# Patient Record
Sex: FEMALE | Race: WHITE | Employment: OTHER | URBAN - METROPOLITAN AREA
[De-identification: names, ages, dates, MRNs, and addresses within clinical notes are randomized per-mention and may not be internally consistent; named-entity substitution may affect disease eponyms.]

---

## 2017-01-23 ENCOUNTER — OFFICE VISIT (OUTPATIENT)
Dept: FAMILY MEDICINE CLINIC | Age: 67
End: 2017-01-23

## 2017-01-23 VITALS
HEART RATE: 95 BPM | HEIGHT: 66 IN | DIASTOLIC BLOOD PRESSURE: 64 MMHG | TEMPERATURE: 98.2 F | WEIGHT: 169.8 LBS | SYSTOLIC BLOOD PRESSURE: 146 MMHG | OXYGEN SATURATION: 95 % | RESPIRATION RATE: 12 BRPM | BODY MASS INDEX: 27.29 KG/M2

## 2017-01-23 DIAGNOSIS — R73.02 IGT (IMPAIRED GLUCOSE TOLERANCE): ICD-10-CM

## 2017-01-23 DIAGNOSIS — E55.9 VITAMIN D DEFICIENCY: ICD-10-CM

## 2017-01-23 DIAGNOSIS — Z72.0 TOBACCO ABUSE: ICD-10-CM

## 2017-01-23 DIAGNOSIS — F41.9 ANXIETY: ICD-10-CM

## 2017-01-23 DIAGNOSIS — I73.9 PVD (PERIPHERAL VASCULAR DISEASE) (HCC): ICD-10-CM

## 2017-01-23 DIAGNOSIS — J44.9 CHRONIC OBSTRUCTIVE PULMONARY DISEASE, UNSPECIFIED COPD TYPE (HCC): Primary | ICD-10-CM

## 2017-01-23 DIAGNOSIS — E78.5 HYPERLIPIDEMIA, UNSPECIFIED HYPERLIPIDEMIA TYPE: ICD-10-CM

## 2017-01-23 RX ORDER — AZITHROMYCIN 250 MG/1
TABLET, FILM COATED ORAL
Qty: 6 TAB | Refills: 0 | Status: SHIPPED | OUTPATIENT
Start: 2017-01-23 | End: 2017-01-28

## 2017-01-23 RX ORDER — ALPRAZOLAM 0.5 MG/1
TABLET ORAL
Qty: 90 TAB | Refills: 5 | Status: SHIPPED | OUTPATIENT
Start: 2017-01-23 | End: 2017-07-04 | Stop reason: SDUPTHER

## 2017-01-23 NOTE — MR AVS SNAPSHOT
Visit Information Date & Time Provider Department Dept. Phone Encounter #  
 1/23/2017 10:30 AM Nena Meier  [facility] [phone] [id number] Follow-up Instructions Return in about 6 months (around 7/23/2017) for cholesterol. Your Appointments 1/23/2017 10:30 AM  
ROUTINE CARE with Nena Meier  [facility] ([address]) Appt Note: routine 6 months f/u; LM to r/s - jdp - 12/29/16; LM to r/s - jdp - 12/29/16  
 [address] [phone]  
[phone]  
  
   
 [address] [address] Upcoming Health Maintenance Date Due Pneumococcal 19-64 Medium Risk (1 of 1 - PPSV23) 2/3/1971 DTaP/Tdap/Td series (1 - Tdap) 2/3/1973 FOBT Q 1 YEAR AGE 50-75 6/30/2015 INFLUENZA AGE 9 TO ADULT 8/1/2016 PAP AKA CERVICAL CYTOLOGY 2/26/2017 BREAST CANCER SCRN MAMMOGRAM 3/31/2018 Allergies as of 1/23/2017  Review Complete On: 1/23/2017 By: Nena Meier NP Severity Noted Reaction Type Reactions Penicillins High 09/29/2010    Rash, Itching, Swelling Of throat Bactrim [Sulfamethoprim Ds]  09/29/2010    Rash Biaxin [Clarithromycin]  09/29/2010    Rash Ciprofloxacin  02/27/2014    Rash Ivp Dye [Fd And C Blue No.1]  09/29/2010    Rash Keflex [Cephalexin]  09/29/2010    Rash  
 Nsaids (Non-steroidal Anti-inflammatory Drug)  09/29/2010    Rash Simvastatin  10/30/2014    Myalgia Tetracycline  09/29/2010    Rash Current Immunizations  Reviewed on 9/29/2014 Name Date Influenza Vaccine 10/15/2015, 9/27/2014, 11/20/2013 Influenza Vaccine Split 11/9/2011, 10/26/2010 Not reviewed this visit You Were Diagnosed With   
  
 Codes Comments Chronic obstructive pulmonary disease, unspecified [facility] ([facility]    -  Primary ICD-10-CM: J44.9 ICD-9-CM: 224  IGT (impaired glucose tolerance)     ICD-10-CM: R73.02 
 ICD-9-CM: 790.22 PVD (peripheral vascular disease) ([facility]     ICD-10-CM: I73.9 ICD-9-CM: 443.9 Anxiety     ICD-10-CM: F41.9 ICD-9-CM: 300.00 Hyperlipidemia, unspecified hyperlipidemia type     ICD-10-CM: E78.5 ICD-9-CM: 272.4 Tobacco abuse     ICD-10-CM: Z72.0 ICD-9-CM: 305.1 Vitamin D deficiency     ICD-10-CM: E55.9 ICD-9-CM: 268.9 Vitals BP Pulse Temp Resp Height(growth percentile) Weight(growth percentile) 146/64 (BP 1 Location: Right arm, BP Patient Position: Sitting) 95 98.2 °F (36.8 °C) (Oral) 12 5' 6\" (1.676 m) 169 lb 12.8 oz (77 kg) SpO2 BMI OB Status Smoking Status 95% 27.41 kg/m2 Postmenopausal Current Every Day Smoker Vitals History BMI and BSA Data Body Mass Index Body Surface Area  
 27.41 kg/m 2 1.89 m 2 Preferred Pharmacy Pharmacy Name Phone [address] [facility] AT [facility] OF  [address] & [address] [facility] [phone] Your Updated Medication List  
  
   
This list is accurate as of: 1/23/17 10:27 AM.  Always use your most recent med list.  
  
  
  
  
 ACCU-CHEK COMPACT PLUS TEST Strp Generic drug:  Blood Sugar Diagnostic, Drum USE TO TEST TWICE DAILY ALPRAZolam 0.5 mg tablet Commonly known as:  XANAX  
TAKE 1 TABLET BY MOUTH THREE TIMES DAILY AS NEEDED FOR ANXIETY  
  
 azithromycin 250 mg tablet Commonly known as:  [place] Jennifer Take 2 tablets today, then take 1 tablet daily Blood-Glucose Meter monitoring kit For twice a day testing  
  
 cyclobenzaprine 10 mg tablet Commonly known as:  FLEXERIL  
  
 fentaNYL 25 mcg/hr PATCH Commonly known as:  DURAGESIC  
1 Patch by TransDERmal route every seventy-two (72) hours. gabapentin 600 mg tablet Commonly known as:  NEURONTIN Take  by mouth three (3) times daily. Lancets Misc Commonly known as:  ACCU-CHEK SOFTCLIX LANCETS Use as dir  
  
 levalbuterol tartrate 45 mcg/actuation inhaler Commonly known as:  Aure Ras Take 1-2 Puffs by inhalation every four (4) hours as needed for Wheezing. PERCOCET  mg per tablet Generic drug:  oxyCODONE-acetaminophen Take 1 Tab by mouth three (3) times daily. pravastatin 20 mg tablet Commonly known as:  PRAVACHOL  
  
 raNITIdine 150 mg tablet Commonly known as:  ZANTAC TAKE 1 TABLET BY MOUTH TWICE A DAY FOR ACID REFLUX  
  
 tiZANidine 2 mg tablet Commonly known as:  [place]ose[place] ZyrTEC 10 mg tablet Generic drug:  cetirizine Take  by mouth daily. Prescriptions Printed Refills ALPRAZolam (XANAX) 0.5 mg tablet 5 Sig: TAKE 1 TABLET BY MOUTH THREE TIMES DAILY AS NEEDED FOR ANXIETY Class: Print Prescriptions Sent to Pharmacy Refills  
 azithromycin (ZITHROMAX) 250 mg tablet 0 Sig: Take 2 tablets today, then take 1 tablet daily Class: Normal  
 Pharmacy: [facility] [address]  Ph #: [phone] We Performed the Following CBC W/O DIFF [29002 CPT(R)] HEMOGLOBIN A1C WITH EAG [52851 CPT(R)] LIPID PANEL [06749 CPT(R)] METABOLIC PANEL, COMPREHENSIVE [14748 CPT(R)] VITAMIN D, 25 HYDROXY [id number] CPT(R)] Follow-up Instructions Return in about 6 months (around 7/23/2017) for cholesterol. Patient Instructions Learning About COPD What is COPD? COPD is a lung disease that makes it hard to breathe. COPD stands for chronic obstructive pulmonary disease. It is caused by damage to the lungs over many years, usually from smoking. COPD is a mix of two diseases: chronic bronchitis and emphysema. Other things that may put you at risk for COPD include breathing chemical fumes, dust, or air pollution over a long period of time. Secondhand smoke is also bad.  
In chronic bronchitis, the airways that carry air to the lungs (bronchial tubes) get inflamed and make a lot of mucus. This can narrow or block the airways, making it hard for you to breathe. In emphysema, the air sacs in your lungs are damaged and lose their stretch. Less air gets in and out of your lungs, which makes you feel short of breath. What can you expect when you have COPD? COPD gets worse over time. You cannot undo the damage to your lungs. Over time, you may find that: 
· You get short of breath even when you do simple things like get dressed or fix a meal. 
· It is hard to eat or exercise. · You lose weight and feel weaker. But there are things you can do to prevent more damage and feel better. What are the symptoms? The main symptoms are: · A cough that will not go away. · Mucus that comes up when you cough. · Shortness of breath that gets worse with activity. At times, your symptoms may suddenly flare up and get much worse. This is a called a COPD exacerbation (say \"egg-[facility]\"). When this happens, your usual symptoms quickly get worse and stay bad. This can be dangerous. You may have to go to the hospital. 
How can you keep COPD from getting worse? Don't smoke. That is the best way to keep COPD from getting worse. If you already smoke, it is never too late to stop. If you need help quitting, talk to your doctor about stop-smoking programs and medicines. These can increase your chances of quitting for good. You can do other things to keep COPD from getting worse: · Avoid bad air. Air pollution, chemical fumes, and dust also can make COPD worse. · Get a flu shot every year. A shot may keep the flu from turning into something more serious, like pneumonia. A flu shot also may lower your chances of having a COPD flare-up. · Get a pneumococcal shot. Most people need only one shot to prevent pneumonia, but doctors sometimes recommend a second shot for some people who got their first shot before they turned 72.  Talk with your doctor about whether you need a second shot. How is COPD treated? COPD is treated with medicines and oxygen. You also can take steps at home to stay healthy and keep your COPD from getting worse. Medicines and oxygen therapy · You may be taking medicines such as: ¨ Bronchodilators. These help open your airways and make breathing easier. Bronchodilators are either short-acting (work for 6 to 9 hours) or long-acting (work for 24 hours). You inhale most bronchodilators, so they start to act quickly. Always carry your quick-relief inhaler with you in case you need it while you are away from home. ¨ Corticosteroids. These reduce airway inflammation. They come in pill or inhaled form. You must take these medicines every day for them to work well. · Take your medicines exactly as prescribed. Call your doctor if you think you are having a problem with your medicine. · Oxygen therapy boosts the amount of oxygen in your blood and helps you breathe easier. Use the flow rate your doctor has recommended, and do not change it without talking to your doctor first. 
Other care at home · If your doctor recommends it, get more exercise. Walking is a good choice. Bit by bit, increase the amount you walk every day. Try for at least 30 minutes on most days of the week. · Learn breathing methodssuch as breathing through pursed lipsto help you become less short of breath. · If your doctor has not set you up with a pulmonary rehabilitation program, talk to him or her about whether rehab is right for you. Rehab includes exercise programs, education about your disease and how to manage it, help with diet and other changes, and emotional support. · Eat regular, healthy meals. Use bronchodilators about 1 hour before you eat to make it easier to eat. Eat several small meals instead of three large ones. Drink beverages at the end of the meal. Avoid foods that are hard to chew. Follow-up care is a key part of your treatment and safety. Be sure to make and go to all appointments, and call your doctor if you are having problems. It's also a good idea to know your test results and keep a list of the medicines you take. Where can you learn more? Go to http://dean-berny.info/. Enter V314 in the search box to learn more about \"Learning About COPD. \" 
Current as of: May 23, 2016 Content Version: 11.1 © 3578-8331 Healthwise, Incorporated. Care instructions adapted under license by [employer] (which disclaims liability or warranty for this information). If you have questions about a medical condition or this instruction, always ask your healthcare professional. [address] any warranty or liability for your use of this information. Introducing [facility]! [employer] introduces THINK360 patient portal. Now you can access parts of your medical record, email your doctor's office, and request medication refills online. 1. In your internet browser, go to [email or web address] 2. Click on the First Time User? Click Here link in the Sign In box. You will see the New Member Sign Up page. 3. Enter your THINK360 Access Code exactly as it appears below. You will not need to use this code after youve completed the sign-up process. If you do not sign up before the expiration date, you must request a new code. · THINK360 Access Code: [email or web address] Expires: 4/23/2017 10:27 AM 
 
4. Enter the last four digits of your Social Security Number (xxxx) and Date of Birth (mm/dd/yyyy) as indicated and click Submit. You will be taken to the next sign-up page. 5. Create a THINK360 ID. This will be your THINK360 login ID and cannot be changed, so think of one that is secure and easy to remember. 6. Create a BigStringt password. You can change your password at any time. 7. Enter your Password Reset Question and Answer. This can be used at a later time if you forget your password. 8. Enter your e-mail address. You will receive e-mail notification when new information is available in [address] 9. Click Sign Up. You can now view and download portions of your medical record. 10. Click the Download Summary menu link to download a portable copy of your medical information. If you have questions, please visit the Frequently Asked Questions section of the Where website. Remember, Where is NOT to be used for urgent needs. For medical emergencies, dial 911. Now available from your iPhone and Android! Please provide this summary of care documentation to your next provider. Your primary care clinician is listed as Ruby Burgess. If you have any questions after today's visit, please call [phone].

## 2017-01-23 NOTE — PATIENT INSTRUCTIONS
Learning About COPD  What is COPD? COPD is a lung disease that makes it hard to breathe. COPD stands for chronic obstructive pulmonary disease. It is caused by damage to the lungs over many years, usually from smoking. COPD is a mix of two diseases: chronic bronchitis and emphysema. Other things that may put you at risk for COPD include breathing chemical fumes, dust, or air pollution over a long period of time. Secondhand smoke is also bad. In chronic bronchitis, the airways that carry air to the lungs (bronchial tubes) get inflamed and make a lot of mucus. This can narrow or block the airways, making it hard for you to breathe. In emphysema, the air sacs in your lungs are damaged and lose their stretch. Less air gets in and out of your lungs, which makes you feel short of breath. What can you expect when you have COPD? COPD gets worse over time. You cannot undo the damage to your lungs. Over time, you may find that:  · You get short of breath even when you do simple things like get dressed or fix a meal.  · It is hard to eat or exercise. · You lose weight and feel weaker. But there are things you can do to prevent more damage and feel better. What are the symptoms? The main symptoms are:  · A cough that will not go away. · Mucus that comes up when you cough. · Shortness of breath that gets worse with activity. At times, your symptoms may suddenly flare up and get much worse. This is a called a COPD exacerbation (say \"egg-TAIWO--BAY-[facility]\"). When this happens, your usual symptoms quickly get worse and stay bad. This can be dangerous. You may have to go to the hospital.  How can you keep COPD from getting worse? Don't smoke. That is the best way to keep COPD from getting worse. If you already smoke, it is never too late to stop. If you need help quitting, talk to your doctor about stop-smoking programs and medicines. These can increase your chances of quitting for good.   You can do other things to keep COPD from getting worse:  · Avoid bad air. Air pollution, chemical fumes, and dust also can make COPD worse. · Get a flu shot every year. A shot may keep the flu from turning into something more serious, like pneumonia. A flu shot also may lower your chances of having a COPD flare-up. · Get a pneumococcal shot. Most people need only one shot to prevent pneumonia, but doctors sometimes recommend a second shot for some people who got their first shot before they turned 72. Talk with your doctor about whether you need a second shot. How is COPD treated? COPD is treated with medicines and oxygen. You also can take steps at home to stay healthy and keep your COPD from getting worse. Medicines and oxygen therapy  · You may be taking medicines such as:  ¨ Bronchodilators. These help open your airways and make breathing easier. Bronchodilators are either short-acting (work for 6 to 9 hours) or long-acting (work for 24 hours). You inhale most bronchodilators, so they start to act quickly. Always carry your quick-relief inhaler with you in case you need it while you are away from home. ¨ Corticosteroids. These reduce airway inflammation. They come in pill or inhaled form. You must take these medicines every day for them to work well. · Take your medicines exactly as prescribed. Call your doctor if you think you are having a problem with your medicine. · Oxygen therapy boosts the amount of oxygen in your blood and helps you breathe easier. Use the flow rate your doctor has recommended, and do not change it without talking to your doctor first.  Other care at home  · If your doctor recommends it, get more exercise. Walking is a good choice. Bit by bit, increase the amount you walk every day. Try for at least 30 minutes on most days of the week. · Learn breathing methods--such as breathing through pursed lips--to help you become less short of breath.   · If your doctor has not set you up with a pulmonary rehabilitation program, talk to him or her about whether rehab is right for you. Rehab includes exercise programs, education about your disease and how to manage it, help with diet and other changes, and emotional support. · Eat regular, healthy meals. Use bronchodilators about 1 hour before you eat to make it easier to eat. Eat several small meals instead of three large ones. Drink beverages at the end of the meal. Avoid foods that are hard to chew. Follow-up care is a key part of your treatment and safety. Be sure to make and go to all appointments, and call your doctor if you are having problems. It's also a good idea to know your test results and keep a list of the medicines you take. Where can you learn more? Go to http://dean-berny.info/. Enter V314 in the search box to learn more about \"Learning About COPD. \"  Current as of: May 23, 2016  Content Version: 11.1  © 8972-1717 [employer]. Care instructions adapted under license by [employer] (which disclaims liability or warranty for this information). If you have questions about a medical condition or this instruction, always ask your healthcare professional. [place] [address] any warranty or liability for your use of this information.

## 2017-01-23 NOTE — PROGRESS NOTES
HISTORY OF PRESENT ILLNESS  Augusto Rdz is a 61 y.o. female. HPI  Cardiovascular Review:  The patient has hypertension, hyperlipidemia, CAD s/p stenting and peripheral vascular disease. Seen by cardiology, Dr Elinor Martin. Diet and Lifestyle: generally follows a low fat low cholesterol diet, generally follows a low sodium diet, smoker 1 ppd  Home BP Monitoring: is not measured at home. Pertinent ROS: taking medications as instructed, no medication side effects noted, no TIA's, no chest pain on exertion, no dyspnea on exertion, no swelling of ankles. Patient refuses to take cholesterol medication as it causes myalgias. COPD Review:  The patient is being seen for follow up of COPD and history of 45 pack years tobacco use. Oxygen: She currently is not on home oxygen therapy. Symptoms: chronic dyspnea: severity = not present: course of sx: stable. Reports productive cough x 3 weeks. Already treated with Z-pack. Patient uses 1 pillows at night. Patient does smoke cigarettes. Peripheral Vascular Disease  Patient in with known h/o PVD. Patient denies symptoms at present. 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 The patient's risks factors for atherosclerosis include smoking, hypertension, peripheral vascular disease, hypercholesterolemia. She is seen by Dr Shiva Esposito annually. Osteoarthritis and Chronic Pain:  Patient has osteoarthritis and spondylosis, primarily affecting the back. Symptoms onset: problem is longstanding. Rheumatological ROS: using narcotic analgesics regularly daily in stable pattern with good pain control and good quality of life. Pain managed by Dr. Jennifer Laguerre - pain management. Patient had lumbar laminectomy in 8/2015 with Dr. Pan Agrawal without improvement. Patient now being evaluated by Dr. Kate Stratton.         Current Outpatient Prescriptions   Medication Sig Dispense Refill    ALPRAZolam (XANAX) 0.5 mg tablet TAKE 1 TABLET BY MOUTH THREE TIMES DAILY AS NEEDED FOR ANXIETY 90 Tab 5    azithromycin (ZITHROMAX) 250 mg tablet Take 2 tablets today, then take 1 tablet daily 6 Tab 0    pravastatin (PRAVACHOL) 20 mg tablet   4    ACCU-CHEK COMPACT PLUS TEST strp USE TO TEST TWICE DAILY 100 Strip 3    tiZANidine (ZANAFLEX) 2 mg tablet   1    levalbuterol tartrate (XOPENEX) 45 mcg/actuation inhaler Take 1-2 Puffs by inhalation every four (4) hours as needed for Wheezing. 1 Inhaler 2    gabapentin (NEURONTIN) 600 mg tablet Take  by mouth three (3) times daily.  ranitidine (ZANTAC) 150 mg tablet TAKE 1 TABLET BY MOUTH TWICE A DAY FOR ACID REFLUX 180 tablet 3    Lancets (ACCU-CHEK SOFTCLIX LANCETS) misc Use as dir 1 Package 11    Blood-Glucose Meter monitoring kit For twice a day testing 1 Kit 0    oxycodone-acetaminophen (PERCOCET)  mg per tablet Take 1 Tab by mouth three (3) times daily.  cetirizine (ZYRTEC) 10 mg tablet Take  by mouth daily.  cyclobenzaprine (FLEXERIL) 10 mg tablet   0    fentaNYL (DURAGESIC) 25 mcg/hr PATCH 1 Patch by TransDERmal route every seventy-two (72) hours.        Past Medical History   Diagnosis Date    Allergic rhinitis     Anxiety     CAD (coronary artery disease) 2011     Dr Yemi Strong Chronic low back pain     COPD [facility] obstructive [facility] disease) [facility] [address]     Ganglion cyst     GERD (gastroesophageal reflux disease)     Goiter, nodular     Hyperlipidemia     Occlusion and stenosis of right vertebral artery     Pelvic fracture ([facility] [address] 2004    Squamous cell carcinoma of scalp     Subclavian artery stenosis, left (HCC)     Tobacco abuse       Lab Results   Component Value Date/Time    Hemoglobin A1c 5.7 01/17/2017 08:10 AM    Hemoglobin A1c 6.0 07/12/2016 07:43 AM    Hemoglobin A1c 5.7 07/14/2015 07:43 AM    Glucose 95 01/17/2017 08:10 AM    Glucose 84 07/14/2015 07:43 AM    Glucose POC 99 09/29/2010 08:55 AM    LDL, calculated 112 01/17/2017 08:10 AM    Creatinine 0.67 01/17/2017 08:10 AM      Lab Results   Component Value Date/Time    Cholesterol, total 200 01/17/2017 08:10 AM    Cholesterol, Total 218 07/17/2013 07:55 AM    HDL Cholesterol 75 01/17/2017 08:10 AM    LDL, calculated 112 01/17/2017 08:10 AM    Triglyceride 64 01/17/2017 08:10 AM     Lab Results   Component Value Date/Time    TSH 1.110 07/12/2016 07:43 AM    T4, Free 1.10 07/12/2016 07:43 AM       Review of Systems   Constitutional: Negative for malaise/fatigue and weight loss. HENT: Negative for congestion. Eyes: Negative for blurred vision. Cardiovascular: Negative for palpitations, claudication and leg swelling. Musculoskeletal: Positive for back pain and joint pain. Negative for myalgias. Neurological: Negative for dizziness and weakness. Endo/Heme/Allergies: Does not bruise/bleed easily. Psychiatric/Behavioral: Negative for depression. The patient is not nervous/anxious. Physical Exam   Constitutional: She is oriented to person, place, and time. She appears well-developed and well-nourished. Neck: Normal range of motion. Neck supple. No JVD present. Carotid bruit is present (bilaterally L>R). No thyromegaly present. Cardiovascular: Normal rate and regular rhythm. Exam reveals decreased pulses. Exam reveals no gallop and no friction rub. Murmur heard. Pulses:       Carotid pulses are on the right side with bruit, and on the left side with bruit. Pulmonary/Chest: Effort normal. No respiratory distress. She has no decreased breath sounds. She has no wheezes. She has no rhonchi. Musculoskeletal: She exhibits no edema. Lymphadenopathy:     She has no cervical adenopathy. Neurological: She is alert and oriented to person, place, and time. Psychiatric: She has a normal mood and affect. Her behavior is normal.   Nursing note and vitals reviewed. ASSESSMENT and Amena Whalen was seen today for cough.     Diagnoses and all orders for this visit:    Chronic obstructive pulmonary disease, unspecified COPD type ([facility]  Counseled patient on need to quit smoking.  -     azithromycin (ZITHROMAX) 250 mg tablet; Take 2 tablets today, then take 1 tablet daily  -     CBC W/O DIFF    IGT (impaired glucose tolerance)  Stable, no changes to current therapy  -     HEMOGLOBIN A1C WITH EAG    PVD (peripheral vascular disease) ([facility]  Managed by vascular surgery, no changes    Anxiety  Stable, no changes to current therapy  -     ALPRAZolam (XANAX) 0.5 mg tablet; TAKE 1 TABLET BY MOUTH THREE TIMES DAILY AS NEEDED FOR ANXIETY    Hyperlipidemia, unspecified hyperlipidemia type  -     METABOLIC PANEL, COMPREHENSIVE  -     LIPID PANEL    Vitamin D deficiency  -     VITAMIN D, 25 HYDROXY      I have discussed the diagnosis with the patient and the intended plan as seen in the above orders. The patient has received an after-visit summary along with patient information handout. I have discussed medication side effects and warnings with the patient as well. Follow-up Disposition:  Return in about 6 months (around 7/23/2017) for cholesterol.

## 2017-01-23 NOTE — PROGRESS NOTES
1. Have you been to the ER, urgent care clinic since your last visit? Hospitalized since your last visit? No    2. Have you seen or consulted any other health care providers outside of the [facility] since your last visit? Include any pap smears or colon screening.  No     Chief Complaint   Patient presents with    Cough     still producing a yellow mucus- has already completed one z-pack and is currently using mucinex

## 2017-07-14 ENCOUNTER — HOSPITAL ENCOUNTER (OUTPATIENT)
Dept: LAB | Age: 67
Discharge: HOME | End: 2017-07-14
Payer: MEDICARE

## 2017-07-14 PROCEDURE — 36415 COLL VENOUS BLD VENIPUNCTURE: CPT

## 2017-07-14 PROCEDURE — 80061 LIPID PANEL: CPT

## 2017-07-14 PROCEDURE — 83036 HEMOGLOBIN GLYCOSYLATED A1C: CPT

## 2017-07-14 PROCEDURE — 82306 VITAMIN D 25 HYDROXY: CPT

## 2017-07-14 PROCEDURE — 85027 COMPLETE CBC AUTOMATED: CPT

## 2017-07-14 PROCEDURE — 80053 COMPREHEN METABOLIC PANEL: CPT

## 2017-07-16 LAB
25(OH)D3+25(OH)D2 SERPL-MCNC: 11.6 NG/ML (ref 30–100)
ALBUMIN SERPL-MCNC: 4.1 G/DL (ref 3.6–4.8)
ALBUMIN/GLOB SERPL: 1.5 {RATIO} (ref 1.2–2.2)
ALP SERPL-CCNC: 79 IU/L (ref 39–117)
ALT SERPL-CCNC: 20 IU/L (ref 0–32)
AST SERPL-CCNC: 19 IU/L (ref 0–40)
BILIRUB SERPL-MCNC: 0.5 MG/DL (ref 0–1.2)
BUN SERPL-MCNC: 10 MG/DL (ref 8–27)
BUN/CREAT SERPL: 14 (ref 12–28)
CALCIUM SERPL-MCNC: 9.4 MG/DL (ref 8.7–10.3)
CHLORIDE SERPL-SCNC: 98 MMOL/L (ref 96–106)
CHOLEST SERPL-MCNC: 214 MG/DL (ref 100–199)
CO2 SERPL-SCNC: 25 MMOL/L (ref 18–29)
CREAT SERPL-MCNC: 0.69 MG/DL (ref 0.57–1)
ERYTHROCYTE [DISTWIDTH] IN BLOOD BY AUTOMATED COUNT: 13.8 % (ref 12.3–15.4)
EST. AVERAGE GLUCOSE BLD GHB EST-MCNC: 114 MG/DL
GLOBULIN SER CALC-MCNC: 2.8 G/DL (ref 1.5–4.5)
GLUCOSE SERPL-MCNC: 84 MG/DL (ref 65–99)
HBA1C MFR BLD: 5.6 % (ref 4.8–5.6)
HCT VFR BLD AUTO: 45.7 % (ref 34–46.6)
HDLC SERPL-MCNC: 72 MG/DL
HGB BLD-MCNC: 15.1 G/DL (ref 11.1–15.9)
INTERPRETATION, 910389: NORMAL
LDLC SERPL CALC-MCNC: 125 MG/DL (ref 0–99)
MCH RBC QN AUTO: 29.2 PG (ref 26.6–33)
MCHC RBC AUTO-ENTMCNC: 33 G/DL (ref 31.5–35.7)
MCV RBC AUTO: 88 FL (ref 79–97)
PLATELET # BLD AUTO: 188 X10E3/UL (ref 150–379)
POTASSIUM SERPL-SCNC: 4.6 MMOL/L (ref 3.5–5.2)
PROT SERPL-MCNC: 6.9 G/DL (ref 6–8.5)
RBC # BLD AUTO: 5.18 X10E6/UL (ref 3.77–5.28)
SODIUM SERPL-SCNC: 138 MMOL/L (ref 134–144)
TRIGL SERPL-MCNC: 85 MG/DL (ref 0–149)
VLDLC SERPL CALC-MCNC: 17 MG/DL (ref 5–40)
WBC # BLD AUTO: 5.2 X10E3/UL (ref 3.4–10.8)

## 2017-07-19 ENCOUNTER — OFFICE VISIT (OUTPATIENT)
Dept: FAMILY MEDICINE CLINIC | Age: 67
End: 2017-07-19

## 2017-07-19 VITALS
BODY MASS INDEX: 28.19 KG/M2 | DIASTOLIC BLOOD PRESSURE: 57 MMHG | HEART RATE: 76 BPM | RESPIRATION RATE: 16 BRPM | OXYGEN SATURATION: 96 % | HEIGHT: 66 IN | SYSTOLIC BLOOD PRESSURE: 125 MMHG | WEIGHT: 175.4 LBS | TEMPERATURE: 98.6 F

## 2017-07-19 DIAGNOSIS — Z11.59 NEED FOR HEPATITIS C SCREENING TEST: ICD-10-CM

## 2017-07-19 DIAGNOSIS — E78.5 HYPERLIPIDEMIA, UNSPECIFIED HYPERLIPIDEMIA TYPE: Primary | ICD-10-CM

## 2017-07-19 DIAGNOSIS — M51.36 DDD (DEGENERATIVE DISC DISEASE), LUMBAR: ICD-10-CM

## 2017-07-19 DIAGNOSIS — R73.02 IGT (IMPAIRED GLUCOSE TOLERANCE): ICD-10-CM

## 2017-07-19 DIAGNOSIS — Z12.11 SCREENING FOR COLON CANCER: ICD-10-CM

## 2017-07-19 DIAGNOSIS — I25.83 CORONARY ARTERY DISEASE DUE TO LIPID RICH PLAQUE: ICD-10-CM

## 2017-07-19 DIAGNOSIS — Z72.0 TOBACCO ABUSE: ICD-10-CM

## 2017-07-19 DIAGNOSIS — Z78.0 POSTMENOPAUSAL: ICD-10-CM

## 2017-07-19 DIAGNOSIS — I25.10 CORONARY ARTERY DISEASE DUE TO LIPID RICH PLAQUE: ICD-10-CM

## 2017-07-19 DIAGNOSIS — Z23 ENCOUNTER FOR IMMUNIZATION: ICD-10-CM

## 2017-07-19 DIAGNOSIS — E55.9 VITAMIN D DEFICIENCY: ICD-10-CM

## 2017-07-19 DIAGNOSIS — Z00.00 MEDICARE ANNUAL WELLNESS VISIT, SUBSEQUENT: ICD-10-CM

## 2017-07-19 DIAGNOSIS — J44.9 CHRONIC OBSTRUCTIVE PULMONARY DISEASE, UNSPECIFIED COPD TYPE (HCC): ICD-10-CM

## 2017-07-19 DIAGNOSIS — I73.9 PVD (PERIPHERAL VASCULAR DISEASE) (HCC): ICD-10-CM

## 2017-07-19 DIAGNOSIS — F41.9 ANXIETY: ICD-10-CM

## 2017-07-19 DIAGNOSIS — Z71.89 ACP (ADVANCE CARE PLANNING): ICD-10-CM

## 2017-07-19 RX ORDER — BACLOFEN 10 MG/1
TABLET ORAL
Refills: 2 | COMMUNITY
Start: 2017-07-12 | End: 2017-10-18

## 2017-07-19 RX ORDER — ERGOCALCIFEROL 1.25 MG/1
50000 CAPSULE ORAL
Qty: 12 CAP | Refills: 1 | Status: SHIPPED | OUTPATIENT
Start: 2017-07-19 | End: 2017-10-18

## 2017-07-19 NOTE — PROGRESS NOTES
Kristen Gabriel is a 74 y.o. female and presents for annual Medicare Wellness Visit. Problem List: Reviewed with patient and discussed risk factors. Patient Active Problem List   Diagnosis Code    Tobacco abuse Z72.0    COPD [facility]ch[facility] obstructive pulmonary disease) ([facility] J44.9    Chronic back pain M54.9, G89.29    Hyperlipidemia E78.5    PVD (peripheral vascular disease) ([facility] [address] I73.9    CAD (coronary artery disease) I25.10    Anxiety F41.9       Current medical providers:  Patient Care Team:  Sin Ayoub NP as PCP - General (Nurse Practitioner)  Zaira Mclean MD (General Surgery)  Gordon Tello MD (Orthopedic Surgery)  Alysha Lockhart MD (Endocrinology)  Ara Bernal MD (Cardiology)    PSH: Reviewed with patient  Past Surgical History:   Procedure Laterality Date    HX CARPAL TUNNEL RELEASE      HX CYST REMOVAL      HX HEART CATHETERIZATION  2011    SHOULDER SURG [facility]      left shoulder        SH: Reviewed with patient  Social History   Substance Use Topics    Smoking status: Current Every Day Smoker     Packs/day: 0.50     Years: 30.00     Types: Cigarettes    Smokeless tobacco: Never Used    Alcohol use No       FH: Reviewed with patient  Family History   Problem Relation Age of Onset    Heart Disease Mother     Heart Disease Father     Lung Disease Father        Medications/Allergies: Reviewed with patient  Current Outpatient Prescriptions on File Prior to Visit   Medication Sig Dispense Refill    ACCU-CHEK COMPACT PLUS TEST strp USE TO TEST FOR BLOOD SUGAR TWICE DAILY 102 Strip 5    ALPRAZolam (XANAX) 0.5 mg tablet TAKE 1 TABLET BY MOUTH THREE TIMES DAILY AS NEEDED FOR ANXIETY 90 Tab 5    pravastatin (PRAVACHOL) 20 mg tablet   4    tiZANidine (ZANAFLEX) 2 mg tablet   1    levalbuterol tartrate (XOPENEX) 45 mcg/actuation inhaler Take 1-2 Puffs by inhalation every four (4) hours as needed for Wheezing.  1 Inhaler 2    gabapentin (NEURONTIN) 600 mg tablet Take  by mouth three (3) times daily.  ranitidine (ZANTAC) 150 mg tablet TAKE 1 TABLET BY MOUTH TWICE A DAY FOR ACID REFLUX 180 tablet 3    Lancets (ACCU-CHEK SOFTCLIX LANCETS) misc Use as dir 1 Package 11    Blood-Glucose Meter monitoring kit For twice a day testing 1 Kit 0    oxycodone-acetaminophen (PERCOCET)  mg per tablet Take 1 Tab by mouth three (3) times daily.  cetirizine (ZYRTEC) 10 mg tablet Take  by mouth daily.  cyclobenzaprine (FLEXERIL) 10 mg tablet   0    fentaNYL (DURAGESIC) 25 mcg/hr PATCH 1 Patch by TransDERmal route every seventy-two (72) hours. No current facility-administered medications on file prior to visit. Allergies   Allergen Reactions    Penicillins Rash, Itching and Swelling     Of throat    Bactrim [Sulfamethoprim Ds] Rash    Biaxin [Clarithromycin] Rash    Ciprofloxacin Rash    Ivp Dye [Fd And C Blue No.1] Rash    Keflex [Cephalexin] Rash    Nsaids (Non-Steroidal Anti-Inflammatory Drug) Rash    Simvastatin Myalgia    Tetracycline Rash       Objective:  Visit Vitals    /57 (BP 1 Location: Right arm, BP Patient Position: Sitting)    Pulse 76    Temp 98.6 °F (37 °C) (Oral)    Resp 16    Ht 5' 6\" (1.676 m)    Wt 175 lb 6.4 oz (79.6 kg)    SpO2 96%    BMI 28.31 kg/m2    Body mass index is 28.31 kg/(m^2). Assessment of cognitive impairment: Alert and oriented x 3    Depression Screen:   PHQ over the last two weeks 1/23/2017   Little interest or pleasure in doing things Not at all   Feeling down, depressed or hopeless Not at all   Total Score PHQ 2 0       Fall Risk Assessment:  No flowsheet data found. Functional Ability:   Does the patient exhibit a steady gait? yes   How long did it take the patient to get up and walk from a sitting position? 1 sec   Is the patient self reliant?  (ie can do own laundry, meals, household chores)  yes     Does the patient handle his/her own medications?   yes     Does the patient handle his/her own money? yes     Is the patients home safe (ie good lighting, handrails on stairs and bath, etc.)? yes     Did you notice or did patient express any hearing difficulties? no     Did you notice or did patient express any vision difficulties?   no     Were distance and reading eye charts used? no       Advance Care Planning:   Patient was offered the opportunity to discuss advance care planning:  yes     Does patient have an Advance Directive:  no   If no, did you provide information on Caring Connections? yes       Plan:      Orders Placed This Encounter    DEXA BONE DENSITY STUDY AXIAL    OCCULT BLOOD, IMMUNOASSAY (FIT)    baclofen (LIORESAL) 10 mg tablet    ergocalciferol (ERGOCALCIFEROL) 50,000 unit capsule    pneumococcal 13 sugar conj dip (PREVNAR-13) 0.5 mL syrg injection       Health Maintenance   Topic Date Due    DTaP/Tdap/Td series (1 - Tdap) 02/03/1973    FOBT Q 1 YEAR AGE 50-75  06/30/2015    GLAUCOMA SCREENING Q2Y  02/03/2017    OSTEOPOROSIS SCREENING (DEXA)  02/03/2017    Pneumococcal 65+ Low/Medium Risk (1 of 2 - PCV13) 02/03/2017    MEDICARE YEARLY EXAM  02/03/2017    INFLUENZA AGE 9 TO ADULT  08/01/2017    BREAST CANCER SCRN MAMMOGRAM  03/31/2018    Hepatitis C Screening  Addressed    ZOSTER VACCINE AGE 60>  Addressed       *Patient verbalized understanding and agreement with the plan. A copy of the After Visit Summary with personalized health plan was given to the patient today.

## 2017-07-19 NOTE — PATIENT INSTRUCTIONS
Advance Care Planning: Care Instructions  Your Care Instructions  It can be hard to live with an illness that cannot be cured. But if your health is getting worse, you may want to make decisions about end-of-life care. Planning for the end of your life does not mean that you are giving up. It is a way to make sure that your wishes are met. Clearly stating your wishes can make it easier for your loved ones. Making plans while you are still able may also ease your mind and make your final days less stressful and more meaningful. Follow-up care is a key part of your treatment and safety. Be sure to make and go to all appointments, and call your doctor if you are having problems. It's also a good idea to know your test results and keep a list of the medicines you take. What can you do to plan for the end of life? · You can bring these issues up with your doctor. You do not need to wait until your doctor starts the conversation. You might start with \"I would not be willing to live with . Benna Him Benna Him Benna Him \" When you complete this sentence it helps your doctor understand your wishes. · Talk openly and honestly with your doctor. This is the best way to understand the decisions you will need to make as your health changes. Know that you can always change your mind. · Ask your doctor about commonly used life-support measures. These include tube feedings, breathing machines, and fluids given through a vein (IV). Understanding these treatments will help you decide whether you want them. · You may choose to have these life-supporting treatments for a limited time. This allows a trial period to see whether they will help you. You may also decide that you want your doctor to take only certain measures to keep you alive. It is important to spell out these conditions so that your doctor and family understand them. · Talk to your doctor about how long you are likely to live.  He or she may be able to give you an idea of what usually happens with your specific illness. · Think about preparing papers that state your wishes. This way there will not be any confusion about what you want. You can change your instructions at any time. Which papers should you prepare? Advance directives are legal papers that tell doctors how you want to be cared for at the end of your life. You do not need a  to write these papers. Ask your doctor or your state health department for information on how to write your advance directives. They may have the forms for each of these types of papers. Make sure your doctor has a copy of these on file, and give a copy to a family member or close friend. · Consider a do-not-resuscitate order (DNR). This order asks that no extra treatments be done if your heart stops or you stop breathing. Extra treatments may include cardiopulmonary resuscitation (CPR), electrical shock to restart your heart, or a machine to breathe for you. If you decide to have a DNR order, ask your doctor to explain and write it. Place the order in your home where everyone can easily see it. · Consider a living will. A living will explains your wishes about life support and other treatments at the end of your life if you become unable to speak for yourself. Living sorto tell doctors to use or not use treatments that would keep you alive. You must have one or two witnesses or a notary present when you sign this form. · Consider a durable power of  for health care. This allows you to name a person to make decisions about your care if you are not able to. Most people ask a close friend or family member. Talk to this person about the kinds of treatments you want and those that you do not want. Make sure this person understands your wishes. These legal papers are simple to change. Tell your doctor what you want to change, and ask him or her to make a note in your medical file. Give your family updated copies of the papers.   Where can you learn more?  Go to http://dean-berny.info/. Enter P184 in the search box to learn more about \"Advance Care Planning: Care Instructions. \"  Current as of: November 17, 2016  Content Version: 11.3  © 7730-3767 [employer]. Care instructions adapted under license by [employer] (which disclaims liability or warranty for this information). If you have questions about a medical condition or this instruction, always ask your healthcare professional. [address] any warranty or liability for your use of this information.

## 2017-07-19 NOTE — MR AVS SNAPSHOT
Visit Information Date & Time Provider Department Dept. Phone Encounter #  
 7/19/2017 10:15 AM Erna Leach NP [address] [place] [address] [phone] [id number] Follow-up Instructions Return in about 6 months (around 1/19/2018) for disease management. Upcoming Health Maintenance Date Due DTaP/Tdap/Td series (1 - Tdap) 2/3/1973 FOBT Q 1 YEAR AGE 50-75 6/30/2015 GLAUCOMA SCREENING Q2Y 2/3/2017 OSTEOPOROSIS SCREENING (DEXA) 2/3/2017 Pneumococcal 65+ Low/Medium Risk (1 of 2 - PCV13) 2/3/2017 MEDICARE YEARLY EXAM 2/3/2017 INFLUENZA AGE 9 TO ADULT 8/1/2017 BREAST CANCER SCRN MAMMOGRAM 3/31/2018 Allergies as of 7/19/2017  Review Complete On: 7/19/2017 By: Erna Leach NP Severity Noted Reaction Type Reactions Penicillins High 09/29/2010    Rash, Itching, Swelling Of throat Bactrim [Sulfamethoprim Ds]  09/29/2010    Rash Biaxin [Clarithromycin]  09/29/2010    Rash Ciprofloxacin  02/27/2014    Rash Ivp Dye [Fd And C Blue No.1]  09/29/2010    Rash Keflex [Cephalexin]  09/29/2010    Rash  
 Nsaids (Non-steroidal Anti-inflammatory Drug)  09/29/2010    Rash Simvastatin  10/30/2014    Myalgia Tetracycline  09/29/2010    Rash Current Immunizations  Reviewed on 9/29/2014 Name Date Influenza Vaccine 10/15/2015, 9/27/2014, 11/20/2013 Influenza Vaccine Split 11/9/2011, 10/26/2010 Not reviewed this visit You Were Diagnosed With   
  
 Codes Comments Hyperlipidemia, unspecified hyperlipidemia type    -  Primary ICD-10-CM: E78.5 ICD-9-CM: 272.4 DDD (degenerative disc disease), lumbar     ICD-10-CM: M51.36 
ICD-9-CM: 722.52   
 PVD (peripheral vascular disease) ([facility]     ICD-10-CM: I73.9 ICD-9-CM: 443. 9 Chronic obstructive pulmonary disease, unspecified COPD type ([facility]     ICD-10-CM: J44.9 ICD-9-CM: 761 Anxiety     ICD-10-CM: F41.9 ICD-9-CM: 300.00 Coronary artery disease due to lipid rich plaque     ICD-10-CM: I25.10, I25.83 ICD-9-CM: 414.00, 414.3 Tobacco abuse     ICD-10-CM: Z72.0 ICD-9-CM: 305.1 Vitamin D deficiency     ICD-10-CM: E55.9 ICD-9-CM: 268.9 Encounter for immunization     ICD-10-CM: M86 ICD-9-CM: V03.89 Screening for colon cancer     ICD-10-CM: Z12.11 ICD-9-CM: V76.51 Postmenopausal     ICD-10-CM: Z78.0 ICD-9-CM: V49.81 Vitals BP Pulse Temp Resp Height(growth percentile) Weight(growth percentile) 125/57 (BP 1 Location: Right arm, BP Patient Position: Sitting) 76 98.6 °F (37 °C) (Oral) 16 5' 6\" (1.676 m) 175 lb 6.4 oz (79.6 kg) SpO2 BMI OB Status Smoking Status 96% 28.31 kg/m2 Postmenopausal Current Every Day Smoker BMI and BSA Data Body Mass Index Body Surface Area  
 28.31 kg/m 2 1.93 m 2 Preferred Pharmacy Pharmacy Name Phone [address], [address] [facility] OF  [address] & [address] [facility].  Sensor [phone] Your Updated Medication List  
  
   
This list is accurate as of: 7/19/17 11:00 AM.  Always use your most recent med list.  
  
  
  
  
 ACCU-CHEK COMPACT PLUS TEST Strp Generic drug:  Blood Sugar Diagnostic, Drum USE TO TEST FOR BLOOD SUGAR TWICE DAILY ALPRAZolam 0.5 mg tablet Commonly known as:  XANAX  
TAKE 1 TABLET BY MOUTH THREE TIMES DAILY AS NEEDED FOR ANXIETY  
  
 baclofen 10 mg tablet Commonly known as:  LIORESAL TK 1 T PO TID PRF SPASMS Blood-Glucose Meter monitoring kit For twice a day testing  
  
 cyclobenzaprine 10 mg tablet Commonly known as:  FLEXERIL  
  
 ergocalciferol 50,000 unit capsule Commonly known as:  ERGOCALCIFEROL Take 1 Cap by mouth every seven (7) days. fentaNYL 25 mcg/hr PATCH Commonly known as:  DURAGESIC  
1 Patch by TransDERmal route every seventy-two (72) hours. gabapentin 600 mg tablet Commonly known as:  NEURONTIN  
 Take  by mouth three (3) times daily. Lancets Misc Commonly known as:  ACCU-CHEK SOFTCLIX LANCETS Use as dir  
  
 levalbuterol tartrate 45 mcg/actuation inhaler Commonly known as:  Bhavik Braxton Take 1-2 Puffs by inhalation every four (4) hours as needed for Wheezing. PERCOCET  mg per tablet Generic drug:  oxyCODONE-acetaminophen Take 1 Tab by mouth three (3) times daily. pneumococcal 13 sugar conj dip 0.5 mL Syrg injection Commonly known as:  PREVNAR-13  
0.5 mL by IntraMUSCular route once for 1 dose. pravastatin 20 mg tablet Commonly known as:  PRAVACHOL  
  
 raNITIdine 150 mg tablet Commonly known as:  ZANTAC TAKE 1 TABLET BY MOUTH TWICE A DAY FOR ACID REFLUX  
  
 tiZANidine 2 mg tablet Commonly known as:  Patsey Beets ZyrTEC 10 mg tablet Generic drug:  cetirizine Take  by mouth daily. Prescriptions Printed Refills  
 pneumococcal 13 sugar conj dip (PREVNAR-13) 0.5 mL syrg injection 0 Si.5 mL by IntraMUSCular route once for 1 dose. Class: Print Route: IntraMUSCular Prescriptions Sent to Pharmacy Refills  
 ergocalciferol (ERGOCALCIFEROL) 50,000 unit capsule 1 Sig: Take 1 Cap by mouth every seven (7) days. Class: Normal  
 Pharmacy: [facility] [place] [address], [address] [place] [address] [address] Ph #: [phone] Route: Oral  
  
We Performed the Following OCCULT BLOOD, IMMUNOASSAY (FIT) [id number] CPT(R)] Follow-up Instructions Return in about 6 months (around 2018) for disease management. To-Do List   
 2017 Imaging:  DEXA BONE DENSITY STUDY AXIAL Referral Information Referral ID Referred By Referred To  
  
 [id number] Naty Cruz Not Available Visits Status Start Date End Date 1 New Request 17  If your referral has a status of pending review or denied, additional information will be sent to support the outcome of this decision. Patient Instructions Advance Care Planning: Care Instructions Your Care Instructions It can be hard to live with an illness that cannot be cured. But if your health is getting worse, you may want to make decisions about end-of-life care. Planning for the end of your life does not mean that you are giving up. It is a way to make sure that your wishes are met. Clearly stating your wishes can make it easier for your loved ones. Making plans while you are still able may also ease your mind and make your final days less stressful and more meaningful. Follow-up care is a key part of your treatment and safety. Be sure to make and go to all appointments, and call your doctor if you are having problems. It's also a good idea to know your test results and keep a list of the medicines you take. What can you do to plan for the end of life? · You can bring these issues up with your doctor. You do not need to wait until your doctor starts the conversation. You might start with \"I would not be willing to live with . Malinda Loose Malinda Loose Malinda Loose \" When you complete this sentence it helps your doctor understand your wishes. · Talk openly and honestly with your doctor. This is the best way to understand the decisions you will need to make as your health changes. Know that you can always change your mind. · Ask your doctor about commonly used life-support measures. These include tube feedings, breathing machines, and fluids given through a vein (IV). Understanding these treatments will help you decide whether you want them. · You may choose to have these life-supporting treatments for a limited time. This allows a trial period to see whether they will help you. You may also decide that you want your doctor to take only certain measures to keep you alive. It is important to spell out these conditions so that your doctor and family understand them. · Talk to your doctor about how long you are likely to live. He or she may be able to give you an idea of what usually happens with your specific illness. · Think about preparing papers that state your wishes. This way there will not be any confusion about what you want. You can change your instructions at any time. Which papers should you prepare? Advance directives are legal papers that tell doctors how you want to be cared for at the end of your life. You do not need a  to write these papers. Ask your doctor or your [facility] department for information on how to write your advance directives. They may have the forms for each of these types of papers. Make sure your doctor has a copy of these on file, and give a copy to a family member or close friend. · Consider a do-not-resuscitate order (DNR). This order asks that no extra treatments be done if your heart stops or you stop breathing. Extra treatments may include cardiopulmonary resuscitation (CPR), electrical shock to restart your heart, or a machine to breathe for you. If you decide to have a DNR order, ask your doctor to explain and write it. Place the order in your home where everyone can easily see it. · Consider a living will. A living will explains your wishes about life support and other treatments at the end of your life if you become unable to speak for yourself. Living sorto tell doctors to use or not use treatments that would keep you alive. You must have one or two witnesses or a notary present when you sign this form. · Consider a durable power of  for health care. This allows you to name a person to make decisions about your care if you are not able to. Most people ask a close friend or family member. Talk to this person about the kinds of treatments you want and those that you do not want. Make sure this person understands your wishes. These legal papers are simple to change.  Tell your doctor what you want to change, and ask him or her to make a note in your medical file. Give your family updated copies of the papers. Where can you learn more? Go to http://dean-berny.info/. Enter P184 in the search box to learn more about \"Advance Care Planning: Care Instructions. \" Current as of: November 17, 2016 Content Version: 11.3 © 7862-2615 [employer]. Care instructions adapted under license by [employer] (which disclaims liability or warranty for this information). If you have questions about a medical condition or this instruction, always ask your healthcare professional. [address] any warranty or liability for your use of this information. Introducing [facility]! Sammi Miller introduces [employer] patient portal. Now you can access parts of your medical record, email your doctor's office, and request medication refills online. 1. In your internet browser, go to [email or web address] The Box/[employer] 2. Click on the First Time User? Click Here link in the Sign In box. You will see the New Member Sign Up page. 3. Enter your [employer] Access Code exactly as it appears below. You will not need to use this code after youve completed the sign-up process. If you do not sign up before the expiration date, you must request a new code. · [employer] Access Code: [email or web address] Expires: 10/17/2017 11:00 AM 
 
4. Enter the last four digits of your Social Security Number (xxxx) and Date of Birth (mm/dd/yyyy) as indicated and click Submit. You will be taken to the next sign-up page. 5. Create a [employer] ID. This will be your [employer] login ID and cannot be changed, so think of one that is secure and easy to remember. 6. Create a [employer] password. You can change your password at any time. 7. Enter your Password Reset Question and Answer. This can be used at a later time if you forget your password. 8. Enter your e-mail address. You will receive e-mail notification when new information is available in [address] 9. Click Sign Up. You can now view and download portions of your medical record. 10. Click the Download Summary menu link to download a portable copy of your medical information. If you have questions, please visit the Frequently Asked Questions section of the [employer] website. Remember, [employer] is NOT to be used for urgent needs. For medical emergencies, dial 911. Now available from your iPhone and Android! Please provide this summary of care documentation to your next provider. Your primary care clinician is listed as Norbert Reza. If you have any questions after today's visit, please call [phone].

## 2017-07-19 NOTE — PROGRESS NOTES
William Menjivar is a 74 y.o. female who was seen in clinic today (7/19/2017). Subjective:  HPI  Cardiovascular Review:  The patient has hypertension, hyperlipidemia, CAD s/p stenting and peripheral vascular disease. Seen by cardiology, Dr Alla Hoff. Patient underwent nuclear stress test with normal findings. Diet and Lifestyle: generally follows a low fat low cholesterol diet, generally follows a low sodium diet, smoker 1 ppd  Home BP Monitoring: is not measured at home. Pertinent ROS: taking medications as instructed, no medication side effects noted, no TIA's, no chest pain on exertion, no dyspnea on exertion, no swelling of ankles. Patient refuses to take cholesterol medication as it causes myalgias. COPD Review:  The patient is being seen for follow up of COPD and history of 45 pack years tobacco use. Oxygen: She currently is not on home oxygen therapy. Symptoms: chronic dyspnea: severity = not present: course of sx: stable. Patient uses 1 pillows at night. Patient does smoke cigarettes. Peripheral Vascular Disease  Patient in with known h/o PVD. Patient denies symptoms at present. 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 The patient's risks factors for atherosclerosis include smoking, hypertension, peripheral vascular disease, hypercholesterolemia. She is seen by Dr Adonis Monroy annually. Osteoarthritis and Chronic Pain:  Patient has osteoarthritis and spondylosis, primarily affecting the back. Symptoms onset: problem is longstanding. Rheumatological ROS: using narcotic analgesics regularly daily in stable pattern with good pain control and good quality of life. Pain managed by Dr. Tab Perez - pain management. Patient had lumbar laminectomy in 8/2015 with Dr. JEANNA FRANCIS without improvement. Patient now being evaluated by Dr. Jimbo Blanton. Anxiety Review:  Patient is seen for anxiety disorder, panic attacks and claustrophobia.  Current treatment includes Xanax TID. Patient on Xanax for 17 years. Patient previously treated Paxil, Zoloft, Prozac, Wellbutrin and had adverse reaction to medications. Ongoing symptoms include: racing thoughts, psychomotor agitation. Patient denies: sweating, chest pain, shortness of breath, difficulty concentrating, suicidal ideation. Reported side effects from the treatment: none. Prior to Admission medications    Medication Sig Start Date End Date Taking? Authorizing Provider   baclofen (LIORESAL) 10 mg tablet TK 1 T PO TID PRF SPASMS 7/12/17  Yes Historical Provider   ergocalciferol (ERGOCALCIFEROL) 50,000 unit capsule Take 1 Cap by mouth every seven (7) days. 7/19/17  Yes Irene Aparicio NP   pneumococcal 13 sugar conj dip (PREVNAR-13) 0.5 mL syrg injection 0.5 mL by IntraMUSCular route once for 1 dose. 7/19/17 7/19/17 Yes Irene Aparicio NP   ACCU-CHEK COMPACT PLUS TEST strp USE TO TEST FOR BLOOD SUGAR TWICE DAILY 7/10/17  Yes Irene Aparicio NP   ALPRAZolam Cezar Martinez) 0.5 mg tablet TAKE 1 TABLET BY MOUTH THREE TIMES DAILY AS NEEDED FOR ANXIETY 7/5/17  Yes Irene Aparicio NP   pravastatin (PRAVACHOL) 20 mg tablet  5/24/16  Yes Historical Provider   tiZANidine (ZANAFLEX) 2 mg tablet  2/17/16  Yes Historical Provider   levalbuterol tartrate (XOPENEX) 45 mcg/actuation inhaler Take 1-2 Puffs by inhalation every four (4) hours as needed for Wheezing. 10/21/15  Yes Irene Aparicio NP   gabapentin (NEURONTIN) 600 mg tablet Take  by mouth three (3) times daily. Yes Historical Provider   ranitidine (ZANTAC) 150 mg tablet TAKE 1 TABLET BY MOUTH TWICE A DAY FOR ACID REFLUX 10/13/14  Yes Vianey Murrell MD   Lancets (ACCU-CHEK SOFTCLIX LANCETS) misc Use as dir 4/18/14  Yes Vianey Murrell MD   Blood-Glucose Meter monitoring kit For twice a day testing 2/27/14  Yes Irene Aparicio NP   oxycodone-acetaminophen (PERCOCET)  mg per tablet Take 1 Tab by mouth three (3) times daily.  9/29/10  Yes Historical Provider cetirizine (ZYRTEC) 10 mg tablet Take  by mouth daily. Yes Historical Provider   cyclobenzaprine (FLEXERIL) 10 mg tablet  7/3/16   Historical Provider   fentaNYL (DURAGESIC) 25 mcg/hr PATCH 1 Patch by TransDERmal route every seventy-two (72) hours. Historical Provider          Allergies   Allergen Reactions    Penicillins Rash, Itching and Swelling     Of throat    Bactrim [Sulfamethoprim Ds] Rash    Biaxin [Clarithromycin] Rash    Ciprofloxacin Rash    Ivp Dye [Fd And C Blue No.1] Rash    Keflex [Cephalexin] Rash    Nsaids (Non-Steroidal Anti-Inflammatory Drug) Rash    Simvastatin Myalgia    Tetracycline Rash         ROS  See HPI    Objective:   Physical Exam   Constitutional: She is oriented to person, place, and time. She appears well-developed and well-nourished. Neck: Normal range of motion. Neck supple. No JVD present. Carotid bruit is present (bilaterally L>R). No thyromegaly present. Cardiovascular: Normal rate and regular rhythm. Exam reveals decreased pulses. Exam reveals no gallop and no friction rub. Murmur heard. Pulses:       Carotid pulses are on the right side with bruit, and on the left side with bruit. Pulmonary/Chest: Effort normal. No respiratory distress. She has no decreased breath sounds. She has no wheezes. She has no rhonchi. Musculoskeletal: She exhibits no edema. Lymphadenopathy:     She has no cervical adenopathy. Neurological: She is alert and oriented to person, place, and time. Diminished sensation with filament testing. Psychiatric: She has a normal mood and affect. Her behavior is normal.   Nursing note and vitals reviewed. Visit Vitals    /57 (BP 1 Location: Right arm, BP Patient Position: Sitting)    Pulse 76    Temp 98.6 °F (37 °C) (Oral)    Resp 16    Ht 5' 6\" (1.676 m)    Wt 175 lb 6.4 oz (79.6 kg)    SpO2 96%    BMI 28.31 kg/m2       Assessment & Plan: Andra Peterson was seen today for cholesterol problem.     Diagnoses and all orders for this visit:    Hyperlipidemia, unspecified hyperlipidemia type  Not to goal, but refuse statin therapy. -     METABOLIC PANEL, COMPREHENSIVE  -     LIPID PANEL    DDD (degenerative disc disease), lumbar  Under pain management contract and followed by surgeon. PVD (peripheral vascular disease) ([facility]  Monitored by vascular surgeon, no changes. Chronic obstructive pulmonary disease, unspecified COPD type ([facility]  Stable, no changes to current therapy    Anxiety  Failure with multiple SSRIs. Stable on Xanax TID. Coronary artery disease due to lipid rich plaque  Managed by cardiology, no changes. Tobacco abuse  Counseled patient on need to quit smoking. Vitamin D deficiency  -     VITAMIN D, 25 HYDROXY  -     Begin ergocalciferol (ERGOCALCIFEROL) 50,000 unit capsule; Take 1 Cap by mouth every seven (7) days. Encounter for immunization  -     pneumococcal 13 sugar conj dip (PREVNAR-13) 0.5 mL syrg injection; 0.5 mL by IntraMUSCular route once for 1 dose. Screening for colon cancer  -     OCCULT BLOOD, IMMUNOASSAY (FIT)    Postmenopausal  -     DEXA BONE DENSITY STUDY AXIAL; Future    IGT (impaired glucose tolerance)  -     HEMOGLOBIN A1C W/O EAG    Need for hepatitis C screening test  -     HEPATITIS C AB    Medicare annual wellness visit, subsequent    ACP (advance care planning)  Discussed importance of living will; paperwork provided on [employer]      I have discussed the diagnosis with the patient and the intended plan as seen in the above orders. The patient has received an after-visit summary along with patient information handout. I have discussed medication side effects and warnings with the patient as well. Follow-up Disposition:  Return in about 6 months (around 1/19/2018) for disease management.         Norbert Reza NP

## 2017-07-19 NOTE — PROGRESS NOTES
Chief Complaint   Patient presents with    Cholesterol Problem     6 months follow up     1. Have you been to the ER, urgent care clinic since your last visit? Hospitalized since your last visit[facility]    2. Have you seen or consulted any other health care providers outside of the [address] [place] [address] since your last visit? Include any pap smears or colon screening. Yes.

## 2017-07-25 ENCOUNTER — TELEPHONE (OUTPATIENT)
Dept: FAMILY MEDICINE CLINIC | Age: 67
End: 2017-07-25

## 2017-07-25 NOTE — TELEPHONE ENCOUNTER
Ivette Espitia from [place] [address] [facility] states that she received the orders for diabetic shoes for Robles Mendez but she states that the orders were filled out incorrectly and signed by a Nurse Practitioner. She states that \"it clearly states on the form that it has to filled out and signed by an MD\". She is re-faxing blank forms to be re-filled out.   She states that the patient's feet must be examined by an MD.

## 2017-07-25 NOTE — TELEPHONE ENCOUNTER
[phone] spoke to  Scott Hemphill per Scott Hemphill patient must be examined by MD Therese Tello note is not valid     [phone] (home)   Attempted to call patient line is busy will try again later

## 2017-07-25 NOTE — TELEPHONE ENCOUNTER
Faxed Certifying Physician for Therapeutic footwear to [phone] and scan    Faxed Office notes for Dr Yunior Hodge [facility] [phone] per patients request

## 2017-07-25 NOTE — TELEPHONE ENCOUNTER
Patient called back advised them that they need to be seen by MD per patient they will talk to you about it later.  Patient refused to make appointment with our MD

## 2017-07-25 NOTE — TELEPHONE ENCOUNTER
[phone] attempted to call patient no answer left message to call me back in regard to [facility] forms that Alex Dougherty filled out

## 2017-08-08 ENCOUNTER — HOSPITAL ENCOUNTER (OUTPATIENT)
Dept: LAB | Age: 67
Discharge: HOME | End: 2017-08-08
Payer: MEDICARE

## 2017-08-08 PROCEDURE — 82270 OCCULT BLOOD FECES: CPT

## 2017-08-13 LAB — HEMOCCULT STL QL IA: NEGATIVE

## 2017-09-21 NOTE — PROGRESS NOTES
Pt unable to see PCP for anesthesia clearance, labs, CXR, EKG for MRI appt 9/25/17. Pt will set up appt with PCP when he returns to office next week. Pt told she could get in earlier than Nov. 6th at [facility] for anesthesia. She only wants to come to [facility]. She will call MRI nurse when she has her PCP appt. scheduled.

## 2017-10-18 ENCOUNTER — HOSPITAL ENCOUNTER (OUTPATIENT)
Dept: LAB | Age: 67
Discharge: HOME | End: 2017-10-18
Payer: MEDICARE

## 2017-10-18 ENCOUNTER — OFFICE VISIT (OUTPATIENT)
Dept: FAMILY MEDICINE CLINIC | Age: 67
End: 2017-10-18

## 2017-10-18 VITALS
WEIGHT: 178.4 LBS | DIASTOLIC BLOOD PRESSURE: 61 MMHG | HEART RATE: 82 BPM | OXYGEN SATURATION: 95 % | TEMPERATURE: 98.1 F | HEIGHT: 66 IN | RESPIRATION RATE: 16 BRPM | SYSTOLIC BLOOD PRESSURE: 142 MMHG | BODY MASS INDEX: 28.67 KG/M2

## 2017-10-18 DIAGNOSIS — I73.9 PVD (PERIPHERAL VASCULAR DISEASE) (HCC): ICD-10-CM

## 2017-10-18 DIAGNOSIS — Z01.818 PREOP EXAMINATION: Primary | ICD-10-CM

## 2017-10-18 DIAGNOSIS — J44.9 CHRONIC OBSTRUCTIVE PULMONARY DISEASE, UNSPECIFIED COPD TYPE (HCC): ICD-10-CM

## 2017-10-18 PROCEDURE — 80053 COMPREHEN METABOLIC PANEL: CPT

## 2017-10-18 PROCEDURE — 36415 COLL VENOUS BLD VENIPUNCTURE: CPT

## 2017-10-18 PROCEDURE — 85025 COMPLETE CBC W/AUTO DIFF WBC: CPT

## 2017-10-18 RX ORDER — AZITHROMYCIN 250 MG/1
TABLET, FILM COATED ORAL
Qty: 6 TAB | Refills: 0 | Status: SHIPPED | OUTPATIENT
Start: 2017-10-18 | End: 2017-10-23

## 2017-10-18 NOTE — PROGRESS NOTES
1. Have you been to the ER, urgent care clinic since your last visit? Hospitalized since your last visit? No    2. Have you seen or consulted any other health care providers outside of the [address] [place] [address] since your last visit? Include any pap smears or colon screening. Yes. Pain management  on 10/11/17    Eye exam is done in Feb 2017 with Dr. Zulay Pham with [facility].     Not fasting    Chief Complaint   Patient presents with    Pre Procedure Assessment     States wants clearance for MRI for 11/06/2017 , needs EKG, Chest Xray, CBC, Complete metabolic panel, and A&P for general anesthesia

## 2017-10-18 NOTE — PROGRESS NOTES
Preoperative Evaluation    Date of Exam: 10/18/2017    Abbie Marquez is a 74 y.o. female (:1950) who presents for preoperative evaluation. Procedure/Surgery: lumbar MRI under general anesthesia  Date of Procedure/Surgery: 17  Surgeon: Dr. Miller Check: [facility]   Primary Physician: Soraya Cerda, NP  Latex Allergy: no    Medical History:     Past Medical History:   Diagnosis Date    Allergic rhinitis     Anxiety     CAD (coronary artery disease)     Dr Denise Pearson Chronic low back pain     COPD [facility] obstructive [facility] disease) ([facility] [address]     Ganglion cyst     GERD (gastroesophageal reflux disease)     Goiter, nodular     Hyperlipidemia     Occlusion and stenosis of right vertebral artery     Pelvic fracture ([facility] [address]     Squamous cell carcinoma of scalp     Subclavian artery stenosis, left (HCC)     Tobacco abuse      Allergies: Allergies   Allergen Reactions    Penicillins Rash, Itching and Swelling     Of throat    Bactrim [Sulfamethoprim Ds] Rash    Biaxin [Clarithromycin] Rash    Ciprofloxacin Rash    Ivp Dye [Fd And C Blue No.1] Rash    Keflex [Cephalexin] Rash    Nsaids (Non-Steroidal Anti-Inflammatory Drug) Rash    Simvastatin Myalgia    Tetracycline Rash      Medications:     Current Outpatient Prescriptions   Medication Sig    azithromycin (ZITHROMAX) 250 mg tablet Take 2 tablets today, then take 1 tablet daily    ACCU-CHEK COMPACT PLUS TEST strp USE TO TEST FOR BLOOD SUGAR TWICE DAILY    ALPRAZolam (XANAX) 0.5 mg tablet TAKE 1 TABLET BY MOUTH THREE TIMES DAILY AS NEEDED FOR ANXIETY    tiZANidine (ZANAFLEX) 2 mg tablet     levalbuterol tartrate (XOPENEX) 45 mcg/actuation inhaler Take 1-2 Puffs by inhalation every four (4) hours as needed for Wheezing.  gabapentin (NEURONTIN) 600 mg tablet Take  by mouth three (3) times daily.     ranitidine (ZANTAC) 150 mg tablet TAKE 1 TABLET BY MOUTH TWICE A DAY FOR ACID REFLUX  Lancets (ACCU-CHEK SOFTCLIX LANCETS) misc Use as dir    Blood-Glucose Meter monitoring kit For twice a day testing    oxycodone-acetaminophen (PERCOCET)  mg per tablet Take 1 Tab by mouth three (3) times daily.  cetirizine (ZYRTEC) 10 mg tablet Take  by mouth daily. No current facility-administered medications for this visit. Surgical History:     Past Surgical History:   Procedure Laterality Date    HX CARPAL TUNNEL RELEASE      HX CYST REMOVAL      HX HEART CATHETERIZATION  2011    SHOULDER SURG PROC UNLISTED      left shoulder     Social History:     Social History    Marital status:      Spouse name: N/A    Number of children: 1    Years of education: N/A     Social History Main Topics    Smoking status: Current Every Day Smoker     Packs/day: 0.50     Years: 30.00     Types: Cigarettes    Smokeless tobacco: Never Used    Alcohol use No    Drug use: No     Anesthesia Complications: None  History of abnormal bleeding : None  History of Blood Transfusions: no  Health Care Directive or Living Will: no    REVIEW OF SYSTEMS:  A comprehensive review of systems was negative. EXAM:   Visit Vitals    /61 (BP 1 Location: Right arm, BP Patient Position: Sitting)    Pulse 82    Temp 98.1 °F (36.7 °C) (Oral)    Resp 16    Ht 5' 6\" (1.676 m)    Wt 178 lb 6.4 oz (80.9 kg)    SpO2 95%    BMI 28.79 kg/m2     Const: AO x 3, well groomed and pleasant female  CV: RRR, no M/R/G, no edema, peripheral pulses palpable  Resp: diminished lung sounds throughout, no clubbing or cyanosis  Neck: left carotid bruit, no JVD, supple, no thyromegaly  Eyes: EOMs intact, PERRLA  HENT: TMs intact, O/P clear, nares patent  Lymph: no cervical, supraclavicular, axillary or inguinal nodes  Skin: uniform and clear  Abd: BS active x 4, no bruits, soft, non-tender, no guarding or rebound tenderness, no organomegaly or masses.   Neuro: Good gait and balance, CN II-XII intact, DTRs 2+ throughout  MS: ROM and strength appropriate for age. DIAGNOSTICS:   1. EKG: EKG FINDINGS - normal EKG, normal sinus rhythm, LBBB  2. CXR: was negative for acute cardiopulmonary process  3. Labs: pending    IMPRESSION:   Patient is high risk. Patient underwent cardiac clearance with nuclear stress test in April 2017 with Dr. April Valencia. There have been no significant changes since that time. Based on the above evaluation, the patient is stable and cleared for surgery.         Dylan Soto, SALVADOR   10/18/2017

## 2017-10-19 LAB
ALBUMIN SERPL-MCNC: 4.1 G/DL (ref 3.6–4.8)
ALBUMIN/GLOB SERPL: 1.2 {RATIO} (ref 1.2–2.2)
ALP SERPL-CCNC: 89 IU/L (ref 39–117)
ALT SERPL-CCNC: 23 IU/L (ref 0–32)
AST SERPL-CCNC: 20 IU/L (ref 0–40)
BASOPHILS # BLD AUTO: 0.1 X10E3/UL (ref 0–0.2)
BASOPHILS NFR BLD AUTO: 1 %
BILIRUB SERPL-MCNC: 0.3 MG/DL (ref 0–1.2)
BUN SERPL-MCNC: 10 MG/DL (ref 8–27)
BUN/CREAT SERPL: 13 (ref 12–28)
CALCIUM SERPL-MCNC: 9.4 MG/DL (ref 8.7–10.3)
CHLORIDE SERPL-SCNC: 97 MMOL/L (ref 96–106)
CO2 SERPL-SCNC: 28 MMOL/L (ref 18–29)
CREAT SERPL-MCNC: 0.79 MG/DL (ref 0.57–1)
EOSINOPHIL # BLD AUTO: 0.2 X10E3/UL (ref 0–0.4)
EOSINOPHIL NFR BLD AUTO: 3 %
ERYTHROCYTE [DISTWIDTH] IN BLOOD BY AUTOMATED COUNT: 14.5 % (ref 12.3–15.4)
GLOBULIN SER CALC-MCNC: 3.3 G/DL (ref 1.5–4.5)
GLUCOSE SERPL-MCNC: 81 MG/DL (ref 65–99)
HCT VFR BLD AUTO: 44.2 % (ref 34–46.6)
HGB BLD-MCNC: 15.5 G/DL (ref 11.1–15.9)
IMM GRANULOCYTES # BLD: 0 X10E3/UL (ref 0–0.1)
IMM GRANULOCYTES NFR BLD: 0 %
LYMPHOCYTES # BLD AUTO: 2.2 X10E3/UL (ref 0.7–3.1)
LYMPHOCYTES NFR BLD AUTO: 35 %
MCH RBC QN AUTO: 30.2 PG (ref 26.6–33)
MCHC RBC AUTO-ENTMCNC: 35.1 G/DL (ref 31.5–35.7)
MCV RBC AUTO: 86 FL (ref 79–97)
MONOCYTES # BLD AUTO: 0.7 X10E3/UL (ref 0.1–0.9)
MONOCYTES NFR BLD AUTO: 11 %
NEUTROPHILS # BLD AUTO: 3.1 X10E3/UL (ref 1.4–7)
NEUTROPHILS NFR BLD AUTO: 50 %
PLATELET # BLD AUTO: 212 X10E3/UL (ref 150–379)
POTASSIUM SERPL-SCNC: 4.4 MMOL/L (ref 3.5–5.2)
PROT SERPL-MCNC: 7.4 G/DL (ref 6–8.5)
RBC # BLD AUTO: 5.14 X10E6/UL (ref 3.77–5.28)
SODIUM SERPL-SCNC: 139 MMOL/L (ref 134–144)
WBC # BLD AUTO: 6.2 X10E3/UL (ref 3.4–10.8)

## 2017-11-06 ENCOUNTER — ANESTHESIA EVENT (OUTPATIENT)
Dept: MRI IMAGING | Age: 67
End: 2017-11-06
Payer: MEDICARE

## 2017-11-06 ENCOUNTER — ANESTHESIA (OUTPATIENT)
Dept: MRI IMAGING | Age: 67
End: 2017-11-06
Payer: MEDICARE

## 2017-11-06 ENCOUNTER — HOSPITAL ENCOUNTER (OUTPATIENT)
Dept: MRI IMAGING | Age: 67
Discharge: HOME | End: 2017-11-06
Attending: ORTHOPAEDIC SURGERY
Payer: MEDICARE

## 2017-11-06 VITALS
WEIGHT: 178.35 LBS | SYSTOLIC BLOOD PRESSURE: 134 MMHG | TEMPERATURE: 97 F | HEIGHT: 66 IN | OXYGEN SATURATION: 93 % | BODY MASS INDEX: 28.66 KG/M2 | HEART RATE: 102 BPM | DIASTOLIC BLOOD PRESSURE: 69 MMHG | RESPIRATION RATE: 16 BRPM

## 2017-11-06 DIAGNOSIS — M51.36 DISC DEGENERATION, LUMBAR: ICD-10-CM

## 2017-11-06 DIAGNOSIS — M54.16 LUMBAR RADICULITIS: ICD-10-CM

## 2017-11-06 DIAGNOSIS — G89.29 CHRONIC BILATERAL LOW BACK PAIN WITHOUT SCIATICA: ICD-10-CM

## 2017-11-06 DIAGNOSIS — M41.50 DEGENERATIVE SCOLIOSIS: ICD-10-CM

## 2017-11-06 DIAGNOSIS — M54.50 CHRONIC BILATERAL LOW BACK PAIN WITHOUT SCIATICA: ICD-10-CM

## 2017-11-06 LAB
GLUCOSE BLD STRIP.AUTO-MCNC: 81 MG/DL (ref 65–100)
GLUCOSE BLD STRIP.AUTO-MCNC: 97 MG/DL (ref 65–100)
SERVICE CMNT-IMP: NORMAL
SERVICE CMNT-IMP: NORMAL

## 2017-11-06 PROCEDURE — 76060000034 HC ANESTHESIA 1.5 TO 2 HR

## 2017-11-06 PROCEDURE — 82962 GLUCOSE BLOOD TEST: CPT

## 2017-11-06 PROCEDURE — 74011000250 HC RX REV CODE- 250

## 2017-11-06 PROCEDURE — 76060000080 MRI LUMB SPINE WO CONT

## 2017-11-06 PROCEDURE — 72146 MRI CHEST SPINE W/O DYE: CPT

## 2017-11-06 PROCEDURE — 74011250636 HC RX REV CODE- 250/636

## 2017-11-06 PROCEDURE — 76210000020 HC REC RM PH II FIRST 0.5 HR

## 2017-11-06 PROCEDURE — 76210000006 HC OR PH I REC 0.5 TO 1 HR

## 2017-11-06 RX ORDER — DEXMEDETOMIDINE HYDROCHLORIDE 4 UG/ML
INJECTION, SOLUTION INTRAVENOUS AS NEEDED
Status: DISCONTINUED | OUTPATIENT
Start: 2017-11-06 | End: 2017-11-06 | Stop reason: HOSPADM

## 2017-11-06 RX ORDER — SODIUM CHLORIDE, SODIUM LACTATE, POTASSIUM CHLORIDE, CALCIUM CHLORIDE 600; 310; 30; 20 MG/100ML; MG/100ML; MG/100ML; MG/100ML
INJECTION, SOLUTION INTRAVENOUS
Status: DISCONTINUED | OUTPATIENT
Start: 2017-11-06 | End: 2017-11-06 | Stop reason: HOSPADM

## 2017-11-06 RX ORDER — MIDAZOLAM HYDROCHLORIDE 1 MG/ML
INJECTION, SOLUTION INTRAMUSCULAR; INTRAVENOUS AS NEEDED
Status: DISCONTINUED | OUTPATIENT
Start: 2017-11-06 | End: 2017-11-06 | Stop reason: HOSPADM

## 2017-11-06 RX ORDER — PROPOFOL 10 MG/ML
INJECTION, EMULSION INTRAVENOUS AS NEEDED
Status: DISCONTINUED | OUTPATIENT
Start: 2017-11-06 | End: 2017-11-06 | Stop reason: HOSPADM

## 2017-11-06 RX ADMIN — MIDAZOLAM HYDROCHLORIDE 5 MG: 1 INJECTION, SOLUTION INTRAMUSCULAR; INTRAVENOUS at 11:54

## 2017-11-06 RX ADMIN — DEXMEDETOMIDINE HYDROCHLORIDE 4 MCG: 4 INJECTION, SOLUTION INTRAVENOUS at 11:53

## 2017-11-06 RX ADMIN — PROPOFOL 20 MG: 10 INJECTION, EMULSION INTRAVENOUS at 12:22

## 2017-11-06 RX ADMIN — PROPOFOL 20 MG: 10 INJECTION, EMULSION INTRAVENOUS at 12:17

## 2017-11-06 RX ADMIN — DEXMEDETOMIDINE HYDROCHLORIDE 4 MCG: 4 INJECTION, SOLUTION INTRAVENOUS at 11:58

## 2017-11-06 RX ADMIN — PROPOFOL 50 MG: 10 INJECTION, EMULSION INTRAVENOUS at 12:04

## 2017-11-06 RX ADMIN — PROPOFOL 20 MG: 10 INJECTION, EMULSION INTRAVENOUS at 12:10

## 2017-11-06 RX ADMIN — PROPOFOL 20 MG: 10 INJECTION, EMULSION INTRAVENOUS at 13:00

## 2017-11-06 RX ADMIN — PROPOFOL 20 MG: 10 INJECTION, EMULSION INTRAVENOUS at 12:27

## 2017-11-06 RX ADMIN — SODIUM CHLORIDE, SODIUM LACTATE, POTASSIUM CHLORIDE, CALCIUM CHLORIDE: 600; 310; 30; 20 INJECTION, SOLUTION INTRAVENOUS at 11:48

## 2017-11-06 RX ADMIN — PROPOFOL 20 MG: 10 INJECTION, EMULSION INTRAVENOUS at 12:32

## 2017-11-06 RX ADMIN — PROPOFOL 20 MG: 10 INJECTION, EMULSION INTRAVENOUS at 12:14

## 2017-11-06 RX ADMIN — DEXMEDETOMIDINE HYDROCHLORIDE 4 MCG: 4 INJECTION, SOLUTION INTRAVENOUS at 11:55

## 2017-11-06 RX ADMIN — PROPOFOL 20 MG: 10 INJECTION, EMULSION INTRAVENOUS at 12:48

## 2017-11-06 RX ADMIN — DEXMEDETOMIDINE HYDROCHLORIDE 4 MCG: 4 INJECTION, SOLUTION INTRAVENOUS at 11:56

## 2017-11-06 RX ADMIN — PROPOFOL 20 MG: 10 INJECTION, EMULSION INTRAVENOUS at 13:06

## 2017-11-06 RX ADMIN — PROPOFOL 100 MG: 10 INJECTION, EMULSION INTRAVENOUS at 12:00

## 2017-11-06 RX ADMIN — PROPOFOL 20 MG: 10 INJECTION, EMULSION INTRAVENOUS at 12:41

## 2017-11-06 RX ADMIN — PROPOFOL 20 MG: 10 INJECTION, EMULSION INTRAVENOUS at 12:34

## 2017-11-06 NOTE — IP AVS SNAPSHOT
[address] 
[phone] Patient: Jeffrey Dumont MRN: [id number] JAP:[birth year] About your hospitalization You were admitted on:  November 6, 2017 You last received care in the:  [facility] MRI Department You were discharged on:  November 6, 2017 Why you were hospitalized Your primary diagnosis was:  Not on File Things You Need To Do (next 8 weeks) Call Kathie Coppola MD today  
call for results of MRI Phone:  [phone] Where:  [facility], [address], [place] [id number] Discharge Orders None A check sadiq indicates which time of day the medication should be taken. My Medications ASK your physician about these medications Instructions Each Dose to Equal  
 Morning Noon Evening Bedtime ACCU-CHEK COMPACT PLUS TEST Strp Generic drug:  Blood Sugar Diagnostic, Drum Your last dose was: Your next dose is:    
   
   
 USE TO TEST FOR BLOOD SUGAR TWICE DAILY ALPRAZolam 0.5 mg tablet Commonly known as:  [place] Curio Your last dose was: Your next dose is: TAKE 1 TABLET BY MOUTH THREE TIMES DAILY AS NEEDED FOR ANXIETY Blood-Glucose Meter monitoring kit Your last dose was: Your next dose is: For twice a day testing  
     
   
   
   
  
 gabapentin 600 mg tablet Commonly known as:  NEURONTIN Your last dose was: Your next dose is: Take  by mouth three (3) times daily. Lancets Misc Commonly known as:  ACCU-CHEK SOFTCLIX LANCETS Your last dose was: Your next dose is:    
   
   
 Use as dir  
     
   
   
   
  
 levalbuterol tartrate 45 mcg/actuation inhaler Commonly known as:  [place] Rotunda Your last dose was: Your next dose is: Take 1-2 Puffs by inhalation every four (4) hours as needed for Wheezing. 1-2 Puff PERCOCET  mg per tablet Generic drug:  oxyCODONE-acetaminophen Your last dose was: Your next dose is: Take 1 Tab by mouth three (3) times daily. 1 Tab  
    
   
   
   
  
 raNITIdine 150 mg tablet Commonly known as:  ZANTAC Your last dose was: Your next dose is: TAKE 1 TABLET BY MOUTH TWICE A DAY FOR ACID REFLUX  
     
   
   
   
  
 tiZANidine 2 mg tablet Commonly known as:  Luisana Sleek Your last dose was: Your next dose is:    
   
   
      
   
   
   
  
 ZyrTEC 10 mg tablet Generic drug:  cetirizine Your last dose was: Your next dose is: Take  by mouth daily. Discharge Instructions   
  
______________________________________________________________________ Anesthesia Discharge Instructions After general anesthesia or intervenous sedation, for 24 hours or while taking prescription Narcotics: · Limit your activities · Do not drive or operate hazardous machinery · If you have not urinated within 8 hours after discharge, please contact your surgeon on call. · Do not make important personal or business decisions · Do not drink alcoholic beverages Report the following to your surgeon: 
· Excessive pain, swelling, redness or odor of or around the surgical area · Temperature over 100.5 degrees · Nausea and vomiting lasting longer than 4 hours or if unable to take medication · Any signs of decreased circulation or nerve impairment to extremity:  Change in color, persistent numbness, tingling, coldness or increased pain. · Any questions Introducing [facility]! [facility] introduces [employer] patient portal. Now you can access parts of your medical record, email your doctor's office, and request medication refills online. 1. In your internet browser, go to [email or web address] 2. Click on the First Time User? Click Here link in the Sign In box. You will see the New Member Sign Up page. 3. Enter your [facility] Access Code exactly as it appears below. You will not need to use this code after youve completed the sign-up process. If you do not sign up before the expiration date, you must request a new code. · [facility] Access Code: [email or web address] Expires: 1/16/2018  8:48 AM 
 
4. Enter the last four digits of your Social Security Number (xxxx) and Date of Birth (mm/dd/yyyy) as indicated and click Submit. You will be taken to the next sign-up page. 5. Create a [facility] ID. This will be your [facility] login ID and cannot be changed, so think of one that is secure and easy to remember. 6. Create a [facility] password. You can change your password at any time. 7. Enter your Password Reset Question and Answer. This can be used at a later time if you forget your password. 8. Enter your e-mail address. You will receive e-mail notification when new information is available in [address] 9. Click Sign Up. You can now view and download portions of your medical record. 10. Click the Download Summary menu link to download a portable copy of your medical information. If you have questions, please visit the Frequently Asked Questions section of the [facility] website. Remember, [facility] is NOT to be used for urgent needs. For medical emergencies, dial 911. Now available from your iPhone and Android! Unresulted Labs-Please follow up with your PCP about these lab tests Order Current Status MRI LUMB SPINE WO CONT In process MRI [facility] SPINE WO CONT In process Providers Seen During Your Hospitalization Provider Specialty Primary office phone Aster Sewell MD Orthopedic Surgery [phone] Your Primary Care Physician (PCP) Primary Care Physician Office Phone Office Fax Randi Radha [phone] You are allergic to the following Allergen Reactions Penicillins Rash Itching Swelling Of throat Prednisone Anaphylaxis Bactrim (Sulfamethoprim Ds) Rash Biaxin (Clarithromycin) Rash Ciprofloxacin Rash Ivp Dye (Fd And C Blue No.1) Rash Keflex (Cephalexin) Rash Nickel Contact Dermatitis Nsaids (Non-Steroidal Anti-Inflammatory Drug) Rash Simvastatin Myalgia Tetracycline Rash Recent Documentation Height Weight BMI OB Status Smoking Status 1.676 m 80.9 kg 28.79 kg/m2 Postmenopausal Current Every Day Smoker Emergency Contacts Name Discharge Info Relation Home Work Mobile Franklin Sloan [3] [phone] Merry Juana [3] [phone] Patient Belongings The following personal items are in your possession at time of discharge: 
     Visual Aid: None Please provide this summary of care documentation to your next provider. Signatures-by signing, you are acknowledging that this After Visit Summary has been reviewed with you and you have received a copy. Patient Signature:  ____________________________________________________________ Date:  ____________________________________________________________  
  
Tiana Cruz Provider Signature:  ____________________________________________________________ Date:  ____________________________________________________________

## 2017-11-06 NOTE — ANESTHESIA PREPROCEDURE EVALUATION
Anesthetic History   No history of anesthetic complications            Review of Systems / Medical History  Patient summary reviewed, nursing notes reviewed and pertinent labs reviewed    Pulmonary    COPD      Smoker         Neuro/Psych         Psychiatric history     Cardiovascular                  Exercise tolerance: <4 METS     GI/Hepatic/Renal     GERD           Endo/Other      Hypothyroidism       Other Findings              Physical Exam    Airway  Mallampati: II  TM Distance: > 6 cm  Neck ROM: normal range of motion        Cardiovascular  Regular rate and rhythm,  S1 and S2 normal,  no murmur, click, rub, or gallop             Dental    Dentition: Poor dentition and Upper partial plate     Pulmonary  Breath sounds clear to auscultation               Abdominal  GI exam deferred       Other Findings            Anesthetic Plan    ASA: 3  Anesthesia type: MAC            Anesthetic plan and risks discussed with: Patient

## 2017-11-08 NOTE — ANESTHESIA POSTPROCEDURE EVALUATION
Post-Anesthesia Evaluation and Assessment    Patient: Wai Arevalo MRN: [id number]  SSN: [id number]    YOB: 1950  Age: 74 y.o. Sex: female       Cardiovascular Function/Vital Signs  Visit Vitals    /69    Pulse (!) 102    Temp 36.1 °C (97 °F)    Resp 16    Ht 5' 6\" (1.676 m)    Wt 80.9 kg (178 lb 5.6 oz)    SpO2 93%    BMI 28.79 kg/m2       Patient is status post MAC anesthesia for * No procedures listed *. Nausea/Vomiting: None    Postoperative hydration reviewed and adequate. Pain:  Pain Scale 1: Numeric (0 - 10) (11/06/17 1432)  Pain Intensity 1: 7 (11/06/17 1432)   Managed    Neurological Status:   Neuro (WDL): Within Defined Limits (11/06/17 1406)   At baseline    Mental Status and Level of Consciousness: Arousable    Pulmonary Status:   O2 Device: Room air (11/06/17 1433)   Adequate oxygenation and airway patent    Complications related to anesthesia: None    Post-anesthesia assessment completed.  No concerns    Signed By: Charlie Kwan MD     November 7, 2017

## 2017-12-20 DIAGNOSIS — F41.9 ANXIETY: ICD-10-CM

## 2017-12-20 NOTE — TELEPHONE ENCOUNTER
Pharmacy on file verified.  Pharmacy sent a fax  Requested Prescriptions     Pending Prescriptions Disp Refills    ALPRAZolam (XANAX) 0.5 mg tablet 90 Tab 5

## 2017-12-20 NOTE — TELEPHONE ENCOUNTER
LOV 10/18/2017    The Prescription Monitoring Program has been reviewed for recent activity regarding controlled substances for this patient.      Per  last refill 11/26/2017 #90    MME/D :

## 2017-12-22 RX ORDER — ALPRAZOLAM 0.5 MG/1
0.5 TABLET ORAL
Qty: 90 TAB | Refills: 1 | Status: SHIPPED | OUTPATIENT
Start: 2017-12-22 | End: 2018-01-24 | Stop reason: SDUPTHER

## 2018-01-12 ENCOUNTER — HOSPITAL ENCOUNTER (OUTPATIENT)
Dept: LAB | Age: 68
Discharge: HOME | End: 2018-01-12
Payer: MEDICARE

## 2018-01-12 PROCEDURE — 80053 COMPREHEN METABOLIC PANEL: CPT

## 2018-01-12 PROCEDURE — 86803 HEPATITIS C AB TEST: CPT

## 2018-01-12 PROCEDURE — 82306 VITAMIN D 25 HYDROXY: CPT

## 2018-01-12 PROCEDURE — 36415 COLL VENOUS BLD VENIPUNCTURE: CPT

## 2018-01-12 PROCEDURE — 83036 HEMOGLOBIN GLYCOSYLATED A1C: CPT

## 2018-01-12 PROCEDURE — 80061 LIPID PANEL: CPT

## 2018-01-13 LAB
25(OH)D3+25(OH)D2 SERPL-MCNC: 18.1 NG/ML (ref 30–100)
ALBUMIN SERPL-MCNC: 4.4 G/DL (ref 3.6–4.8)
ALBUMIN/GLOB SERPL: 1.7 {RATIO} (ref 1.2–2.2)
ALP SERPL-CCNC: 80 IU/L (ref 39–117)
ALT SERPL-CCNC: 22 IU/L (ref 0–32)
AST SERPL-CCNC: 19 IU/L (ref 0–40)
BILIRUB SERPL-MCNC: 0.5 MG/DL (ref 0–1.2)
BUN SERPL-MCNC: 10 MG/DL (ref 8–27)
BUN/CREAT SERPL: 15 (ref 12–28)
CALCIUM SERPL-MCNC: 9.4 MG/DL (ref 8.7–10.3)
CHLORIDE SERPL-SCNC: 98 MMOL/L (ref 96–106)
CHOLEST SERPL-MCNC: 214 MG/DL (ref 100–199)
CO2 SERPL-SCNC: 25 MMOL/L (ref 18–29)
CREAT SERPL-MCNC: 0.68 MG/DL (ref 0.57–1)
GLOBULIN SER CALC-MCNC: 2.6 G/DL (ref 1.5–4.5)
GLUCOSE SERPL-MCNC: 90 MG/DL (ref 65–99)
HBA1C MFR BLD: 5.6 % (ref 4.8–5.6)
HCV AB S/CO SERPL IA: <0.1 S/CO RATIO (ref 0–0.9)
HDLC SERPL-MCNC: 68 MG/DL
INTERPRETATION, 910389: NORMAL
LDLC SERPL CALC-MCNC: 128 MG/DL (ref 0–99)
POTASSIUM SERPL-SCNC: 4.4 MMOL/L (ref 3.5–5.2)
PROT SERPL-MCNC: 7 G/DL (ref 6–8.5)
SODIUM SERPL-SCNC: 139 MMOL/L (ref 134–144)
TRIGL SERPL-MCNC: 89 MG/DL (ref 0–149)
VLDLC SERPL CALC-MCNC: 18 MG/DL (ref 5–40)

## 2018-01-24 ENCOUNTER — OFFICE VISIT (OUTPATIENT)
Dept: FAMILY MEDICINE CLINIC | Age: 68
End: 2018-01-24

## 2018-01-24 VITALS
BODY MASS INDEX: 28.45 KG/M2 | OXYGEN SATURATION: 94 % | HEART RATE: 87 BPM | HEIGHT: 66 IN | DIASTOLIC BLOOD PRESSURE: 68 MMHG | RESPIRATION RATE: 18 BRPM | WEIGHT: 177 LBS | SYSTOLIC BLOOD PRESSURE: 130 MMHG | TEMPERATURE: 98.2 F

## 2018-01-24 DIAGNOSIS — J44.9 CHRONIC OBSTRUCTIVE PULMONARY DISEASE, UNSPECIFIED COPD TYPE (HCC): ICD-10-CM

## 2018-01-24 DIAGNOSIS — F41.9 ANXIETY: ICD-10-CM

## 2018-01-24 DIAGNOSIS — I25.10 CORONARY ARTERY DISEASE INVOLVING NATIVE CORONARY ARTERY OF NATIVE HEART WITHOUT ANGINA PECTORIS: ICD-10-CM

## 2018-01-24 DIAGNOSIS — M51.36 DDD (DEGENERATIVE DISC DISEASE), LUMBAR: ICD-10-CM

## 2018-01-24 DIAGNOSIS — I73.9 PVD (PERIPHERAL VASCULAR DISEASE) (HCC): ICD-10-CM

## 2018-01-24 DIAGNOSIS — Z72.0 TOBACCO ABUSE: ICD-10-CM

## 2018-01-24 DIAGNOSIS — E16.2 HYPOGLYCEMIA: ICD-10-CM

## 2018-01-24 DIAGNOSIS — E78.2 MIXED HYPERLIPIDEMIA: Primary | ICD-10-CM

## 2018-01-24 RX ORDER — AZITHROMYCIN 250 MG/1
TABLET, FILM COATED ORAL
Qty: 6 TAB | Refills: 0 | Status: SHIPPED | OUTPATIENT
Start: 2018-01-24 | End: 2018-01-29

## 2018-01-24 RX ORDER — ALPRAZOLAM 0.5 MG/1
0.5 TABLET ORAL
Qty: 90 TAB | Refills: 5 | Status: SHIPPED | OUTPATIENT
Start: 2018-01-24 | End: 2018-08-02 | Stop reason: SDUPTHER

## 2018-01-24 NOTE — PROGRESS NOTES
Janessa Shields is a 74 y.o. female who was seen in clinic today (1/24/2018). Subjective:  Cardiovascular Review:  The patient has hypertension, hyperlipidemia, CAD s/p stenting and peripheral vascular disease. Seen by cardiology, Dr Chata Garcia. Patient underwent nuclear stress test with normal findings. Diet and Lifestyle: generally follows a low fat low cholesterol diet, generally follows a low sodium diet, smoker 1 ppd  Home BP Monitoring: is not measured at home. Pertinent ROS: taking medications as instructed, no medication side effects noted, no TIA's, no chest pain on exertion, no dyspnea on exertion, no swelling of ankles. Patient refuses to take cholesterol medication as it causes myalgias. COPD Review:  The patient is being seen for follow up of COPD and history of 45 pack years tobacco use. Oxygen: She currently is not on home oxygen therapy. Symptoms: chronic dyspnea: severity = not present: course of sx: stable. Patient uses 1 pillows at night. Patient does smoke cigarettes. Peripheral Vascular Disease  Patient in with known h/o PVD. Patient denies symptoms at present. 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 The patient's risks factors for atherosclerosis include smoking, hypertension, peripheral vascular disease, hypercholesterolemia. She is seen by Dr Jerome Glass annually. Osteoarthritis and Chronic Pain:  Patient has osteoarthritis and spondylosis, primarily affecting the back. Symptoms onset: problem is longstanding. Rheumatological ROS: using narcotic analgesics regularly daily in stable pattern with good pain control and good quality of life. Pain managed by Dr. Jack Ramsey - pain management. Patient had lumbar laminectomy in 8/2015 with Dr. Harmony Hansen without improvement. Patient previously  evaluated by Dr. Deanne Feldman. Now seeking another opinion at [facility] or [facility].     Anxiety Review:  Patient is seen for anxiety disorder, panic attacks and claustrophobia. Current treatment includes Xanax TID. Patient on Xanax for 17 years. Patient previously treated Paxil, Zoloft, Prozac, Wellbutrin and had adverse reaction to medications. Ongoing symptoms include: racing thoughts, psychomotor agitation. Patient denies: sweating, chest pain, shortness of breath, difficulty concentrating, suicidal ideation. Reported side effects from the treatment: none. Prior to Admission medications    Medication Sig Start Date End Date Taking? Authorizing Provider   ALPRAZolam Charedwina Wyman) 0.5 mg tablet Take 1 Tab by mouth three (3) times daily as needed for Anxiety. Max Daily Amount: 1.5 mg. 1/24/18  Yes Rinku Ernst NP   azithromycin (ZITHROMAX) 250 mg tablet Take 2 tablets today, then take 1 tablet daily 1/24/18 1/29/18 Yes Rinku Ernst NP   ACCU-CHEK COMPACT PLUS TEST strp USE TO TEST FOR BLOOD SUGAR TWICE DAILY 7/10/17  Yes Rinku Ernst NP   tiZANidine (ZANAFLEX) 2 mg tablet  2/17/16  Yes Historical Provider   levalbuterol tartrate (XOPENEX) 45 mcg/actuation inhaler Take 1-2 Puffs by inhalation every four (4) hours as needed for Wheezing. 10/21/15  Yes Rinku Ernst NP   gabapentin (NEURONTIN) 600 mg tablet Take  by mouth three (3) times daily. Yes Historical Provider   ranitidine (ZANTAC) 150 mg tablet TAKE 1 TABLET BY MOUTH TWICE A DAY FOR ACID REFLUX 10/13/14  Yes Dewayne Carpenter MD   Lancets (ACCU-CHEK SOFTCLIX LANCETS) misc Use as dir 4/18/14  Yes Dewayne Carpenter MD   Blood-Glucose Meter monitoring kit For twice a day testing 2/27/14  Yes Rinku Ernst NP   oxycodone-acetaminophen (PERCOCET)  mg per tablet Take 1 Tab by mouth three (3) times daily. 9/29/10  Yes Historical Provider   cetirizine (ZYRTEC) 10 mg tablet Take  by mouth daily.    Yes Historical Provider          Allergies   Allergen Reactions    Penicillins Rash, Itching and Swelling     Of throat    Prednisone Anaphylaxis    Bactrim [Sulfamethoprim Ds] Rash    Biaxin [Clarithromycin] Rash    Ciprofloxacin Rash    Ivp Dye [Fd And C Blue No.1] Rash    Keflex [Cephalexin] Rash    Nickel Contact Dermatitis    Nsaids (Non-Steroidal Anti-Inflammatory Drug) Rash    Simvastatin Myalgia    Tetracycline Rash           ROS  See HPI    Objective:   Physical Exam   Constitutional: She is oriented to person, place, and time. She appears well-developed and well-nourished. Neck: Normal range of motion. Neck supple. No JVD present. Carotid bruit is present (bilaterally L>R). No thyromegaly present. Cardiovascular: Normal rate and regular rhythm. Exam reveals decreased pulses. Exam reveals no gallop and no friction rub. Murmur heard. Pulses:       Carotid pulses are on the right side with bruit, and on the left side with bruit. Pulmonary/Chest: Effort normal. No respiratory distress. She has no decreased breath sounds. She has no wheezes. She has no rhonchi. Musculoskeletal: She exhibits no edema. Lymphadenopathy:     She has no cervical adenopathy. Neurological: She is alert and oriented to person, place, and time. Psychiatric: She has a normal mood and affect. Her behavior is normal.   Nursing note and vitals reviewed. Visit Vitals    /68 (BP 1 Location: Left arm, BP Patient Position: Sitting)    Pulse 87    Temp 98.2 °F (36.8 °C) (Oral)    Resp 18    Ht 5' 6\" (1.676 m)    Wt 177 lb (80.3 kg)    SpO2 94%    BMI 28.57 kg/m2       Assessment & Plan:  Diagnoses and all orders for this visit:    1. Mixed hyperlipidemia  Remains elevated but cholesterol medication has been refused by patient. Advised patient to begin ASA 81 mg daily. 2. Coronary artery disease involving native coronary artery of native heart without angina pectoris  Managed by cardiology, no changes. 3. Chronic obstructive pulmonary disease, unspecified COPD type ([facility] [address]  Counseled patient on need to quit smoking.     4. PVD (peripheral vascular disease) ([facility] [address]  Managed by vascular surgery, no changes. 5. Tobacco abuse  Counseled patient on need to quit smoking. 6. Anxiety  -     Refill ALPRAZolam (XANAX) 0.5 mg tablet; Take 1 Tab by mouth three (3) times daily as needed for Anxiety. Max Daily Amount: 1.5 mg.    7. DDD (degenerative disc disease), lumbar  Will provide referral to [facility] neurosurgery   -     REFERRAL TO NEUROSURGERY    8. Hypoglycemia  Chronic complaint of patient although not substantiated by labs. She requests a three hour GTT. Referral to endocrinology as needed. -     GLUCOSE TOLERANCE (3 SP BLOOD)      I have discussed the diagnosis with the patient and the intended plan as seen in the above orders. The patient has received an after-visit summary along with patient information handout. I have discussed medication side effects and warnings with the patient as well. Follow-up Disposition:  Return in about 6 months (around 7/24/2018) for disease management.         Kayleigh Alves, SALVADOR

## 2018-01-24 NOTE — MR AVS SNAPSHOT
[address] 
[phone] Patient: Lenny Wynne MRN: [id number] GPX:7/0/6549 Visit Information Date & Time Provider Department Dept. Phone Encounter #  
 1/24/2018 11:30 AM Dontae Muhammad  [facility] [phone] [id number] Upcoming Health Maintenance Date Due  
 GLAUCOMA SCREENING Q2Y 2/3/2017 BREAST CANCER SCRN MAMMOGRAM 3/31/2018 MEDICARE YEARLY EXAM 7/20/2018 Pneumococcal 65+ Low/Medium Risk (2 of 2 - PPSV23) 7/24/2018 FOBT Q 1 YEAR AGE 50-75 8/8/2018 DTaP/Tdap/Td series (2 - Td) 4/20/2021 Allergies as of 1/24/2018  Review Complete On: 1/24/2018 By: Sanjiv Pandey Severity Noted Reaction Type Reactions Penicillins High 09/29/2010    Rash, Itching, Swelling Of throat Prednisone High 11/06/2017    Anaphylaxis Bactrim [Sulfamethoprim Ds]  09/29/2010    Rash Biaxin [Clarithromycin]  09/29/2010    Rash Ciprofloxacin  02/27/2014    Rash Ivp Dye [Fd And C Blue No.1]  09/29/2010    Rash Keflex [Cephalexin]  09/29/2010    Rash Nickel  11/06/2017    Contact Dermatitis Nsaids (Non-steroidal Anti-inflammatory Drug)  09/29/2010    Rash Simvastatin  10/30/2014    Myalgia Tetracycline  09/29/2010    Rash Current Immunizations  Reviewed on 11/13/2017 Name Date Influenza High Dose Vaccine PF 11/9/2017 Influenza Vaccine 10/15/2015, 9/27/2014, 11/20/2013, 10/29/2009 12:00 AM, 10/24/2008 12:00 AM  
 Influenza Vaccine PF 10/14/2016 12:00 AM  
 Influenza Vaccine Split 11/9/2011, 10/26/2010 Pneumococcal Conjugate (PCV-13) 7/24/2017 12:00 AM  
 Tdap 4/20/2011 12:00 AM  
  
 Not reviewed this visit You Were Diagnosed With   
  
 Codes Comments Mixed hyperlipidemia    -  Primary ICD-10-CM: B41.5 ICD-9-CM: 272.2  Coronary artery disease involving native coronary artery of native heart without angina pectoris     ICD-10-CM: I25.10 ICD-9-CM: 414.01 Chronic obstructive pulmonary disease, unspecified COPD type ([facility]     ICD-10-CM: J44.9 ICD-9-CM: 387 PVD (peripheral vascular disease) ([facility].)     ICD-10-CM: I73.9 ICD-9-CM: 443.9 Tobacco abuse     ICD-10-CM: Z72.0 ICD-9-CM: 305.1 Anxiety     ICD-10-CM: F41.9 ICD-9-CM: 300.00   
 DDD (degenerative disc disease), lumbar     ICD-10-CM: M51.36 
ICD-9-CM: 722.52 Hypoglycemia     ICD-10-CM: E16.2 ICD-9-CM: 251.2 Vitals BP Pulse Temp Resp Height(growth percentile) Weight(growth percentile) 130/68 (BP 1 Location: Left arm, BP Patient Position: Sitting) 87 98.2 °F (36.8 °C) (Oral) 18 5' 6\" (1.676 m) 177 lb (80.3 kg) SpO2 BMI OB Status Smoking Status 94% 28.57 kg/m2 Postmenopausal Current Every Day Smoker Vitals History BMI and BSA Data Body Mass Index Body Surface Area 28.57 kg/m 2 1.93 m 2 Preferred Pharmacy Pharmacy Name Phone [facility], [address] AT [facility] OF  3 & [facility] Romulo March [phone] Your Updated Medication List  
  
   
This list is accurate as of: 1/24/18 12:06 PM.  Always use your most recent med list.  
  
  
  
  
 ACCU-CHEK COMPACT PLUS TEST Strp Generic drug:  Blood Sugar Diagnostic, Drum USE TO TEST FOR BLOOD SUGAR TWICE DAILY ALPRAZolam 0.5 mg tablet Commonly known as:  Keshia Brisk Take 1 Tab by mouth three (3) times daily as needed for Anxiety. Max Daily Amount: 1.5 mg.  
  
 Blood-Glucose Meter monitoring kit For twice a day testing  
  
 gabapentin 600 mg tablet Commonly known as:  NEURONTIN Take  by mouth three (3) times daily. Lancets Misc Commonly known as:  ACCU-CHEK SOFTCLIX LANCETS Use as dir  
  
 levalbuterol tartrate 45 mcg/actuation inhaler Commonly known as:  Cayla Balta Take 1-2 Puffs by inhalation every four (4) hours as needed for Wheezing. PERCOCET  mg per tablet Generic drug:  oxyCODONE-acetaminophen Take 1 Tab by mouth three (3) times daily. raNITIdine 150 mg tablet Commonly known as:  ZANTAC TAKE 1 TABLET BY MOUTH TWICE A DAY FOR ACID REFLUX  
  
 tiZANidine 2 mg tablet Commonly known as:  Hildy Chandana ZyrTEC 10 mg tablet Generic drug:  cetirizine Take  by mouth daily. Prescriptions Printed Refills ALPRAZolam (XANAX) 0.5 mg tablet 5 Sig: Take 1 Tab by mouth three (3) times daily as needed for Anxiety. Max Daily Amount: 1.5 mg.  
 Class: Print Route: Oral  
  
We Performed the Following GLUCOSE TOLERANCE (3 SP BLOOD) [id number] CPT(R)] REFERRAL TO NEUROSURGERY [JER73 Custom] Referral Information Referral ID Referred By Referred To  
  
 [id number] [place] [address] Bayron Cristachristina Phone: [phone] Fax: [phone] Visits Status Start Date End Date 1 New Request 1/24/18 1/24/19 If your referral has a status of pending review or denied, additional information will be sent to support the outcome of this decision. Patient Instructions Hypoglycemia: Care Instructions Your Care Instructions Hypoglycemia means that your blood sugar is low and your body is not getting enough fuel. Some people get low blood sugar from not eating often enough. Some medicines to treat diabetes can cause low blood sugar. People who have had surgery on their stomachs or intestines may get hypoglycemia. Problems with the pancreas, kidneys, or liver also can cause low blood sugar. A snack or drink with sugar in it will raise your blood sugar and should ease your symptoms right away. Your doctor may recommend that you change or stop your medicines until you can get your blood sugar levels under control.  In the long run, you may need to change your diet and eating habits so that you get enough fuel for your body throughout the day. Follow-up care is a key part of your treatment and safety. Be sure to make and go to all appointments, and call your doctor if you are having problems. It's also a good idea to know your test results and keep a list of the medicines you take. How can you care for yourself at home? · Learn to recognize the early signs of low blood sugar. Signs include: 
¨ Nausea. ¨ Hunger. ¨ Feeling nervous, irritable, or shaky. ¨ Cold, clammy, wet skin. ¨ Sweating (when you are not exercising). ¨ A fast heartbeat. ¨ Numbness or tingling of the fingertips or lips. · If you feel an episode of low blood sugar coming on, drink fruit juice or sugared (not diet) soda, or eat sugar in the form of candy, cubes, or tablets. [employer] are another American [employer]. · Eat small, frequent meals so that you do not get too hungry between meals. · Balance extra exercise with eating more. · Keep a written record of your low blood sugar episodes, including when you last ate and what you ate, so that you can learn what causes your blood sugar to drop. · Make sure your family, friends, and coworkers know the symptoms of low blood sugar and know what to do to get your sugar level up. · Wear medical alert jewelry that lists your condition. You can buy this at most drugstores. When should you call for help? Call 911 anytime you think you may need emergency care. For example, call if: 
? · You passed out (lost consciousness). ? · You are confused or cannot think clearly. ? · Your blood sugar is very high or very low. ? Watch closely for changes in your health, and be sure to contact your doctor if: 
? · Your blood sugar stays outside the level your doctor set for you. ? · You have any problems. Where can you learn more? Go to http://dean-berny.info/. Enter J695 in the search box to learn more about \"Hypoglycemia: Care Instructions. \" Current as of: March 13, 2017 Content Version: 11.4 © 4133-0205 Healthwise, Netcontinuum. Care instructions adapted under license by Mantex (which disclaims liability or warranty for this information). If you have questions about a medical condition or this instruction, always ask your healthcare professional. [address] any warranty or liability for your use of this information. Introducing [facility] & HEALTH SERVICES! [address] introduces Edvert patient portal. Now you can access parts of your medical record, email your doctor's office, and request medication refills online. 1. In your internet browser, go to [email or web address] 2. Click on the First Time User? Click Here link in the Sign In box. You will see the New Member Sign Up page. 3. Enter your Edvert Access Code exactly as it appears below. You will not need to use this code after youve completed the sign-up process. If you do not sign up before the expiration date, you must request a new code. · Edvert Access Code: [email or web address] Expires: 4/24/2018 12:06 PM 
 
4. Enter the last four digits of your Social Security Number (xxxx) and Date of Birth (mm/dd/yyyy) as indicated and click Submit. You will be taken to the next sign-up page. 5. Create a Edvert ID. This will be your Edvert login ID and cannot be changed, so think of one that is secure and easy to remember. 6. Create a Edvert password. You can change your password at any time. 7. Enter your Password Reset Question and Answer. This can be used at a later time if you forget your password. 8. Enter your e-mail address. You will receive e-mail notification when new information is available in [address] 9. Click Sign Up. You can now view and download portions of your medical record. 10. Click the Download Summary menu link to download a portable copy of your medical information. If you have questions, please visit the Frequently Asked Questions section of the R[employer]te[employer] website. Remember, [employer] is NOT to be used for urgent needs. For medical emergencies, dial 911. Now available from your iPhone and Android! Please provide this summary of care documentation to your next provider. Your primary care clinician is listed as Ko Many. If you have any questions after today's visit, please call [phone].

## 2018-01-24 NOTE — PATIENT INSTRUCTIONS
Hypoglycemia: Care Instructions  Your Care Instructions    Hypoglycemia means that your blood sugar is low and your body is not getting enough fuel. Some people get low blood sugar from not eating often enough. Some medicines to treat diabetes can cause low blood sugar. People who have had surgery on their stomachs or intestines may get hypoglycemia. Problems with the pancreas, kidneys, or liver also can cause low blood sugar. A snack or drink with sugar in it will raise your blood sugar and should ease your symptoms right away. Your doctor may recommend that you change or stop your medicines until you can get your blood sugar levels under control. In the long run, you may need to change your diet and eating habits so that you get enough fuel for your body throughout the day. Follow-up care is a key part of your treatment and safety. Be sure to make and go to all appointments, and call your doctor if you are having problems. It's also a good idea to know your test results and keep a list of the medicines you take. How can you care for yourself at home? · Learn to recognize the early signs of low blood sugar. Signs include:  ¨ Nausea. ¨ Hunger. ¨ Feeling nervous, irritable, or shaky. ¨ Cold, clammy, wet skin. ¨ Sweating (when you are not exercising). ¨ A fast heartbeat. ¨ Numbness or tingling of the fingertips or lips. · If you feel an episode of low blood sugar coming on, drink fruit juice or sugared (not diet) soda, or eat sugar in the form of candy, cubes, or tablets. [employer] are another American [employer]. · Eat small, frequent meals so that you do not get too hungry between meals. · Balance extra exercise with eating more. · Keep a written record of your low blood sugar episodes, including when you last ate and what you ate, so that you can learn what causes your blood sugar to drop.   · Make sure your family, friends, and coworkers know the symptoms of low blood sugar and know what to do to get your sugar level up. · Wear medical alert jewelry that lists your condition. You can buy this at most drugstores. When should you call for help? Call 911 anytime you think you may need emergency care. For example, call if:  ? · You passed out (lost consciousness). ? · You are confused or cannot think clearly. ? · Your blood sugar is very high or very low. ? Watch closely for changes in your health, and be sure to contact your doctor if:  ? · Your blood sugar stays outside the level your doctor set for you. ? · You have any problems. Where can you learn more? Go to http://dean-berny.info/. Enter D853 in the search box to learn more about \"Hypoglycemia: Care Instructions. \"  Current as of: March 13, 2017  Content Version: 11.4  © 4486-6370 Healthwise, Incorporated. Care instructions adapted under license by AdStage (which disclaims liability or warranty for this information). If you have questions about a medical condition or this instruction, always ask your healthcare professional. [place] [address] any warranty or liability for your use of this information.

## 2018-01-24 NOTE — PROGRESS NOTES
Chief Complaint   Patient presents with    Follow-up     6 month f/u     1. Have you been to the ER, urgent care clinic since your last visit? Hospitalized since your last visit? No    2. Have you seen or consulted any other health care providers outside of the [address] [place] [address] since your last visit? Include any pap smears or colon screening.  No

## 2018-01-30 ENCOUNTER — TELEPHONE (OUTPATIENT)
Dept: FAMILY MEDICINE CLINIC | Age: 68
End: 2018-01-30

## 2018-01-30 DIAGNOSIS — I25.10 CORONARY ARTERY DISEASE INVOLVING NATIVE CORONARY ARTERY OF NATIVE HEART WITHOUT ANGINA PECTORIS: ICD-10-CM

## 2018-01-30 DIAGNOSIS — R73.02 IGT (IMPAIRED GLUCOSE TOLERANCE): ICD-10-CM

## 2018-01-30 DIAGNOSIS — E78.2 MIXED HYPERLIPIDEMIA: ICD-10-CM

## 2018-01-30 DIAGNOSIS — M25.50 POLYARTHRALGIA: Primary | ICD-10-CM

## 2018-01-30 NOTE — TELEPHONE ENCOUNTER
[phone] (home)  spoke to Mr Anup Davalos has appointment 7/24/2018 for 6 months follow up and wants to do labs prior to appointment needs lab order mail to patient's home address per Mr Anup Davalos wants to add TSH and Rheumatoid Arthritis for her labs

## 2018-03-29 ENCOUNTER — OFFICE VISIT (OUTPATIENT)
Dept: FAMILY MEDICINE CLINIC | Age: 68
End: 2018-03-29

## 2018-03-29 VITALS
BODY MASS INDEX: 28.77 KG/M2 | SYSTOLIC BLOOD PRESSURE: 126 MMHG | HEART RATE: 73 BPM | TEMPERATURE: 98.1 F | HEIGHT: 66 IN | DIASTOLIC BLOOD PRESSURE: 66 MMHG | RESPIRATION RATE: 17 BRPM | WEIGHT: 179 LBS | OXYGEN SATURATION: 95 %

## 2018-03-29 DIAGNOSIS — J20.9 ACUTE BRONCHITIS, UNSPECIFIED ORGANISM: Primary | ICD-10-CM

## 2018-03-29 DIAGNOSIS — R73.02 IGT (IMPAIRED GLUCOSE TOLERANCE): ICD-10-CM

## 2018-03-29 RX ORDER — AZITHROMYCIN 250 MG/1
TABLET, FILM COATED ORAL
Qty: 6 TAB | Refills: 0 | Status: SHIPPED | OUTPATIENT
Start: 2018-03-29 | End: 2018-04-03

## 2018-03-29 RX ORDER — OXYCODONE HYDROCHLORIDE 10 MG/1
10 TABLET ORAL 3 TIMES DAILY
Refills: 0 | COMMUNITY
Start: 2018-03-04 | End: 2020-11-11 | Stop reason: SDUPTHER

## 2018-03-29 RX ORDER — LANCETS
EACH MISCELLANEOUS
Qty: 100 EACH | Refills: 3 | Status: SHIPPED | OUTPATIENT
Start: 2018-03-29 | End: 2020-11-03

## 2018-03-29 RX ORDER — GABAPENTIN 300 MG/1
300 CAPSULE ORAL 3 TIMES DAILY
COMMUNITY
Start: 2018-03-28 | End: 2020-11-03

## 2018-03-29 NOTE — MR AVS SNAPSHOT
[address] 
 
 
 [address] [place] [address] 
[phone] Patient: Ursula Donaldson MRN: [id number] Visit Information Date & Time Provider Department Dept. Phone Encounter #  
 3/29/2018  3:45 PM Lacho Villanueva  [facility] [phone] [id number] Follow-up Instructions Return if symptoms worsen or fail to improve. Your Appointments 7/24/2018 10:45 AM  
ROUTINE CARE with Lacho Villanueva  [facility] ([address]) Appt Note: 6 month follow up  
 [address] [address]  
[phone]  
  
   
 [address] [address] Upcoming Health Maintenance Date Due  
 GLAUCOMA SCREENING Q2Y 2/3/2017 BREAST CANCER SCRN MAMMOGRAM 3/31/2018 MEDICARE YEARLY EXAM 7/20/2018 Pneumococcal 65+ Low/Medium Risk (2 of 2 - PPSV23) 7/24/2018 FOBT Q 1 YEAR AGE 50-75 8/8/2018 DTaP/Tdap/Td series (2 - Td) 4/20/2021 Allergies as of 3/29/2018  Review Complete On: 3/29/2018 By: Katharine Grace LPN Severity Noted Reaction Type Reactions Penicillins High 09/29/2010    Rash, Itching, Swelling Of throat Prednisone High 11/06/2017    Anaphylaxis Bactrim [Sulfamethoprim Ds]  09/29/2010    Rash Biaxin [Clarithromycin]  09/29/2010    Rash Ciprofloxacin  02/27/2014    Rash Ivp Dye [Fd And C Blue No.1]  09/29/2010    Rash Keflex [Cephalexin]  09/29/2010    Rash Nickel  11/06/2017    Contact Dermatitis Nsaids (Non-steroidal Anti-inflammatory Drug)  09/29/2010    Rash Simvastatin  10/30/2014    Myalgia Tetracycline  09/29/2010    Rash Current Immunizations  Reviewed on 11/13/2017 Name Date Influenza High Dose Vaccine PF 11/9/2017  Influenza Vaccine 10/15/2015, 9/27/2014, 11/20/2013, 10/29/2009 12:00 AM, 10/24/2008 12:00 AM  
 Influenza Vaccine PF 10/14/2016 12:00 AM  
 Influenza Vaccine Split 11/9/2011, 10/26/2010 Pneumococcal Conjugate (PCV-13) 7/24/2017 12:00 AM  
 Tdap 4/20/2011 12:00 AM  
  
 Not reviewed this visit You Were Diagnosed With   
  
 Codes Comments Acute bronchitis, unspecified organism    -  Primary ICD-10-CM: J20.9 ICD-9-CM: 466.0 Vitals BP Pulse Temp Resp Height(growth percentile) Weight(growth percentile) 126/66 (BP 1 Location: Right arm, BP Patient Position: Sitting) 73 98.1 °F (36.7 °C) (Oral) 17 5' 6\" (1.676 m) 179 lb (81.2 kg) SpO2 BMI OB Status Smoking Status 95% 28.89 kg/m2 Postmenopausal Current Every Day Smoker Vitals History BMI and BSA Data Body Mass Index Body Surface Area  
 28.89 kg/m 2 1.94 m 2 Preferred Pharmacy Pharmacy Name Phone [address], [address] AT [facility] OF  [facility] & [facility]. Tashi Singh [phone] Your Updated Medication List  
  
   
This list is accurate as of 3/29/18  4:19 PM.  Always use your most recent med list.  
  
  
  
  
 ACCU-CHEK COMPACT PLUS TEST Strp Generic drug:  Blood Sugar Diagnostic, Drum USE TO TEST FOR BLOOD SUGAR TWICE DAILY ALPRAZolam 0.5 mg tablet Commonly known as:  Jessica Hams Take 1 Tab by mouth three (3) times daily as needed for Anxiety. Max Daily Amount: 1.5 mg.  
  
 azithromycin 250 mg tablet Commonly known as:  Jasbir Bryson Take 2 tablets today, then take 1 tablet daily Blood-Glucose Meter monitoring kit For twice a day testing * gabapentin 600 mg tablet Commonly known as:  NEURONTIN Take  by mouth three (3) times daily. * gabapentin 300 mg capsule Commonly known as:  NEURONTIN Lancets Misc Commonly known as:  ACCU-CHEK SOFTCLIX LANCETS Use as dir  
  
 levalbuterol tartrate 45 mcg/actuation inhaler Commonly known as:  Maile Nicole Take 1-2 Puffs by inhalation every four (4) hours as needed for Wheezing. oxyCODONE IR 10 mg Tab immediate release tablet Commonly known as:  ROXICODONE  
TK 1 TO 2 TS PO TID PRN P  
  
 PERCOCET  mg per tablet Generic drug:  oxyCODONE-acetaminophen Take 1 Tab by mouth three (3) times daily. raNITIdine 150 mg tablet Commonly known as:  ZANTAC TAKE 1 TABLET BY MOUTH TWICE A DAY FOR ACID REFLUX  
  
 tiZANidine 2 mg tablet Commonly known as:  Evins Bensilvia ZyrTEC 10 mg tablet Generic drug:  cetirizine Take  by mouth daily. * Notice: This list has 2 medication(s) that are the same as other medications prescribed for you. Read the directions carefully, and ask your doctor or other care provider to review them with you. Prescriptions Sent to Pharmacy Refills  
 azithromycin (ZITHROMAX) 250 mg tablet 0 Sig: Take 2 tablets today, then take 1 tablet daily Class: Normal  
 Pharmacy: [facility] [address] [facility] #: [phone] Follow-up Instructions Return if symptoms worsen or fail to improve. Patient Instructions Bronchitis: Care Instructions Your Care Instructions Bronchitis is inflammation of the bronchial tubes, which carry air to the lungs. The tubes swell and produce mucus, or phlegm. The mucus and inflamed bronchial tubes make you cough. You may have trouble breathing. Most cases of bronchitis are caused by viruses like those that cause colds. Antibiotics usually do not help and they may be harmful. Bronchitis usually develops rapidly and lasts about 2 to 3 weeks in otherwise healthy people. Follow-up care is a key part of your treatment and safety. Be sure to make and go to all appointments, and call your doctor if you are having problems. It's also a good idea to know your test results and keep a list of the medicines you take. How can you care for yourself at home? · Take all medicines exactly as prescribed. Call your doctor if you think you are having a problem with your medicine. · Get some extra rest. 
· Take an over-the-counter pain medicine, such as acetaminophen (Tylenol), ibuprofen (Advil, Motrin), or naproxen (Aleve) to reduce fever and relieve body aches. Read and follow all instructions on the label. · Do not take two or more pain medicines at the same time unless the doctor told you to. Many pain medicines have acetaminophen, which is Tylenol. Too much acetaminophen (Tylenol) can be harmful. · Take an over-the-counter cough medicine that contains dextromethorphan to help quiet a dry, hacking cough so that you can sleep. Avoid cough medicines that have more than one active ingredient. Read and follow all instructions on the label. · Breathe moist air from a humidifier, hot shower, or sink filled with hot water. The heat and moisture will thin mucus so you can cough it out. · Do not smoke. Smoking can make bronchitis worse. If you need help quitting, talk to your doctor about stop-smoking programs and medicines. These can increase your chances of quitting for good. When should you call for help? Call 911 anytime you think you may need emergency care. For example, call if: 
? · You have severe trouble breathing. ?Call your doctor now or seek immediate medical care if: 
? · You have new or worse trouble breathing. ? · You cough up dark brown or bloody mucus (sputum). ? · You have a new or higher fever. ? · You have a new rash. ? Watch closely for changes in your health, and be sure to contact your doctor if: 
? · You cough more deeply or more often, especially if you notice more mucus or a change in the color of your mucus. ? · You are not getting better as expected. Where can you learn more? Go to http://dean-berny.info/. Enter H333 in the search box to learn more about \"Bronchitis: Care Instructions. \" 
 Current as of: May 12, 2017 Content Version: 11.4 © 8842-2783 Healthwise, SplashMaps. Care instructions adapted under license by Image Insight (which disclaims liability or warranty for this information). If you have questions about a medical condition or this instruction, always ask your healthcare professional. [address] any warranty or liability for your use of this information. Introducing [facility] & HEALTH SERVICES! [facility] introduces Right Hemisphere patient portal. Now you can access parts of your medical record, email your doctor's office, and request medication refills online. 1. In your internet browser, go to [email or web address] 2. Click on the First Time User? Click Here link in the Sign In box. You will see the New Member Sign Up page. 3. Enter your Right Hemisphere Access Code exactly as it appears below. You will not need to use this code after youve completed the sign-up process. If you do not sign up before the expiration date, you must request a new code. · Right Hemisphere Access Code: [email or web address] Expires: 4/24/2018  1:06 PM 
 
4. Enter the last four digits of your Social Security Number (xxxx) and Date of Birth (mm/dd/yyyy) as indicated and click Submit. You will be taken to the next sign-up page. 5. Create a Right Hemisphere ID. This will be your Right Hemisphere login ID and cannot be changed, so think of one that is secure and easy to remember. 6. Create a Right Hemisphere password. You can change your password at any time. 7. Enter your Password Reset Question and Answer. This can be used at a later time if you forget your password. 8. Enter your e-mail address. You will receive e-mail notification when new information is available in [address] 9. Click Sign Up. You can now view and download portions of your medical record. 10. Click the Download Summary menu link to download a portable copy of your medical information. If you have questions, please visit the Frequently Asked Questions section of the [employer] website. Remember, [employer] is NOT to be used for urgent needs. For medical emergencies, dial 911. Now available from your iPhone and Android! Please provide this summary of care documentation to your next provider. Your primary care clinician is listed as Demetrio Eden. If you have any questions after today's visit, please call [phone].

## 2018-03-29 NOTE — PROGRESS NOTES
Chief Complaint   Patient presents with    Nasal Congestion     nasal and head congestion- started 5 days ago    Cough     coughing up up yellow phlegm- started 5 days ago     1. Have you been to the ER, urgent care clinic since your last visit? Hospitalized since your last visit? No    2. Have you seen or consulted any other health care providers outside of the [address] [place] [address] since your last visit? Include any pap smears or colon screening.  No

## 2018-03-29 NOTE — PROGRESS NOTES
[address] [facility] Note    Bhavya Abbott is a 79 y.o. female who was seen in clinic today (3/29/2018). Subjective:  Upper Respiratory Infection  Patient complains of symptoms of a URI. Symptoms include congestion and cough. Onset of symptoms was 5 days ago, gradually worsening since that time. She also c/o congestion, cough described as productive of green/yellow sputum, post nasal drip and sinus pressure for the past 5 days . She is drinking plenty of fluids. . Evaluation to date: none. Treatment to date: none. Prior to Admission medications    Medication Sig Start Date End Date Taking? Authorizing Provider   gabapentin (NEURONTIN) 300 mg capsule  3/28/18  Yes Historical Provider   oxyCODONE IR (ROXICODONE) 10 mg tab immediate release tablet TK 1 TO 2 TS PO TID PRN P 3/4/18  Yes Historical Provider   azithromycin (ZITHROMAX) 250 mg tablet Take 2 tablets today, then take 1 tablet daily 3/29/18 4/3/18 Yes Orion Moody NP   Blood Sugar Diagnostic, Drum (ACCU-CHEK COMPACT PLUS TEST) strp For glucose testing once daily as needed. R73.02 3/29/18  Yes Orion Moody NP   Blood-Glucose Meter, Drum-type (ACCU-CHEK COMPACT PLUS CARE) kit For glucose testing once daily as needed. R73.02 3/29/18  Yes Orion Moody NP   Lancets (ACCU-CHEK MULTICLIX LANCET) misc For glucose testing once daily as needed. R73.02 3/29/18  Yes Orion Moody NP   ALPRAZolam Azeemlorne Malone) 0.5 mg tablet Take 1 Tab by mouth three (3) times daily as needed for Anxiety. Max Daily Amount: 1.5 mg. 1/24/18  Yes Orion Moody NP   ACCU-CHEK COMPACT PLUS TEST strp USE TO TEST FOR BLOOD SUGAR TWICE DAILY 7/10/17  Yes Orion Moody NP   tiZANidine (ZANAFLEX) 2 mg tablet  2/17/16  Yes Historical Provider   levalbuterol tartrate (XOPENEX) 45 mcg/actuation inhaler Take 1-2 Puffs by inhalation every four (4) hours as needed for Wheezing.  10/21/15  Yes Orion Moody NP   gabapentin (NEURONTIN) 600 mg tablet Take  by mouth three (3) times daily. Yes Historical Provider   ranitidine (ZANTAC) 150 mg tablet TAKE 1 TABLET BY MOUTH TWICE A DAY FOR ACID REFLUX 10/13/14  Yes Adam Martino MD   Blood-Glucose Meter monitoring kit For twice a day testing 2/27/14  Yes Iwona Tavares NP   cetirizine (ZYRTEC) 10 mg tablet Take  by mouth daily. Yes Historical Provider   oxycodone-acetaminophen (PERCOCET)  mg per tablet Take 1 Tab by mouth three (3) times daily. 9/29/10   Historical Provider          Allergies   Allergen Reactions    Penicillins Rash, Itching and Swelling     Of throat    Prednisone Anaphylaxis    Bactrim [Sulfamethoprim Ds] Rash    Biaxin [Clarithromycin] Rash    Ciprofloxacin Rash    Ivp Dye [Fd And C Blue No.1] Rash    Keflex [Cephalexin] Rash    Nickel Contact Dermatitis    Nsaids (Non-Steroidal Anti-Inflammatory Drug) Rash    Simvastatin Myalgia    Tetracycline Rash         ROS  See HPI    Objective:   Physical Exam   Constitutional: She is oriented to person, place, and time. She appears well-developed and well-nourished. No distress. HENT:   Right Ear: Tympanic membrane and ear canal normal.   Left Ear: Tympanic membrane and ear canal normal.   Nose: Mucosal edema present. Right sinus exhibits no maxillary sinus tenderness and no frontal sinus tenderness. Left sinus exhibits no maxillary sinus tenderness and no frontal sinus tenderness. Mouth/Throat: Oropharynx is clear and moist.   Cardiovascular: Normal rate, regular rhythm and normal heart sounds. No murmur heard. Pulmonary/Chest: Effort normal. She has no decreased breath sounds. She has no wheezes. She has rhonchi in the right middle field. Lymphadenopathy:     She has no cervical adenopathy. Neurological: She is alert and oriented to person, place, and time. Psychiatric: She has a normal mood and affect. Her behavior is normal.   Nursing note and vitals reviewed.         Visit Vitals    /66 (BP 1 Location: Right arm, BP Patient Position: Sitting)    Pulse 73    Temp 98.1 °F (36.7 °C) (Oral)    Resp 17    Ht 5' 6\" (1.676 m)    Wt 179 lb (81.2 kg)    SpO2 95%    BMI 28.89 kg/m2       Assessment & Plan:  Diagnoses and all orders for this visit:    1. Acute bronchitis, unspecified organism  Mucinex PRN. -     Cover with azithromycin (ZITHROMAX) 250 mg tablet; Take 2 tablets today, then take 1 tablet daily    2. IGT (impaired glucose tolerance)  -     Blood Sugar Diagnostic, Drum (ACCU-CHEK COMPACT PLUS TEST) strp; For glucose testing once daily as needed. R73.02  -     Blood-Glucose Meter, Drum-type (ACCU-CHEK COMPACT PLUS CARE) kit; For glucose testing once daily as needed. R73.02  -     Lancets (ACCU-CHEK MULTICLIX LANCET) misc; For glucose testing once daily as needed. R73.02      I have discussed the diagnosis with the patient and the intended plan as seen in the above orders. The patient has received an after-visit summary along with patient information handout. I have discussed medication side effects and warnings with the patient as well. Follow-up Disposition:  Return if symptoms worsen or fail to improve.         Mian Burgess NP

## 2018-03-29 NOTE — PATIENT INSTRUCTIONS
Bronchitis: Care Instructions  Your Care Instructions    Bronchitis is inflammation of the bronchial tubes, which carry air to the lungs. The tubes swell and produce mucus, or phlegm. The mucus and inflamed bronchial tubes make you cough. You may have trouble breathing. Most cases of bronchitis are caused by viruses like those that cause colds. Antibiotics usually do not help and they may be harmful. Bronchitis usually develops rapidly and lasts about 2 to 3 weeks in otherwise healthy people. Follow-up care is a key part of your treatment and safety. Be sure to make and go to all appointments, and call your doctor if you are having problems. It's also a good idea to know your test results and keep a list of the medicines you take. How can you care for yourself at home? · Take all medicines exactly as prescribed. Call your doctor if you think you are having a problem with your medicine. · Get some extra rest.  · Take an over-the-counter pain medicine, such as acetaminophen (Tylenol), ibuprofen (Advil, Motrin), or naproxen (Aleve) to reduce fever and relieve body aches. Read and follow all instructions on the label. · Do not take two or more pain medicines at the same time unless the doctor told you to. Many pain medicines have acetaminophen, which is Tylenol. Too much acetaminophen (Tylenol) can be harmful. · Take an over-the-counter cough medicine that contains dextromethorphan to help quiet a dry, hacking cough so that you can sleep. Avoid cough medicines that have more than one active ingredient. Read and follow all instructions on the label. · Breathe moist air from a humidifier, hot shower, or sink filled with hot water. The heat and moisture will thin mucus so you can cough it out. · Do not smoke. Smoking can make bronchitis worse. If you need help quitting, talk to your doctor about stop-smoking programs and medicines. These can increase your chances of quitting for good.   When should you call for help? Call 911 anytime you think you may need emergency care. For example, call if:  ? · You have severe trouble breathing. ?Call your doctor now or seek immediate medical care if:  ? · You have new or worse trouble breathing. ? · You cough up dark brown or bloody mucus (sputum). ? · You have a new or higher fever. ? · You have a new rash. ? Watch closely for changes in your health, and be sure to contact your doctor if:  ? · You cough more deeply or more often, especially if you notice more mucus or a change in the color of your mucus. ? · You are not getting better as expected. Where can you learn more? Go to http://dean-berny.info/. Enter H333 in the search box to learn more about \"Bronchitis: Care Instructions. \"  Current as of: May 12, 2017  Content Version: 11.4  © 0407-4228 [employer]. Care instructions adapted under license by [employer] (which disclaims liability or warranty for this information). If you have questions about a medical condition or this instruction, always ask your healthcare professional. [address] any warranty or liability for your use of this information.

## 2018-07-20 ENCOUNTER — HOSPITAL ENCOUNTER (OUTPATIENT)
Dept: LAB | Age: 68
Discharge: HOME | End: 2018-07-20
Payer: MEDICARE

## 2018-07-20 PROCEDURE — 80053 COMPREHEN METABOLIC PANEL: CPT

## 2018-07-20 PROCEDURE — 86140 C-REACTIVE PROTEIN: CPT

## 2018-07-20 PROCEDURE — 85651 RBC SED RATE NONAUTOMATED: CPT

## 2018-07-20 PROCEDURE — 36415 COLL VENOUS BLD VENIPUNCTURE: CPT

## 2018-07-20 PROCEDURE — 86200 CCP ANTIBODY: CPT

## 2018-07-20 PROCEDURE — 86431 RHEUMATOID FACTOR QUANT: CPT

## 2018-07-20 PROCEDURE — 80061 LIPID PANEL: CPT

## 2018-07-20 PROCEDURE — 83036 HEMOGLOBIN GLYCOSYLATED A1C: CPT

## 2018-07-20 PROCEDURE — 85025 COMPLETE CBC W/AUTO DIFF WBC: CPT

## 2018-07-20 PROCEDURE — 84443 ASSAY THYROID STIM HORMONE: CPT

## 2018-07-22 LAB
ALBUMIN SERPL-MCNC: 4.1 G/DL (ref 3.6–4.8)
ALBUMIN/GLOB SERPL: 1.3 {RATIO} (ref 1.2–2.2)
ALP SERPL-CCNC: 88 IU/L (ref 39–117)
ALT SERPL-CCNC: 20 IU/L (ref 0–32)
AST SERPL-CCNC: 21 IU/L (ref 0–40)
BASOPHILS # BLD AUTO: 0 X10E3/UL (ref 0–0.2)
BASOPHILS NFR BLD AUTO: 1 %
BILIRUB SERPL-MCNC: 0.4 MG/DL (ref 0–1.2)
BUN SERPL-MCNC: 10 MG/DL (ref 8–27)
BUN/CREAT SERPL: 15 (ref 12–28)
CALCIUM SERPL-MCNC: 9.6 MG/DL (ref 8.7–10.3)
CCP IGA+IGG SERPL IA-ACNC: 7 UNITS (ref 0–19)
CHLORIDE SERPL-SCNC: 104 MMOL/L (ref 96–106)
CHOLEST SERPL-MCNC: 224 MG/DL (ref 100–199)
CO2 SERPL-SCNC: 24 MMOL/L (ref 20–29)
CREAT SERPL-MCNC: 0.65 MG/DL (ref 0.57–1)
CRP SERPL-MCNC: 4 MG/L (ref 0–4.9)
EOSINOPHIL # BLD AUTO: 0.2 X10E3/UL (ref 0–0.4)
EOSINOPHIL NFR BLD AUTO: 5 %
ERYTHROCYTE [DISTWIDTH] IN BLOOD BY AUTOMATED COUNT: 14.1 % (ref 12.3–15.4)
ERYTHROCYTE [SEDIMENTATION RATE] IN BLOOD BY WESTERGREN METHOD: 3 MM/HR (ref 0–40)
GLOBULIN SER CALC-MCNC: 3.1 G/DL (ref 1.5–4.5)
GLUCOSE SERPL-MCNC: 101 MG/DL (ref 65–99)
HBA1C MFR BLD: 5.7 % (ref 4.8–5.6)
HCT VFR BLD AUTO: 44.9 % (ref 34–46.6)
HDLC SERPL-MCNC: 71 MG/DL
HGB BLD-MCNC: 15.1 G/DL (ref 11.1–15.9)
IMM GRANULOCYTES # BLD: 0 X10E3/UL (ref 0–0.1)
IMM GRANULOCYTES NFR BLD: 0 %
INTERPRETATION, 910389: NORMAL
LDLC SERPL CALC-MCNC: 136 MG/DL (ref 0–99)
LYMPHOCYTES # BLD AUTO: 1.4 X10E3/UL (ref 0.7–3.1)
LYMPHOCYTES NFR BLD AUTO: 26 %
MCH RBC QN AUTO: 29.2 PG (ref 26.6–33)
MCHC RBC AUTO-ENTMCNC: 33.6 G/DL (ref 31.5–35.7)
MCV RBC AUTO: 87 FL (ref 79–97)
MONOCYTES # BLD AUTO: 0.4 X10E3/UL (ref 0.1–0.9)
MONOCYTES NFR BLD AUTO: 8 %
NEUTROPHILS # BLD AUTO: 3.2 X10E3/UL (ref 1.4–7)
NEUTROPHILS NFR BLD AUTO: 60 %
PLATELET # BLD AUTO: 211 X10E3/UL (ref 150–379)
POTASSIUM SERPL-SCNC: 4.6 MMOL/L (ref 3.5–5.2)
PROT SERPL-MCNC: 7.2 G/DL (ref 6–8.5)
RBC # BLD AUTO: 5.18 X10E6/UL (ref 3.77–5.28)
RHEUMATOID FACT SERPL-ACNC: 10 IU/ML (ref 0–13.9)
SODIUM SERPL-SCNC: 143 MMOL/L (ref 134–144)
T4 FREE SERPL-MCNC: 1.27 NG/DL (ref 0.82–1.77)
TRIGL SERPL-MCNC: 86 MG/DL (ref 0–149)
TSH SERPL DL<=0.005 MIU/L-ACNC: 0.98 UIU/ML (ref 0.45–4.5)
VLDLC SERPL CALC-MCNC: 17 MG/DL (ref 5–40)
WBC # BLD AUTO: 5.3 X10E3/UL (ref 3.4–10.8)

## 2018-08-02 ENCOUNTER — OFFICE VISIT (OUTPATIENT)
Dept: FAMILY MEDICINE CLINIC | Age: 68
End: 2018-08-02

## 2018-08-02 VITALS
OXYGEN SATURATION: 94 % | RESPIRATION RATE: 17 BRPM | WEIGHT: 176 LBS | HEART RATE: 78 BPM | TEMPERATURE: 97.9 F | HEIGHT: 66 IN | BODY MASS INDEX: 28.28 KG/M2 | DIASTOLIC BLOOD PRESSURE: 68 MMHG | SYSTOLIC BLOOD PRESSURE: 126 MMHG

## 2018-08-02 DIAGNOSIS — E78.2 MIXED HYPERLIPIDEMIA: ICD-10-CM

## 2018-08-02 DIAGNOSIS — Z12.31 SCREENING MAMMOGRAM, ENCOUNTER FOR: ICD-10-CM

## 2018-08-02 DIAGNOSIS — M51.36 DDD (DEGENERATIVE DISC DISEASE), LUMBAR: ICD-10-CM

## 2018-08-02 DIAGNOSIS — I25.10 CORONARY ARTERY DISEASE INVOLVING NATIVE CORONARY ARTERY OF NATIVE HEART WITHOUT ANGINA PECTORIS: ICD-10-CM

## 2018-08-02 DIAGNOSIS — Z72.0 TOBACCO ABUSE: ICD-10-CM

## 2018-08-02 DIAGNOSIS — I73.9 PVD (PERIPHERAL VASCULAR DISEASE) (HCC): Primary | ICD-10-CM

## 2018-08-02 DIAGNOSIS — Z12.11 SCREEN FOR COLON CANCER: ICD-10-CM

## 2018-08-02 DIAGNOSIS — J44.9 CHRONIC OBSTRUCTIVE PULMONARY DISEASE, UNSPECIFIED COPD TYPE (HCC): ICD-10-CM

## 2018-08-02 DIAGNOSIS — R73.02 IGT (IMPAIRED GLUCOSE TOLERANCE): ICD-10-CM

## 2018-08-02 DIAGNOSIS — Z71.89 ACP (ADVANCE CARE PLANNING): ICD-10-CM

## 2018-08-02 DIAGNOSIS — Z00.00 ENCOUNTER FOR MEDICARE ANNUAL WELLNESS EXAM: ICD-10-CM

## 2018-08-02 DIAGNOSIS — F41.9 ANXIETY: ICD-10-CM

## 2018-08-02 RX ORDER — ALPRAZOLAM 0.5 MG/1
0.5 TABLET ORAL
Qty: 90 TAB | Refills: 5 | Status: SHIPPED | OUTPATIENT
Start: 2018-08-02 | End: 2019-01-25 | Stop reason: SDUPTHER

## 2018-08-02 NOTE — PROGRESS NOTES
Jeffrey Dumont is a 79 y.o. female and presents for annual Medicare Wellness Visit. Problem List: Reviewed with patient and discussed risk factors. Patient Active Problem List   Diagnosis Code    Tobacco abuse Z72.0    COPD (chronic obstructive pulmonary disease) ([facility] [address] J44.9    Hyperlipidemia E78.5    PVD (peripheral vascular disease) ([facility] [address] I73.9    CAD (coronary artery disease) I25.10    Anxiety F41.9    DDD (degenerative disc disease), lumbar M51.36       Current medical providers:  Patient Care Team:  Julio Cesar Mclaughlin NP as PCP - General (Nurse Practitioner)  Danny Feldman MD (General Surgery)  Kendra Haas MD (Orthopedic Surgery)  Liz Saxena MD (Endocrinology)  Manford Buerger, MD (Cardiology)    PSH: Reviewed with patient  Past Surgical History:   Procedure Laterality Date    HX BACK SURGERY  2015    lower    HX CARPAL TUNNEL RELEASE      HX CATARACT REMOVAL      both eyes    HX CYST REMOVAL      HX HEART CATHETERIZATION  2011    SHOULDER SURG [facility]      left shoulder        SH: Reviewed with patient  Social History   Substance Use Topics    Smoking status: Current Every Day Smoker     Packs/day: 1.00     Years: 30.00     Types: Cigarettes    Smokeless tobacco: Never Used    Alcohol use No       FH: Reviewed with patient  Family History   Problem Relation Age of Onset    Heart Disease Mother     Heart Disease Father     Lung Disease Father        Medications/Allergies: Reviewed with patient  Current Outpatient Prescriptions on File Prior to Visit   Medication Sig Dispense Refill    gabapentin (NEURONTIN) 300 mg capsule       oxyCODONE IR (ROXICODONE) 10 mg tab immediate release tablet TK 1 TO 2 TS PO TID PRN P  0    Blood Sugar Diagnostic, Drum (ACCU-CHEK COMPACT PLUS TEST) strp For glucose testing once daily as needed. R73.02 100 Strip 3    Blood-Glucose Meter, Drum-type (ACCU-CHEK COMPACT PLUS CARE) kit For glucose testing once daily as needed. R73.02 1 Kit 0    Lancets (ACCU-CHEK MULTICLIX LANCET) misc For glucose testing once daily as needed. R73.02 100 Each 3    ACCU-CHEK COMPACT PLUS TEST strp USE TO TEST FOR BLOOD SUGAR TWICE DAILY 102 Strip 5    tiZANidine (ZANAFLEX) 2 mg tablet   1    levalbuterol tartrate (XOPENEX) 45 mcg/actuation inhaler Take 1-2 Puffs by inhalation every four (4) hours as needed for Wheezing. 1 Inhaler 2    ranitidine (ZANTAC) 150 mg tablet TAKE 1 TABLET BY MOUTH TWICE A DAY FOR ACID REFLUX 180 tablet 3    Blood-Glucose Meter monitoring kit For twice a day testing 1 Kit 0    cetirizine (ZYRTEC) 10 mg tablet Take  by mouth daily.  gabapentin (NEURONTIN) 600 mg tablet Take  by mouth three (3) times daily.  oxycodone-acetaminophen (PERCOCET)  mg per tablet Take 1 Tab by mouth three (3) times daily. No current facility-administered medications on file prior to visit. Allergies   Allergen Reactions    Penicillins Rash, Itching and Swelling     Of throat    Prednisone Anaphylaxis    Bactrim [Sulfamethoprim Ds] Rash    Biaxin [Clarithromycin] Rash    Ciprofloxacin Rash    Ivp Dye [Fd And C Blue No.1] Rash    Keflex [Cephalexin] Rash    Nickel Contact Dermatitis    Nsaids (Non-Steroidal Anti-Inflammatory Drug) Rash    Simvastatin Myalgia    Tetracycline Rash       Objective:  Visit Vitals    /68 (BP 1 Location: Right arm, BP Patient Position: Sitting)    Pulse 78    Temp 97.9 °F (36.6 °C) (Oral)    Resp 17    Ht 5' 6\" (1.676 m)    Wt 176 lb (79.8 kg)    SpO2 94%    BMI 28.41 kg/m2    Body mass index is 28.41 kg/(m^2). Assessment of cognitive impairment: Alert and oriented x 3    Depression Screen:   PHQ over the last two weeks 8/2/2018   Little interest or pleasure in doing things Not at all   Feeling down, depressed, irritable, or hopeless Not at all   Total Score PHQ 2 0       Fall Risk Assessment:    Fall Risk Assessment, last 12 mths 8/2/2018   Able to walk?  Yes   Fall in past 12 months? No       Functional Ability:   Does the patient exhibit a steady gait? yes   How long did it take the patient to get up and walk from a sitting position? 1 sec   Is the patient self reliant?  (ie can do own laundry, meals, household chores)  yes     Does the patient handle his/her own medications? yes     Does the patient handle his/her own money? yes     Is the patients home safe (ie good lighting, handrails on stairs and bath, etc.)? yes     Did you notice or did patient express any hearing difficulties? no     Did you notice or did patient express any vision difficulties?   no     Were distance and reading eye charts used? no       Advance Care Planning:   Patient was offered the opportunity to discuss advance care planning:  yes     Does patient have an Advance Directive:  no   If no, did you provide information on Caring Connections? yes       Plan:      Orders Placed This Encounter    CONY MAMMO BI SCREENING INCL CAD    COLOGUARD TEST (FECAL DNA COLORECTAL CANCER SCREENING)    CBC W/O DIFF    METABOLIC PANEL, COMPREHENSIVE    LIPID PANEL    HEMOGLOBIN A1C W/O EAG    ONETOUCH ULTRA BLUE TEST STRIP strip    ALPRAZolam (XANAX) 0.5 mg tablet       Health Maintenance   Topic Date Due    Cologuard Q 3 Years  02/03/2002    GLAUCOMA SCREENING Q2Y  02/03/2017    BREAST CANCER SCRN MAMMOGRAM  03/31/2018    MEDICARE YEARLY EXAM  07/20/2018    Pneumococcal 65+ Low/Medium Risk (2 of 2 - PPSV23) 07/24/2018    Influenza Age 5 to Adult  08/01/2018    DTaP/Tdap/Td series (2 - Td) 04/20/2021    Hepatitis C Screening  Addressed    Bone Densitometry (Dexa) Screening  Completed    ZOSTER VACCINE AGE 60>  Addressed       *Patient verbalized understanding and agreement with the plan. A copy of the After Visit Summary with personalized health plan was given to the patient today.

## 2018-08-02 NOTE — PROGRESS NOTES
Chief Complaint   Patient presents with    Follow-up     6 month follow up for disease management     1. Have you been to the ER, urgent care clinic since your last visit? Hospitalized since your last visit? No    2. Have you seen or consulted any other health care providers outside of the [address] [place] [address] since your last visit? Include any pap smears or colon screening.  No

## 2018-08-02 NOTE — LETTER
8/2/2018 10:46 AM 
 
Ms. Grace Lazo [address] [place] [address] To Whom It May Concern: Grace Lazo is currently under the care of FELICITAS Dumont. She is treated for generalized anxiety disorder with Xanax 0.5 gm TID. She has failed multiple SSRIs due to adverse side effects. She has remian stable on this current dosing without complication for 15 years. If there are questions or concerns please have the patient contact our office. Sincerely, Charly Lam, NP

## 2018-08-02 NOTE — ACP (ADVANCE CARE PLANNING)
Advance Care Planning (ACP) Provider Conversation Snapshot    Date of ACP Conversation: 08/02/18  Persons included in Conversation:  patient and family  Length of ACP Conversation in minutes:  <16 minutes (Non-Billable)    Authorized Decision Maker (if patient is incapable of making informed decisions): This person is:   Healthcare Agent/Medical Power of  under Advance Directive          For Patients with Decision Making Capacity:   Values/Goals: Exploration of values, goals, and preferences if recovery is not expected, even with continued medical treatment in the event of:  Imminent death  \"In these circumstances, what matters most to you? \"  Care focused more on comfort or quality of life.     Conversation Outcomes / Follow-Up Plan:   Recommended completion of Advance Directive form after review of ACP materials and conversation with prospective healthcare agent

## 2018-08-02 NOTE — MR AVS SNAPSHOT
[address] 
[phone] Patient: Abbie Marquez MRN: [id number] TPD:7/0/2834 Visit Information Date & Time Provider Department Dept. Phone Encounter #  
 8/2/2018 10:15 AM Soraya Cerda  W [facility] [phone] [id number] Follow-up Instructions Return in about 6 months (around 2/2/2019) for disease management. Upcoming Health Maintenance Date Due Cologuard Q 3 Years 2/3/2002 GLAUCOMA SCREENING Q2Y 2/3/2017 BREAST CANCER SCRN MAMMOGRAM 3/31/2018 MEDICARE YEARLY EXAM 7/20/2018 Pneumococcal 65+ Low/Medium Risk (2 of 2 - PPSV23) 7/24/2018 Influenza Age 5 to Adult 8/1/2018 DTaP/Tdap/Td series (2 - Td) 4/20/2021 Allergies as of 8/2/2018  Review Complete On: 8/2/2018 By: Eun Wilson LPN Severity Noted Reaction Type Reactions Penicillins High 09/29/2010    Rash, Itching, Swelling Of throat Prednisone High 11/06/2017    Anaphylaxis Bactrim [Sulfamethoprim Ds]  09/29/2010    Rash Biaxin [Clarithromycin]  09/29/2010    Rash Ciprofloxacin  02/27/2014    Rash Ivp Dye [Fd And C Blue No.1]  09/29/2010    Rash Keflex [Cephalexin]  09/29/2010    Rash Nickel  11/06/2017    Contact Dermatitis Nsaids (Non-steroidal Anti-inflammatory Drug)  09/29/2010    Rash Simvastatin  10/30/2014    Myalgia Tetracycline  09/29/2010    Rash Current Immunizations  Reviewed on 8/2/2018 Name Date Influenza High Dose Vaccine PF 11/9/2017 Influenza Vaccine 10/15/2015, 9/27/2014, 11/20/2013, 10/29/2009 12:00 AM, 10/24/2008 12:00 AM  
 Influenza Vaccine PF 10/14/2016 12:00 AM  
 Influenza Vaccine Split 11/9/2011, 10/26/2010 Pneumococcal Conjugate (PCV-13) 7/24/2017 12:00 AM  
 Tdap 4/20/2011 12:00 AM  
 Zoster Recombinant 3/19/2018  Reviewed by Roxanna Blackwood LPN on 6/5/2568 at  6:30 AM  
You Were Diagnosed With   
  
 Codes Comments PVD (peripheral vascular disease) ([facility]    -  Primary ICD-10-CM: I73.9 ICD-9-CM: 443. 9 Chronic obstructive pulmonary disease, unspecified COPD type ([facility]     ICD-10-CM: J44.9 ICD-9-CM: 127 Tobacco abuse     ICD-10-CM: Z72.0 ICD-9-CM: 305.1 Mixed hyperlipidemia     ICD-10-CM: E78.2 ICD-9-CM: 272.2 Coronary artery disease involving native coronary artery of native heart without angina pectoris     ICD-10-CM: I25.10 ICD-9-CM: 414.01   
 DDD (degenerative disc disease), lumbar     ICD-10-CM: M51.36 
ICD-9-CM: 722.52 Anxiety     ICD-10-CM: F41.9 ICD-9-CM: 300.00 IGT (impaired glucose tolerance)     ICD-10-CM: R73.02 
ICD-9-CM: 790.22 Screen for colon cancer     ICD-10-CM: Z12.11 ICD-9-CM: V76.51 Screening mammogram, encounter for     ICD-10-CM: Z12.31 
ICD-9-CM: V76.12 Vitals BP Pulse Temp Resp Height(growth percentile) Weight(growth percentile) 126/68 (BP 1 Location: Right arm, BP Patient Position: Sitting) 78 97.9 °F (36.6 °C) (Oral) 17 5' 6\" (1.676 m) 176 lb (79.8 kg) SpO2 BMI OB Status Smoking Status 94% 28.41 kg/m2 Postmenopausal Current Every Day Smoker Vitals History BMI and BSA Data Body Mass Index Body Surface Area  
 28.41 kg/m 2 1.93 m 2 Preferred Pharmacy Pharmacy Name Phone [address], [address] AT [facility]  [address] [facility] Elaine Silva [phone] Your Updated Medication List  
  
   
This list is accurate as of 8/2/18 10:44 AM.  Always use your most recent med list.  
  
  
  
  
 * ACCU-CHEK COMPACT PLUS TEST Strp Generic drug:  Blood Sugar Diagnostic, Drum USE TO TEST FOR BLOOD SUGAR TWICE DAILY * Blood Sugar Diagnostic, Drum Strp Commonly known as:  ACCU-CHEK COMPACT PLUS TEST For glucose testing once daily as needed. R73.02  
  
 ALPRAZolam 0.5 mg tablet Commonly known as:  Star Carp Take 1 Tab by mouth three (3) times daily as needed for Anxiety. Max Daily Amount: 1.5 mg.  
  
 Blood-Glucose Meter monitoring kit For twice a day testing Blood-Glucose Meter, Drum-type Kit Commonly known as:  ACCU-CHEK COMPACT PLUS CARE For glucose testing once daily as needed. R73.02  
  
 * gabapentin 600 mg tablet Commonly known as:  NEURONTIN Take  by mouth three (3) times daily. * gabapentin 300 mg capsule Commonly known as:  NEURONTIN Lancets Misc Commonly known as:  [address] [place], [place] [address]Th [facility] For glucose testing once daily as needed. R73.02  
  
 levalbuterol tartrate 45 mcg/actuation inhaler Commonly known as:  Idelle Hearing Take 1-2 Puffs by inhalation every four (4) hours as needed for Wheezing. ONETOUCH ULTRA BLUE TEST STRIP strip Generic drug:  glucose blood VI test strips  
  
 oxyCODONE IR 10 mg Tab immediate release tablet Commonly known as:  ROXICODONE  
TK 1 TO 2 TS PO TID PRN P  
  
 PERCOCET  mg per tablet Generic drug:  oxyCODONE-acetaminophen Take 1 Tab by mouth three (3) times daily. raNITIdine 150 mg tablet Commonly known as:  ZANTAC TAKE 1 TABLET BY MOUTH TWICE A DAY FOR ACID REFLUX  
  
 tiZANidine 2 mg tablet Commonly known as:  Marquis Moadrian ZyrTEC 10 mg tablet Generic drug:  cetirizine Take  by mouth daily. * Notice: This list has 4 medication(s) that are the same as other medications prescribed for you. Read the directions carefully, and ask your doctor or other care provider to review them with you. Prescriptions Printed Refills ALPRAZolam (XANAX) 0.5 mg tablet 5 Sig: Take 1 Tab by mouth three (3) times daily as needed for Anxiety. Max Daily Amount: 1.5 mg.  
 Class: Print Route: Oral  
  
We Performed the Following CBC W/O DIFF [38990 CPT(R)] COLOGUARD TEST (FECAL DNA COLORECTAL CANCER SCREENING) [78483 CPT(R)] HEMOGLOBIN A1C W/O EAG [68532 CPT(R)] LIPID PANEL [85377 CPT(R)] METABOLIC PANEL, COMPREHENSIVE [55728 CPT(R)] Follow-up Instructions Return in about 6 months (around 2/2/2019) for disease management. To-Do List   
 08/02/2018 Imaging:  [facility] MAMMO BI SCREENING INCL CAD Patient Instructions Chronic Obstructive Pulmonary Disease (COPD): Care Instructions Your Care Instructions Chronic obstructive pulmonary disease (COPD) is a general term for a group of lung diseases, including emphysema and chronic bronchitis. People with COPD have decreased airflow in and out of the lungs, which makes it hard to breathe. The airways also can get clogged with thick mucus. Cigarette smoking is a major cause of COPD. Although there is no cure for COPD, you can slow its progress. Following your treatment plan and taking care of yourself can help you feel better and live longer. Follow-up care is a key part of your treatment and safety. Be sure to make and go to all appointments, and call your doctor if you are having problems. It's also a good idea to know your test results and keep a list of the medicines you take. How can you care for yourself at home? 
 Staying healthy 
  · Do not smoke. This is the most important step you can take to prevent more damage to your lungs. If you need help quitting, talk to your doctor about stop-smoking programs and medicines. These can increase your chances of quitting for good.  
  · Avoid colds and flu. Get a pneumococcal vaccine shot. If you have had one before, ask your doctor whether you need a second dose. Get the flu vaccine every fall. If you must be around people with colds or the flu, wash your hands often.  
  · Avoid secondhand smoke, air pollution, and high altitudes. Also avoid cold, dry air and hot, humid air. Stay at home with your windows closed when air pollution is bad.  
 Medicines and oxygen therapy 
  · Take your medicines exactly as prescribed.  Call your doctor if you think you are having a problem with your medicine.  
  · You may be taking medicines such as: ¨ Bronchodilators. These help open your airways and make breathing easier. Bronchodilators are either short-acting (work for 6 to 9 hours) or long-acting (work for 24 hours). You inhale most bronchodilators, so they start to act quickly. Always carry your quick-relief inhaler with you in case you need it while you are away from home. ¨ Corticosteroids (prednisone, budesonide). These reduce airway inflammation. They come in pill or inhaled form. You must take these medicines every day for them to work well.  
  · A spacer may help you get more inhaled medicine to your lungs. Ask your doctor or pharmacist if a spacer is right for you. If it is, ask how to use it properly.  
  · Do not take any vitamins, over-the-counter medicine, or herbal products without talking to your doctor first.  
  · If your doctor prescribed antibiotics, take them as directed. Do not stop taking them just because you feel better. You need to take the full course of antibiotics.  
  · Oxygen therapy boosts the amount of oxygen in your blood and helps you breathe easier. Use the flow rate your doctor has recommended, and do not change it without talking to your doctor first.  
Activity 
  · Get regular exercise. Walking is an easy way to get exercise. Start out slowly, and walk a little more each day.  
  · Pay attention to your breathing. You are exercising too hard if you cannot talk while you are exercising.  
  · Take short rest breaks when doing household chores and other activities.  
  · Learn breathing methods-such as breathing through pursed lips-to help you become less short of breath.  
  · If your doctor has not set you up with a pulmonary rehabilitation program, talk to him or her about whether rehab is right for you.  Rehab includes exercise programs, education about your disease and how to manage it, help with diet and other changes, and emotional support. Diet 
  · Eat regular, healthy meals. Use bronchodilators about 1 hour before you eat to make it easier to eat. Eat several small meals instead of three large ones. Drink beverages at the end of the meal. Avoid foods that are hard to chew.  
  · Eat foods that contain protein so that you do not lose muscle mass.  
  · Talk with your doctor if you gain too much weight or if you lose weight without trying.  
 Mental health 
  · Talk to your family, friends, or a therapist about your feelings. It is normal to feel frightened, angry, hopeless, helpless, and even guilty. Talking openly about bad feelings can help you cope. If these feelings last, talk to your doctor. When should you call for help? Call 911 anytime you think you may need emergency care. For example, call if: 
  · You have severe trouble breathing.  
 Call your doctor now or seek immediate medical care if: 
  · You have new or worse trouble breathing.  
  · You cough up blood.  
  · You have a fever.  
 Watch closely for changes in your health, and be sure to contact your doctor if: 
  · You cough more deeply or more often, especially if you notice more mucus or a change in the color of your mucus.  
  · You have new or worse swelling in your legs or belly.  
  · You are not getting better as expected. Where can you learn more? Go to http://dean-berny.info/. Francesca Dorsey in the search box to learn more about \"Chronic Obstructive Pulmonary Disease (COPD): Care Instructions. \" Current as of: December 6, 2017 Content Version: 11.7 © 4000-8631 Healthwise, Incorporated. Care instructions adapted under license by [employer] (which disclaims liability or warranty for this information).  If you have questions about a medical condition or this instruction, always ask your healthcare professional. Sydni Villa[employer] disclaims any warranty or liability for your use of this information. Introducing [facility] & HEALTH SERVICES! [employer] introduces ZarthCode patient portal. Now you can access parts of your medical record, email your doctor's office, and request medication refills online. 1. In your internet browser, go to [email or web address] 2. Click on the First Time User? Click Here link in the Sign In box. You will see the New Member Sign Up page. 3. Enter your ZarthCode Access Code exactly as it appears below. You will not need to use this code after youve completed the sign-up process. If you do not sign up before the expiration date, you must request a new code. · ZarthCode Access Code: [email or web address] Expires: 10/31/2018  9:55 AM 
 
4. Enter the last four digits of your Social Security Number (xxxx) and Date of Birth (mm/dd/yyyy) as indicated and click Submit. You will be taken to the next sign-up page. 5. Create a ZarthCode ID. This will be your ZarthCode login ID and cannot be changed, so think of one that is secure and easy to remember. 6. Create a ZarthCode password. You can change your password at any time. 7. Enter your Password Reset Question and Answer. This can be used at a later time if you forget your password. 8. Enter your e-mail address. You will receive e-mail notification when new information is available in [address] 9. Click Sign Up. You can now view and download portions of your medical record. 10. Click the Download Summary menu link to download a portable copy of your medical information. If you have questions, please visit the Frequently Asked Questions section of the ZarthCode website. Remember, ZarthCode is NOT to be used for urgent needs. For medical emergencies, dial 911. Now available from your iPhone and Android! Please provide this summary of care documentation to your next provider. Your primary care clinician is listed as [facility] Aditya. If you have any questions after today's visit, please call [phone].

## 2018-08-02 NOTE — PATIENT INSTRUCTIONS
Chronic Obstructive Pulmonary Disease (COPD): Care Instructions  Your Care Instructions    Chronic obstructive pulmonary disease (COPD) is a general term for a group of lung diseases, including emphysema and chronic bronchitis. People with COPD have decreased airflow in and out of the lungs, which makes it hard to breathe. The airways also can get clogged with thick mucus. Cigarette smoking is a major cause of COPD. Although there is no cure for COPD, you can slow its progress. Following your treatment plan and taking care of yourself can help you feel better and live longer. Follow-up care is a key part of your treatment and safety. Be sure to make and go to all appointments, and call your doctor if you are having problems. It's also a good idea to know your test results and keep a list of the medicines you take. How can you care for yourself at home?   Staying healthy    · Do not smoke. This is the most important step you can take to prevent more damage to your lungs. If you need help quitting, talk to your doctor about stop-smoking programs and medicines. These can increase your chances of quitting for good.     · Avoid colds and flu. Get a pneumococcal vaccine shot. If you have had one before, ask your doctor whether you need a second dose. Get the flu vaccine every fall. If you must be around people with colds or the flu, wash your hands often.     · Avoid secondhand smoke, air pollution, and high altitudes. Also avoid cold, dry air and hot, humid air. Stay at home with your windows closed when air pollution is bad.    Medicines and oxygen therapy    · Take your medicines exactly as prescribed. Call your doctor if you think you are having a problem with your medicine.     · You may be taking medicines such as:  ¨ Bronchodilators. These help open your airways and make breathing easier. Bronchodilators are either short-acting (work for 6 to 9 hours) or long-acting (work for 24 hours).  You inhale most bronchodilators, so they start to act quickly. Always carry your quick-relief inhaler with you in case you need it while you are away from home. ¨ Corticosteroids (prednisone, budesonide). These reduce airway inflammation. They come in pill or inhaled form. You must take these medicines every day for them to work well.     · A spacer may help you get more inhaled medicine to your lungs. Ask your doctor or pharmacist if a spacer is right for you. If it is, ask how to use it properly.     · Do not take any vitamins, over-the-counter medicine, or herbal products without talking to your doctor first.     · If your doctor prescribed antibiotics, take them as directed. Do not stop taking them just because you feel better. You need to take the full course of antibiotics.     · Oxygen therapy boosts the amount of oxygen in your blood and helps you breathe easier. Use the flow rate your doctor has recommended, and do not change it without talking to your doctor first.   Activity    · Get regular exercise. Walking is an easy way to get exercise. Start out slowly, and walk a little more each day.     · Pay attention to your breathing. You are exercising too hard if you cannot talk while you are exercising.     · Take short rest breaks when doing household chores and other activities.     · Learn breathing methods-such as breathing through pursed lips-to help you become less short of breath.     · If your doctor has not set you up with a pulmonary rehabilitation program, talk to him or her about whether rehab is right for you. Rehab includes exercise programs, education about your disease and how to manage it, help with diet and other changes, and emotional support. Diet    · Eat regular, healthy meals. Use bronchodilators about 1 hour before you eat to make it easier to eat. Eat several small meals instead of three large ones.  Drink beverages at the end of the meal. Avoid foods that are hard to chew.     · Eat foods that contain protein so that you do not lose muscle mass.     · Talk with your doctor if you gain too much weight or if you lose weight without trying.    Mental health    · Talk to your family, friends, or a therapist about your feelings. It is normal to feel frightened, angry, hopeless, helpless, and even guilty. Talking openly about bad feelings can help you cope. If these feelings last, talk to your doctor. When should you call for help? Call 911 anytime you think you may need emergency care. For example, call if:    · You have severe trouble breathing.    Call your doctor now or seek immediate medical care if:    · You have new or worse trouble breathing.     · You cough up blood.     · You have a fever.    Watch closely for changes in your health, and be sure to contact your doctor if:    · You cough more deeply or more often, especially if you notice more mucus or a change in the color of your mucus.     · You have new or worse swelling in your legs or belly.     · You are not getting better as expected. Where can you learn more? Go to http://dean-berny.info/. Reina Castellanos in the search box to learn more about \"Chronic Obstructive Pulmonary Disease (COPD): Care Instructions. \"  Current as of: December 6, 2017  Content Version: 11.7  © 9116-3517 [employer]. Care instructions adapted under license by [employer] (which disclaims liability or warranty for this information). If you have questions about a medical condition or this instruction, always ask your healthcare professional. [place] [address] any warranty or liability for your use of this information.

## 2018-08-02 NOTE — PROGRESS NOTES
[facility] Note    Farooq Antunez is a 79 y.o. female who was seen in clinic today (8/2/2018). Subjective:  Cardiovascular Review:  The patient has hypertension, hyperlipidemia, CAD s/p stenting and peripheral vascular disease. Seen by cardiology, Dr Marina Koenig. Patient underwent nuclear stress test with normal findings. Diet and Lifestyle: generally follows a low fat low cholesterol diet, generally follows a low sodium diet, smoker 1 ppd  Home BP Monitoring: is not measured at home. Pertinent ROS: taking medications as instructed, no medication side effects noted, no TIA's, no chest pain on exertion, no dyspnea on exertion, no swelling of ankles. Patient refuses to take cholesterol medication as it causes myalgias. COPD Review:  The patient is being seen for follow up of COPD and history of 45 pack years tobacco use. Oxygen: She currently is not on home oxygen therapy. Symptoms: chronic dyspnea: severity = not present: course of sx: stable. Patient uses 1 pillows at night. Patient does smoke cigarettes. Peripheral Vascular Disease  Patient in with known h/o PVD. Patient denies symptoms at present. 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 The patient's risks factors for atherosclerosis include smoking, hypertension, peripheral vascular disease, hypercholesterolemia. She is seen by Dr Tomasa Vazquez annually. Osteoarthritis and Chronic Pain:  Patient has osteoarthritis and spondylosis, primarily affecting the back and hands. Symptoms onset: problem is longstanding. RA testing is negative. Rheumatological ROS: using narcotic analgesics regularly daily in stable pattern with good pain control and good quality of life. Pain managed by Dr. Kobi Harman - pain management. Patient had lumbar laminectomy in 8/2015 with Dr. Maria Elena Rucker without improvement.  Patient previously  evaluated by Dr. Cha Ponce, but is going to follow up with provided in [address] [place] [address],B-1. Anxiety Review:  Patient is seen for anxiety disorder, panic attacks and claustrophobia. Current treatment includes Xanax TID. Patient on Xanax for 17 years. Patient previously treated Paxil, Zoloft, Prozac, Wellbutrin and had adverse reaction to medications. Ongoing symptoms include: racing thoughts, psychomotor agitation. Patient denies: sweating, chest pain, shortness of breath, difficulty concentrating, suicidal ideation. Reported side effects from the treatment: none. Prior to Admission medications    Medication Sig Start Date End Date Taking? Authorizing Provider   Damion Lao BLUE TEST STRIP strip  7/27/18  Yes Historical Provider   ALPRAZolam (XANAX) 0.5 mg tablet Take 1 Tab by mouth three (3) times daily as needed for Anxiety. Max Daily Amount: 1.5 mg. 8/2/18  Yes Soraya Cornea, NP   gabapentin (NEURONTIN) 300 mg capsule  3/28/18  Yes Historical Provider   oxyCODONE IR (ROXICODONE) 10 mg tab immediate release tablet TK 1 TO 2 TS PO TID PRN P 3/4/18  Yes Historical Provider   Blood Sugar Diagnostic, Drum (ACCU-CHEK COMPACT PLUS TEST) strp For glucose testing once daily as needed. R73.02 3/29/18  Yes Soraya Cornea, NP   Blood-Glucose Meter, Drum-type (ACCU-CHEK COMPACT PLUS CARE) kit For glucose testing once daily as needed. R73.02 3/29/18  Yes Soraya Cornea, NP   Lancets (ACCU-CHEK MULTICLIX LANCET) misc For glucose testing once daily as needed. R73.02 3/29/18  Yes Soraya Cornea, NP   ACCU-CHEK COMPACT PLUS TEST strp USE TO TEST FOR BLOOD SUGAR TWICE DAILY 7/10/17  Yes Soraya Cornea, NP   tiZANidine (ZANAFLEX) 2 mg tablet  2/17/16  Yes Historical Provider   levalbuterol tartrate (XOPENEX) 45 mcg/actuation inhaler Take 1-2 Puffs by inhalation every four (4) hours as needed for Wheezing.  10/21/15  Yes Soraya Cornea, NP   ranitidine (ZANTAC) 150 mg tablet TAKE 1 TABLET BY MOUTH TWICE A DAY FOR ACID REFLUX 10/13/14  Yes Veronica Carlson MD Blood-Glucose Meter monitoring kit For twice a day testing 2/27/14  Yes Governor SALVADOR Rodriguez   cetirizine (ZYRTEC) 10 mg tablet Take  by mouth daily. Yes Historical Provider   gabapentin (NEURONTIN) 600 mg tablet Take  by mouth three (3) times daily. Historical Provider   oxycodone-acetaminophen (PERCOCET)  mg per tablet Take 1 Tab by mouth three (3) times daily. 9/29/10   Historical Provider          Allergies   Allergen Reactions    Penicillins Rash, Itching and Swelling     Of throat    Prednisone Anaphylaxis    Bactrim [Sulfamethoprim Ds] Rash    Biaxin [Clarithromycin] Rash    Ciprofloxacin Rash    Ivp Dye [Fd And C Blue No.1] Rash    Keflex [Cephalexin] Rash    Nickel Contact Dermatitis    Nsaids (Non-Steroidal Anti-Inflammatory Drug) Rash    Simvastatin Myalgia    Tetracycline Rash           ROS  See HPI    Objective:   Physical Exam   Constitutional: She is oriented to person, place, and time. She appears well-developed and well-nourished. Neck: Normal range of motion. Neck supple. No JVD present. Carotid bruit is present (bilaterally L>R). No thyromegaly present. Cardiovascular: Normal rate and regular rhythm. Exam reveals decreased pulses. Exam reveals no gallop and no friction rub. Murmur heard. Pulses:       Carotid pulses are on the right side with bruit, and on the left side with bruit. Pulmonary/Chest: Effort normal. No respiratory distress. She has no decreased breath sounds. She has no wheezes. She has no rhonchi. Musculoskeletal: She exhibits no edema. Lymphadenopathy:     She has no cervical adenopathy. Neurological: She is alert and oriented to person, place, and time. Psychiatric: She has a normal mood and affect. Her behavior is normal.   Nursing note and vitals reviewed.         Visit Vitals    /68 (BP 1 Location: Right arm, BP Patient Position: Sitting)    Pulse 78    Temp 97.9 °F (36.6 °C) (Oral)    Resp 17    Ht 5' 6\" (1.676 m)    Wt 176 lb (79.8 kg)    SpO2 94%    BMI 28.41 kg/m2       Assessment & Plan:  Diagnoses and all orders for this visit:    1. PVD (peripheral vascular disease) ([address]  Stable, managed by vascular surgeon. No changes to current therapy    2. Chronic obstructive pulmonary disease, unspecified COPD type (HCC)  Stable, no changes to current therapy    3. Tobacco abuse  Counseled patient on need to quit smoking. 4. Mixed hyperlipidemia  -     CBC W/O DIFF  -     METABOLIC PANEL, COMPREHENSIVE  -     LIPID PANEL    5. Coronary artery disease involving native coronary artery of native heart without angina pectoris  Stable, managed by cardiology. No changes to current therapy    6. DDD (degenerative disc disease), lumbar  Managed by pain management and orthopedics. No changes in therapy. 7. Anxiety  Failure with multiple SSRIs. Stable, no changes to current therapy  -     ALPRAZolam (XANAX) 0.5 mg tablet; Take 1 Tab by mouth three (3) times daily as needed for Anxiety. Max Daily Amount: 1.5 mg.    8. IGT (impaired glucose tolerance)  Reviewed diet and lifestyle changes.  -     HEMOGLOBIN A1C W/O EAG    9. Screen for colon cancer  -     COLOGUARD TEST (FECAL DNA COLORECTAL CANCER SCREENING)    10. Screening mammogram, encounter for  -     CONY MAMMO BI SCREENING INCL CAD; Future      I have discussed the diagnosis with the patient and the intended plan as seen in the above orders. The patient has received an after-visit summary along with patient information handout. I have discussed medication side effects and warnings with the patient as well. Follow-up Disposition:  Return in about 6 months (around 2/2/2019) for disease management.         Briseyda Castro NP

## 2018-08-17 LAB — COLOGUARD TEST, EXTERNAL: NEGATIVE

## 2018-11-05 ENCOUNTER — IMPORTED ENCOUNTER (OUTPATIENT)
Dept: URBAN - METROPOLITAN AREA CLINIC 50 | Facility: CLINIC | Age: 68
End: 2018-11-05

## 2019-01-25 DIAGNOSIS — F41.9 ANXIETY: ICD-10-CM

## 2019-01-28 ENCOUNTER — HOSPITAL ENCOUNTER (OUTPATIENT)
Dept: LAB | Age: 69
Discharge: HOME | End: 2019-01-28
Payer: MEDICARE

## 2019-01-28 PROCEDURE — 36415 COLL VENOUS BLD VENIPUNCTURE: CPT

## 2019-01-28 PROCEDURE — 80061 LIPID PANEL: CPT

## 2019-01-28 PROCEDURE — 80053 COMPREHEN METABOLIC PANEL: CPT

## 2019-01-28 PROCEDURE — 85027 COMPLETE CBC AUTOMATED: CPT

## 2019-01-28 PROCEDURE — 83036 HEMOGLOBIN GLYCOSYLATED A1C: CPT

## 2019-01-28 NOTE — TELEPHONE ENCOUNTER
----- Message from Lokesh Ramires sent at 1/28/2019 10:48 AM EST -----  Regarding: Nazario Jarvis/Refill  Pt requesting a refill for Rx \"Alprozalm 0.5mg\" to be called into [facility] at [phone]. Pt stated completely out of Rx.  Best contact:[phone]

## 2019-01-29 LAB
ALBUMIN SERPL-MCNC: 4.5 G/DL (ref 3.6–4.8)
ALBUMIN/GLOB SERPL: 1.5 {RATIO} (ref 1.2–2.2)
ALP SERPL-CCNC: 93 IU/L (ref 39–117)
ALT SERPL-CCNC: 24 IU/L (ref 0–32)
AST SERPL-CCNC: 21 IU/L (ref 0–40)
BILIRUB SERPL-MCNC: 0.5 MG/DL (ref 0–1.2)
BUN SERPL-MCNC: 9 MG/DL (ref 8–27)
BUN/CREAT SERPL: 12 (ref 12–28)
CALCIUM SERPL-MCNC: 9.6 MG/DL (ref 8.7–10.3)
CHLORIDE SERPL-SCNC: 101 MMOL/L (ref 96–106)
CHOLEST SERPL-MCNC: 219 MG/DL (ref 100–199)
CO2 SERPL-SCNC: 24 MMOL/L (ref 20–29)
CREAT SERPL-MCNC: 0.74 MG/DL (ref 0.57–1)
ERYTHROCYTE [DISTWIDTH] IN BLOOD BY AUTOMATED COUNT: 14.2 % (ref 12.3–15.4)
GLOBULIN SER CALC-MCNC: 3.1 G/DL (ref 1.5–4.5)
GLUCOSE SERPL-MCNC: 95 MG/DL (ref 65–99)
HBA1C MFR BLD: 5.9 % (ref 4.8–5.6)
HCT VFR BLD AUTO: 47.5 % (ref 34–46.6)
HDLC SERPL-MCNC: 76 MG/DL
HGB BLD-MCNC: 16.1 G/DL (ref 11.1–15.9)
INTERPRETATION, 910389: NORMAL
LDLC SERPL CALC-MCNC: 127 MG/DL (ref 0–99)
MCH RBC QN AUTO: 29.7 PG (ref 26.6–33)
MCHC RBC AUTO-ENTMCNC: 33.9 G/DL (ref 31.5–35.7)
MCV RBC AUTO: 88 FL (ref 79–97)
PLATELET # BLD AUTO: 231 X10E3/UL (ref 150–379)
POTASSIUM SERPL-SCNC: 4.6 MMOL/L (ref 3.5–5.2)
PROT SERPL-MCNC: 7.6 G/DL (ref 6–8.5)
RBC # BLD AUTO: 5.42 X10E6/UL (ref 3.77–5.28)
SODIUM SERPL-SCNC: 140 MMOL/L (ref 134–144)
TRIGL SERPL-MCNC: 80 MG/DL (ref 0–149)
VLDLC SERPL CALC-MCNC: 16 MG/DL (ref 5–40)
WBC # BLD AUTO: 6.9 X10E3/UL (ref 3.4–10.8)

## 2019-01-29 RX ORDER — ALPRAZOLAM 0.5 MG/1
TABLET ORAL
Qty: 90 TAB | Refills: 5 | Status: SHIPPED | OUTPATIENT
Start: 2019-01-29 | End: 2019-07-18 | Stop reason: SDUPTHER

## 2019-01-29 NOTE — TELEPHONE ENCOUNTER
[facility] called in prescription for patient xanax via     Requested Prescriptions     Signed Prescriptions Disp Refills    ALPRAZolam (XANAX) 0.5 mg tablet 90 Tab 5     Sig: TAKE 1 TABLET BY MOUTH THREE TIMES DAILY AS NEEDED FOR ANXIETY.  MAX DAILY AMOUNT 1.5MG     Authorizing Provider: Ijeoma Baez

## 2019-02-11 NOTE — TELEPHONE ENCOUNTER
Pharmacy requesting medication refill     Requested Prescriptions     Pending Prescriptions Disp Refills    [address] TEST STRIP strip       Pharmacy on file verified  [employer])

## 2019-02-19 ENCOUNTER — TELEPHONE (OUTPATIENT)
Dept: FAMILY MEDICINE CLINIC | Age: 69
End: 2019-02-19

## 2019-02-19 NOTE — TELEPHONE ENCOUNTER
Carmelita from [employer] to let us know that Medicare will not pay for patient Onetouch Ultra test strips due to she is not Diabetic and she's only pre diabetic per pharmacist patient was aware

## 2019-02-26 ENCOUNTER — OFFICE VISIT (OUTPATIENT)
Dept: FAMILY MEDICINE CLINIC | Age: 69
End: 2019-02-26

## 2019-02-26 VITALS
BODY MASS INDEX: 28.8 KG/M2 | RESPIRATION RATE: 20 BRPM | TEMPERATURE: 97.2 F | OXYGEN SATURATION: 96 % | DIASTOLIC BLOOD PRESSURE: 68 MMHG | SYSTOLIC BLOOD PRESSURE: 140 MMHG | WEIGHT: 179.2 LBS | HEIGHT: 66 IN | HEART RATE: 86 BPM

## 2019-02-26 DIAGNOSIS — I25.10 CORONARY ARTERY DISEASE INVOLVING NATIVE CORONARY ARTERY OF NATIVE HEART WITHOUT ANGINA PECTORIS: Primary | ICD-10-CM

## 2019-02-26 DIAGNOSIS — J44.9 CHRONIC OBSTRUCTIVE PULMONARY DISEASE, UNSPECIFIED COPD TYPE (HCC): ICD-10-CM

## 2019-02-26 DIAGNOSIS — I73.9 PVD (PERIPHERAL VASCULAR DISEASE) (HCC): ICD-10-CM

## 2019-02-26 DIAGNOSIS — R73.02 IGT (IMPAIRED GLUCOSE TOLERANCE): ICD-10-CM

## 2019-02-26 DIAGNOSIS — E78.2 MIXED HYPERLIPIDEMIA: ICD-10-CM

## 2019-02-26 DIAGNOSIS — Z72.0 TOBACCO ABUSE: ICD-10-CM

## 2019-02-26 DIAGNOSIS — M51.36 DDD (DEGENERATIVE DISC DISEASE), LUMBAR: ICD-10-CM

## 2019-02-26 DIAGNOSIS — Z13.1 ENCOUNTER FOR SCREENING FOR DIABETES MELLITUS: ICD-10-CM

## 2019-02-26 DIAGNOSIS — F41.9 ANXIETY: ICD-10-CM

## 2019-02-26 RX ORDER — AZITHROMYCIN 250 MG/1
TABLET, FILM COATED ORAL
Qty: 6 TAB | Refills: 0 | Status: SHIPPED | OUTPATIENT
Start: 2019-02-26 | End: 2019-03-03

## 2019-02-26 RX ORDER — BACLOFEN 10 MG/1
TABLET ORAL
Refills: 2 | COMMUNITY
Start: 2019-02-06 | End: 2020-11-03

## 2019-02-26 RX ORDER — OXYCODONE HYDROCHLORIDE 10 MG/1
TABLET, FILM COATED, EXTENDED RELEASE ORAL
Refills: 0 | COMMUNITY
Start: 2019-02-07 | End: 2020-11-11

## 2019-02-26 NOTE — PROGRESS NOTES
Chief Complaint   Patient presents with    COPD     f/u    Cholesterol Problem     f/u    Anxiety     f/u       Reviewed Record in preparation for visit and have obtained necessary documentation. Identified pt with two pt identifiers (Name @ )    Health Maintenance Due   Topic    GLAUCOMA SCREENING Q2Y     BREAST CANCER SCRN MAMMOGRAM     Pneumococcal 65+ Low/Medium Risk (2 of 2 - PPSV23)    Influenza Age 5 to Adult          1. Have you been to the ER, urgent care clinic since your last visit? Hospitalized since your last visit? no      2. Have you seen or consulted any other health care providers outside of the [address] [place] [address] since your last visit? Include any pap smears or colon screening.  no

## 2019-02-26 NOTE — PATIENT INSTRUCTIONS
Learning About COPD  What is COPD? COPD is a lung disease that makes it hard to breathe. COPD stands for chronic obstructive pulmonary disease. It is caused by damage to the lungs over many years, usually from smoking. COPD is a mix of two diseases: chronic bronchitis and emphysema. Other things that may put you at risk for COPD include breathing chemical fumes, dust, or air pollution over a long period of time. Secondhand smoke is also bad. In chronic bronchitis, the airways that carry air to the lungs (bronchial tubes) get inflamed and make a lot of mucus. This can narrow or block the airways, making it hard for you to breathe. In emphysema, the air sacs in your lungs are damaged and lose their stretch. Less air gets in and out of your lungs, which makes you feel short of breath. What can you expect when you have COPD? COPD gets worse over time. You cannot undo the damage to your lungs. Over time, you may find that:  · You get short of breath even when you do simple things like get dressed or fix a meal.  · It is hard to eat or exercise. · You lose weight and feel weaker. But there are things you can do to prevent more damage and feel better. What are the symptoms? The main symptoms are:  · A cough that will not go away. · Mucus that comes up when you cough. · Shortness of breath that gets worse with activity. At times, your symptoms may suddenly flare up and get much worse. This is a called a COPD exacerbation (say \"egg-TAIWO--BAY-edgar\"). When this happens, your usual symptoms quickly get worse and stay bad. This can be dangerous. You may have to go to the hospital.  How can you keep COPD from getting worse? Don't smoke. That is the best way to keep COPD from getting worse. If you already smoke, it is never too late to stop. If you need help quitting, talk to your doctor about stop-smoking programs and medicines. These can increase your chances of quitting for good.   You can do other things to keep COPD from getting worse:  · Avoid bad air. Air pollution, chemical fumes, and dust also can make COPD worse. · Get a flu shot every year. A shot may keep the flu from turning into something more serious, like pneumonia. A flu shot also may lower your chances of having a COPD flare-up. · Get a pneumococcal vaccine shot. If you have had one before, ask your doctor if you need another dose. How is COPD treated? COPD is treated with medicines and oxygen. You also can take steps at home to stay healthy and keep your COPD from getting worse. Medicines and oxygen therapy  · You may be taking medicines such as:  ? Bronchodilators. These help open your airways and make breathing easier. Bronchodilators are either short-acting (work for 6 to 9 hours) or long-acting (work for 24 hours). You inhale most bronchodilators, so they start to act quickly. Always carry your quick-relief inhaler with you in case you need it while you are away from home. ? Corticosteroids. These reduce airway inflammation. They come in pill or inhaled form. You must take these medicines every day for them to work well. · Take your medicines exactly as prescribed. Call your doctor if you think you are having a problem with your medicine. · Oxygen therapy boosts the amount of oxygen in your blood and helps you breathe easier. Use the flow rate your doctor has recommended, and do not change it without talking to your doctor first.  Other care at home  · If your doctor recommends it, get more exercise. Walking is a good choice. Bit by bit, increase the amount you walk every day. Try for at least 30 minutes on most days of the week. · Learn breathing methods--such as breathing through pursed lips--to help you become less short of breath. · If your doctor has not set you up with a pulmonary rehabilitation program, talk to him or her about whether rehab is right for you.  Rehab includes exercise programs, education about your disease and how to manage it, help with diet and other changes, and emotional support. · Eat regular, healthy meals. Use bronchodilators about 1 hour before you eat to make it easier to eat. Eat several small meals instead of three large ones. Drink beverages at the end of the meal. Avoid foods that are hard to chew. Follow-up care is a key part of your treatment and safety. Be sure to make and go to all appointments, and call your doctor if you are having problems. It's also a good idea to know your test results and keep a list of the medicines you take. Where can you learn more? Go to http://dean-berny.info/. Enter V314 in the search box to learn more about \"Learning About COPD. \"  Current as of: September 5, 2018  Content Version: 11.9  © 3349-5717 [employer]. Care instructions adapted under license by [employer] (which disclaims liability or warranty for this information). If you have questions about a medical condition or this instruction, always ask your healthcare professional. [address] any warranty or liability for your use of this information.

## 2019-02-26 NOTE — PROGRESS NOTES
[facility] Note    Brody Hanna is a 81 y.o. female who was seen in clinic today (2/26/2019). Subjective:  Cardiovascular Review:  The patient has hypertension, hyperlipidemia, CAD s/p stenting and peripheral vascular disease. Seen by cardiology, Dr Tone Emery. Patient underwent nuclear stress test (2017) with normal findings. Diet and Lifestyle: generally follows a low fat low cholesterol diet, generally follows a low sodium diet, smoker 1 ppd  Home BP Monitoring: is not measured at home. Pertinent ROS: taking medications as instructed, no medication side effects noted, no TIA's, no chest pain on exertion, no dyspnea on exertion, no swelling of ankles. Patient refuses to take cholesterol medication as it causes myalgias. COPD Review:  The patient is being seen for follow up of COPD and history of 45 pack years tobacco use. Oxygen: She currently is not on home oxygen therapy. Symptoms: chronic dyspnea: severity = not present: course of sx: stable. Patient uses 1 pillows at night. Patient does smoke cigarettes. Peripheral Vascular Disease  Patient in with known h/o PVD. Patient denies symptoms at present. 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 The patient's risks factors for atherosclerosis include smoking, hypertension, peripheral vascular disease, hypercholesterolemia. She is seen by Dr Beckie Pallas annually. Osteoarthritis and Chronic Pain:  Patient has osteoarthritis and spondylosis, primarily affecting the back and hands. Symptoms onset: problem is longstanding. RA testing is negative. Rheumatological ROS: using narcotic analgesics regularly daily in stable pattern with good pain control and good quality of life. Pain managed by Dr. Boucher - pain management. Patient had lumbar laminectomy in 8/2015 with Dr. Wilfrid Petty without improvement.  Patient previously evaluated by Dr. Octaviano Raymond, but is going to follow up with provided in [facility]. Anxiety Review:  Patient is seen for anxiety disorder, panic attacks and claustrophobia. Current treatment includes Xanax TID. Patient on Xanax for 20+ years. Patient previously treated Paxil, Zoloft, Prozac, Wellbutrin and had adverse reaction to medications. Ongoing symptoms include: racing thoughts, psychomotor agitation. Patient denies: sweating, chest pain, shortness of breath, difficulty concentrating, suicidal ideation. Reported side effects from the treatment: none. Prior to Admission medications    Medication Sig Start Date End Date Taking? Authorizing Provider   OXYCONTIN 10 mg ER tablet TK 1 T PO Q 12 H FOR 30 DAYS 2/7/19  Yes Provider, Historical   baclofen (LIORESAL) 10 mg tablet  2/6/19  Yes Provider, Historical   azithromycin (ZITHROMAX) 250 mg tablet Take 2 tablets today, then take 1 tablet daily 2/26/19 3/3/19 Yes Emily Jarvis NP   ONETOUCH ULTRA BLUE TEST STRIP strip For glucose testing once daily DX: R73.02 2/12/19  Yes Emily Jarvis NP   ALPRAZolam (XANAX) 0.5 mg tablet TAKE 1 TABLET BY MOUTH THREE TIMES DAILY AS NEEDED FOR ANXIETY. MAX DAILY AMOUNT 1.5MG 1/29/19  Yes Liban Abad NP   gabapentin (NEURONTIN) 300 mg capsule  3/28/18  Yes Provider, Historical   oxyCODONE IR (ROXICODONE) 10 mg tab immediate release tablet TK 1 TO 2 TS PO TID PRN P 3/4/18  Yes Provider, Historical   Blood Sugar Diagnostic, Drum (ACCU-CHEK COMPACT PLUS TEST) strp For glucose testing once daily as needed. R73.02 3/29/18  Yes Emily Jarvis NP   Blood-Glucose Meter, Drum-type (ACCU-CHEK COMPACT PLUS CARE) kit For glucose testing once daily as needed. R73.02 3/29/18  Yes Emily Jarvis NP   Lancets (ACCU-CHEK MULTICLIX LANCET) misc For glucose testing once daily as needed.  R73.02 3/29/18  Yes Emily Jarvis NP   ACCU-CHEK COMPACT PLUS TEST strp USE TO TEST FOR BLOOD SUGAR TWICE DAILY 7/10/17  Yes Emily Jarvis, SALVADOR   tiChicodine (ZANAFLEX) 2 mg tablet  2/17/16  Yes Provider, Historical   levalbuterol tartrate (XOPENEX) 45 mcg/actuation inhaler Take 1-2 Puffs by inhalation every four (4) hours as needed for Wheezing. 10/21/15  Yes Terrence Jarvis NP   ranitidine (ZANTAC) 150 mg tablet TAKE 1 TABLET BY MOUTH TWICE A DAY FOR ACID REFLUX 10/13/14  Yes Cata Cardoza MD   Blood-Glucose Meter monitoring kit For twice a day testing 2/27/14  Yes Terrence Jarvis NP   cetirizine (ZYRTEC) 10 mg tablet Take  by mouth daily. Yes Provider, Historical   gabapentin (NEURONTIN) 600 mg tablet Take  by mouth three (3) times daily. Provider, Historical   oxycodone-acetaminophen (PERCOCET)  mg per tablet Take 1 Tab by mouth three (3) times daily. 9/29/10   Provider, Historical          Allergies   Allergen Reactions    Penicillins Rash, Itching and Swelling     Of throat    Prednisone Anaphylaxis    Bactrim [Sulfamethoprim Ds] Rash    Biaxin [Clarithromycin] Rash    Ciprofloxacin Rash    Ivp Dye [Fd And C Blue No.1] Rash    Keflex [Cephalexin] Rash    Nickel Contact Dermatitis    Nsaids (Non-Steroidal Anti-Inflammatory Drug) Rash    Simvastatin Myalgia    Tetracycline Rash           ROS  See HPI    Objective:   Physical Exam   Constitutional: She is oriented to person, place, and time. She appears well-developed and well-nourished. Neck: Normal range of motion. Neck supple. No JVD present. Carotid bruit is not present. No thyromegaly present. Cardiovascular: Normal rate, regular rhythm and intact distal pulses. Exam reveals no gallop and no friction rub. No murmur heard. Pulmonary/Chest: Effort normal and breath sounds normal. No respiratory distress. Musculoskeletal: She exhibits no edema. Lymphadenopathy:     She has no cervical adenopathy. Neurological: She is alert and oriented to person, place, and time. Psychiatric: She has a normal mood and affect.  Her behavior is normal.   Nursing note and vitals reviewed. Visit Vitals  /68 (BP 1 Location: Left arm, BP Patient Position: Sitting)   Pulse 86   Temp 97.2 °F (36.2 °C) (Oral)   Resp 20   Ht 5' 6\" (1.676 m)   Wt 179 lb 3.2 oz (81.3 kg)   SpO2 96%   BMI 28.92 kg/m²       Assessment & Plan:  Diagnoses and all orders for this visit:    1. Coronary artery disease involving native coronary artery of native heart without angina pectoris  Managed by cardiology, no changes. 2. Chronic obstructive pulmonary disease, unspecified COPD type (HCC)  Stable, no changes to current therapy    3. PVD (peripheral vascular disease) ([facility] [address]  Managed by vascular surgery, no changes. 4. Anxiety  Failure with multiple SSRI/SNRI. Patient is resistant to weaning off of medication. 5. DDD (degenerative disc disease), lumbar  Managed by pain management and orthopedics, no changes. 6. Mixed hyperlipidemia  Intolerant of statins - alternative medications deferred. -     CBC W/O DIFF; Future  -     METABOLIC PANEL, COMPREHENSIVE; Future  -     LIPID PANEL; Future    7. Tobacco abuse  Counseled patient on need to quit smoking. 8. IGT (impaired glucose tolerance)  Reviewed diet and lifestyle changes.  -     GLUCOSE, GESTATIONAL, 3 HR TOLERANCE  -     HEMOGLOBIN A1C W/O EAG; Future      I have discussed the diagnosis with the patient and the intended plan as seen in the above orders. The patient has received an after-visit summary along with patient information handout. I have discussed medication side effects and warnings with the patient as well. Follow-up Disposition:  Return in about 6 months (around 8/26/2019) for disease management.         Erna Leach NP

## 2019-04-04 ENCOUNTER — TELEPHONE (OUTPATIENT)
Dept: FAMILY MEDICINE CLINIC | Age: 69
End: 2019-04-04

## 2019-04-04 RX ORDER — AZITHROMYCIN 250 MG/1
TABLET, FILM COATED ORAL
Qty: 6 TAB | Refills: 0 | Status: SHIPPED | OUTPATIENT
Start: 2019-04-04 | End: 2019-04-09

## 2019-04-04 NOTE — TELEPHONE ENCOUNTER
----- Message from Bhanu Eunice sent at 4/4/2019  8:38 AM EDT -----  Regarding: SALVADOR Jarvis/Telephone  Pt  Tamara Cervantes requesting a Rx be called in to [address] in [place] [phone]. He states pt has bronchitis and is coughing and wheezing. They have tried seeking care at ER and was not seen and lives over an hour away from the practice. Best contact [phone].

## 2019-07-18 DIAGNOSIS — F41.9 ANXIETY: ICD-10-CM

## 2019-07-18 NOTE — TELEPHONE ENCOUNTER
Pt. is calling requesting a refill on the following meds, Pharm on file verified.      LOV 02/26/2019  Last refill  01/29/2019    Requested Prescriptions     Pending Prescriptions Disp Refills    ALPRAZolam (XANAX) 0.5 mg tablet 90 Tab 5

## 2019-07-18 NOTE — TELEPHONE ENCOUNTER
LOV 2/2019  Has appointment 8/27/2019    The Prescription Monitoring Program has been reviewed for recent activity regarding controlled substances for this patient.      Per  last refill 06/23/2019 #90    Please see  print out in your box

## 2019-07-19 RX ORDER — ALPRAZOLAM 0.5 MG/1
TABLET ORAL
Qty: 90 TAB | Refills: 5 | OUTPATIENT
Start: 2019-07-19 | End: 2020-01-06 | Stop reason: SDUPTHER

## 2019-09-14 LAB
ALBUMIN SERPL-MCNC: 4.2 G/DL (ref 3.6–4.8)
ALBUMIN/GLOB SERPL: 1.4 {RATIO} (ref 1.2–2.2)
ALP SERPL-CCNC: 84 IU/L (ref 39–117)
ALT SERPL-CCNC: 20 IU/L (ref 0–32)
AST SERPL-CCNC: 19 IU/L (ref 0–40)
BILIRUB SERPL-MCNC: 0.5 MG/DL (ref 0–1.2)
BUN SERPL-MCNC: 10 MG/DL (ref 8–27)
BUN/CREAT SERPL: 16 (ref 12–28)
CALCIUM SERPL-MCNC: 10.1 MG/DL (ref 8.7–10.3)
CHLORIDE SERPL-SCNC: 97 MMOL/L (ref 96–106)
CHOLEST SERPL-MCNC: 222 MG/DL (ref 100–199)
CO2 SERPL-SCNC: 28 MMOL/L (ref 20–29)
CREAT SERPL-MCNC: 0.62 MG/DL (ref 0.57–1)
ERYTHROCYTE [DISTWIDTH] IN BLOOD BY AUTOMATED COUNT: 12.9 % (ref 12.3–15.4)
GLOBULIN SER CALC-MCNC: 2.9 G/DL (ref 1.5–4.5)
GLUCOSE SERPL-MCNC: 90 MG/DL (ref 65–99)
HBA1C MFR BLD: 5.8 % (ref 4.8–5.6)
HCT VFR BLD AUTO: 45.5 % (ref 34–46.6)
HDLC SERPL-MCNC: 71 MG/DL
HGB BLD-MCNC: 15.8 G/DL (ref 11.1–15.9)
INTERPRETATION, 910389: NORMAL
LDLC SERPL CALC-MCNC: 135 MG/DL (ref 0–99)
MCH RBC QN AUTO: 29.9 PG (ref 26.6–33)
MCHC RBC AUTO-ENTMCNC: 34.7 G/DL (ref 31.5–35.7)
MCV RBC AUTO: 86 FL (ref 79–97)
PLATELET # BLD AUTO: 251 X10E3/UL (ref 150–450)
POTASSIUM SERPL-SCNC: 3.9 MMOL/L (ref 3.5–5.2)
PROT SERPL-MCNC: 7.1 G/DL (ref 6–8.5)
RBC # BLD AUTO: 5.29 X10E6/UL (ref 3.77–5.28)
SODIUM SERPL-SCNC: 139 MMOL/L (ref 134–144)
TRIGL SERPL-MCNC: 79 MG/DL (ref 0–149)
VLDLC SERPL CALC-MCNC: 16 MG/DL (ref 5–40)
WBC # BLD AUTO: 6.3 X10E3/UL (ref 3.4–10.8)

## 2019-09-27 ENCOUNTER — OFFICE VISIT (OUTPATIENT)
Dept: FAMILY MEDICINE CLINIC | Age: 69
End: 2019-09-27

## 2019-09-27 VITALS
TEMPERATURE: 97.5 F | HEART RATE: 86 BPM | SYSTOLIC BLOOD PRESSURE: 117 MMHG | DIASTOLIC BLOOD PRESSURE: 60 MMHG | RESPIRATION RATE: 18 BRPM | WEIGHT: 159.6 LBS | BODY MASS INDEX: 25.65 KG/M2 | HEIGHT: 66 IN | OXYGEN SATURATION: 97 %

## 2019-09-27 DIAGNOSIS — Z00.00 MEDICARE ANNUAL WELLNESS VISIT, SUBSEQUENT: ICD-10-CM

## 2019-09-27 DIAGNOSIS — F17.210 CIGARETTE NICOTINE DEPENDENCE, UNCOMPLICATED: ICD-10-CM

## 2019-09-27 DIAGNOSIS — H66.011 NON-RECURRENT ACUTE SUPPURATIVE OTITIS MEDIA OF RIGHT EAR WITH SPONTANEOUS RUPTURE OF TYMPANIC MEMBRANE: ICD-10-CM

## 2019-09-27 DIAGNOSIS — I10 ESSENTIAL HYPERTENSION: ICD-10-CM

## 2019-09-27 DIAGNOSIS — J44.9 CHRONIC OBSTRUCTIVE PULMONARY DISEASE, UNSPECIFIED COPD TYPE (HCC): ICD-10-CM

## 2019-09-27 DIAGNOSIS — M51.36 DDD (DEGENERATIVE DISC DISEASE), LUMBAR: ICD-10-CM

## 2019-09-27 DIAGNOSIS — R73.02 IGT (IMPAIRED GLUCOSE TOLERANCE): ICD-10-CM

## 2019-09-27 DIAGNOSIS — I73.9 PVD (PERIPHERAL VASCULAR DISEASE) (HCC): ICD-10-CM

## 2019-09-27 DIAGNOSIS — I25.10 CORONARY ARTERY DISEASE INVOLVING NATIVE CORONARY ARTERY OF NATIVE HEART WITHOUT ANGINA PECTORIS: Primary | ICD-10-CM

## 2019-09-27 DIAGNOSIS — F41.9 ANXIETY: ICD-10-CM

## 2019-09-27 DIAGNOSIS — Z71.89 ACP (ADVANCE CARE PLANNING): ICD-10-CM

## 2019-09-27 DIAGNOSIS — Z23 ENCOUNTER FOR IMMUNIZATION: ICD-10-CM

## 2019-09-27 DIAGNOSIS — E78.2 MIXED HYPERLIPIDEMIA: ICD-10-CM

## 2019-09-27 RX ORDER — AZITHROMYCIN 250 MG/1
TABLET, FILM COATED ORAL
Qty: 6 TAB | Refills: 0 | Status: SHIPPED | OUTPATIENT
Start: 2019-09-27 | End: 2019-10-02

## 2019-09-27 RX ORDER — OFLOXACIN 3 MG/ML
10 SOLUTION AURICULAR (OTIC) 2 TIMES DAILY
Qty: 10 ML | Refills: 0 | Status: SHIPPED | OUTPATIENT
Start: 2019-09-27 | End: 2019-10-07

## 2019-09-27 RX ORDER — HYDROCHLOROTHIAZIDE 25 MG/1
TABLET ORAL
Refills: 11 | COMMUNITY
Start: 2019-09-07

## 2019-09-27 RX ORDER — MORPHINE SULFATE 15 MG/1
15 TABLET, FILM COATED, EXTENDED RELEASE ORAL EVERY 12 HOURS
COMMUNITY
End: 2020-11-11 | Stop reason: SDUPTHER

## 2019-09-27 NOTE — PROGRESS NOTES
[facility] Note    Monika Tran is a 81 y.o. female who was seen in clinic today (9/27/2019). Subjective:  Cardiovascular Review:  The patient has hypertension, hyperlipidemia, CAD s/p stenting and peripheral vascular disease. Seen by cardiology, Dr Jose E Maldonado. Patient underwent nuclear stress test (2017) with normal findings. Diet and Lifestyle: generally follows a low fat low cholesterol diet, generally follows a low sodium diet, smoker 1 ppd  Home BP Monitoring: is not measured at home. Pertinent ROS: taking medications as instructed, no medication side effects noted, no TIA's, no chest pain on exertion, no dyspnea on exertion, no swelling of ankles. Patient refuses to take cholesterol medication as it causes myalgias. COPD Review:  The patient is being seen for follow up of COPD and history of 45 pack years tobacco use. Oxygen: She currently is not on home oxygen therapy. Symptoms: chronic dyspnea: severity = not present: course of sx: stable. Patient uses 1 pillows at night. Patient does smoke cigarettes. Peripheral Vascular Disease  Patient in with known h/o PVD. Patient denies symptoms at present. 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 The patient's risks factors for atherosclerosis include smoking, hypertension, peripheral vascular disease, hypercholesterolemia. She is seen by Dr Jairo Garvey annually. Osteoarthritis and Chronic Pain:  Patient has osteoarthritis and spondylosis, primarily affecting the back and hands. Symptoms onset: problem is longstanding. RA testing is negative. Rheumatological ROS: using narcotic analgesics regularly daily in stable pattern with good pain control and good quality of life. Pain managed by Dr. Asuncion Matute - pain management. Patient had lumbar laminectomy in 8/2015 with Dr. Valeria Baer without improvement. Patient seen by Dr. Genevieve Smallwood in [facility].      Anxiety Review:  Patient is seen for anxiety disorder, panic attacks and claustrophobia. Current treatment includes Xanax TID. Patient on Xanax for 20+ years. Patient previously treated Paxil, Zoloft, Prozac, Wellbutrin and had adverse reaction to medications. Ongoing symptoms include: racing thoughts, psychomotor agitation. Patient denies: sweating, chest pain, shortness of breath, difficulty concentrating, suicidal ideation. Reported side effects from the treatment: none. Ear Pain  Patient complains of right ear pain and right ear drainage . Onset of symptoms was several days ago, unchanged since that time. She also notes decreased hearing right side, drainage right side, moderate pain right side. She does not have a history of ear infections. Prior to Admission medications    Medication Sig Start Date End Date Taking? Authorizing Provider   azithromycin (ZITHROMAX) 250 mg tablet Take 2 tablets today, then take 1 tablet daily 9/27/19 10/2/19 Yes Reny Jarvis NP   ofloxacin (FLOXIN) 0.3 % otic solution Administer 10 Drops in right ear two (2) times a day for 10 days. 9/27/19 10/7/19 Yes Reny Jarvis NP   ALPRAZolam Bryce Kaska) 0.5 mg tablet TAKE 1 TABLET BY MOUTH THREE TIMES DAILY AS NEEDED FOR ANXIETY. MAX DAILY AMOUNT 1.5MG 7/19/19  Yes Reny Jarvis NP   ONETOUCH ULTRA BLUE TEST STRIP strip For glucose testing once daily DX: R73.02 2/12/19  Yes Reny Jarvis NP   gabapentin (NEURONTIN) 300 mg capsule Take 300 mg by mouth three (3) times daily. 3/28/18  Yes Provider, Historical   oxyCODONE IR (ROXICODONE) 10 mg tab immediate release tablet TK 1 TO 2 TS PO TID PRN P 3/4/18  Yes Provider, Historical   Blood Sugar Diagnostic, Drum (ACCU-CHEK COMPACT PLUS TEST) strp For glucose testing once daily as needed. R73.02 3/29/18  Yes Reny Jarvis NP   Blood-Glucose Meter, Drum-type (ACCU-CHEK COMPACT PLUS CARE) kit For glucose testing once daily as needed.  R73.02 3/29/18  Yes Claudia Villalobos NP   Lancets (ACCU-CHEK MULTICLIX LANCET) misc For glucose testing once daily as needed. R73.02 3/29/18  Yes Colonel Junior Jarvis NP   levalbuterol tartrate (XOPENEX) 45 mcg/actuation inhaler Take 1-2 Puffs by inhalation every four (4) hours as needed for Wheezing. 10/21/15  Yes Colonel Junior Jarvis NP   ranitidine (ZANTAC) 150 mg tablet TAKE 1 TABLET BY MOUTH TWICE A DAY FOR ACID REFLUX 10/13/14  Yes Alisha Doe MD   Blood-Glucose Meter monitoring kit For twice a day testing 2/27/14  Yes Colonel Junior Jarvis NP   oxycodone-acetaminophen (PERCOCET)  mg per tablet Take 1 Tab by mouth three (3) times daily. 9/29/10  Yes Provider, Historical   cetirizine (ZYRTEC) 10 mg tablet Take  by mouth daily. Yes Provider, Historical   hydroCHLOROthiazide (HYDRODIURIL) 25 mg tablet TK 1 T PO QD 9/7/19   Provider, Historical   morphine CR (MS CONTIN) 15 mg CR tablet Take 15 mg by mouth. Provider, Historical   OXYCONTIN 10 mg ER tablet TK 1 T PO Q 12 H FOR 30 DAYS 2/7/19   Provider, Historical   baclofen (LIORESAL) 10 mg tablet  2/6/19   Provider, Historical   ACCU-CHEK COMPACT PLUS TEST strp USE TO TEST FOR BLOOD SUGAR TWICE DAILY 7/10/17   Verónica Cool NP   tiZANidine (ZANAFLEX) 2 mg tablet  2/17/16   Provider, Historical   gabapentin (NEURONTIN) 600 mg tablet Take  by mouth three (3) times daily. Provider, Historical          Allergies   Allergen Reactions    Penicillins Rash, Itching and Swelling     Of throat    Prednisone Anaphylaxis    Bactrim [Sulfamethoprim Ds] Rash    Biaxin [Clarithromycin] Rash    Ciprofloxacin Rash    Ivp Dye [Fd And C Blue No.1] Rash    Keflex [Cephalexin] Rash    Nickel Contact Dermatitis    Nsaids (Non-Steroidal Anti-Inflammatory Drug) Rash    Simvastatin Myalgia    Tetracycline Rash           ROS  See HPI    Objective:   Physical Exam   Constitutional: She is oriented to person, place, and time. She appears well-developed and well-nourished. HENT:   Right Ear:  There is drainage. Tympanic membrane is perforated and erythematous. Neck: Normal range of motion. Neck supple. No JVD present. Carotid bruit is present (left). No thyromegaly present. Cardiovascular: Normal rate, regular rhythm and intact distal pulses. Exam reveals no gallop and no friction rub. Murmur heard. Pulmonary/Chest: Effort normal and breath sounds normal. No respiratory distress. Musculoskeletal: She exhibits no edema. Lymphadenopathy:     She has no cervical adenopathy. Neurological: She is alert and oriented to person, place, and time. Psychiatric: She has a normal mood and affect. Her behavior is normal.   Nursing note and vitals reviewed. Visit Vitals  /60 (BP 1 Location: Right arm, BP Patient Position: Sitting)   Pulse 86   Temp 97.5 °F (36.4 °C) (Oral)   Resp 18   Ht 5' 6\" (1.676 m)   Wt 159 lb 9.6 oz (72.4 kg)   SpO2 97%   BMI 25.76 kg/m²       Assessment & Plan:  Diagnoses and all orders for this visit:    1. Coronary artery disease involving native coronary artery of native heart without angina pectoris  Managed by cardiology, no changes. 2. Chronic obstructive pulmonary disease, unspecified COPD type (HCC)  Stable, no changes to current therapy    3. Cigarette nicotine dependence, uncomplicated   Counseled patient on need to quit smoking. Discussed low dose CT as lung cancer screening.   -     CT LOW DOSE LUNG CANCER SCREENING; Future    4. Mixed hyperlipidemia  Intolerant of statins.   -     LIPID PANEL    5. Essential hypertension  Well controlled, no medication changes. -     CBC W/O DIFF  -     METABOLIC PANEL, COMPREHENSIVE  -     TSH AND FREE T4    6. IGT (impaired glucose tolerance)  -     GLUCOSE TOLERANCE (4 SP BLOOD)  -     HEMOGLOBIN A1C W/O EAG    7. PVD (peripheral vascular disease) ([facility] [address]  Managed by cardiology, no changes    8. Anxiety  Stable on Xanax.      9. Non-recurrent acute suppurative otitis media of right ear with spontaneous rupture of tympanic membrane  Referral to ENT for continued symptoms. -     azithromycin (ZITHROMAX) 250 mg tablet; Take 2 tablets today, then take 1 tablet daily  -     ofloxacin (FLOXIN) 0.3 % otic solution; Administer 10 Drops in right ear two (2) times a day for 10 days. 10. DDD (degenerative disc disease), lumbar  Managed by pain management and orthopedics. 11. Medicare annual wellness visit, subsequent    12. ACP (advance care planning)    13. Encounter for immunization  -     INFLUENZA VACCINE INACTIVATED (IIV), SUBUNIT, ADJUVANTED, IM  -     ADMIN INFLUENZA VIRUS VAC        I have discussed the diagnosis with the patient and the intended plan as seen in the above orders. The patient has received an after-visit summary along with patient information handout. I have discussed medication side effects and warnings with the patient as well. Follow-up and Dispositions    · Return in about 6 months (around 3/27/2020) for disease management.            Michael Sainz NP

## 2019-09-27 NOTE — PROGRESS NOTES
Chief Complaint   Patient presents with   [facility] Annual Wellness Visit     labs done 2019, please discuss results    Hypertension    Cholesterol Problem       Reviewed Record in preparation for visit and have obtained necessary documentation. Identified pt with two pt identifiers (Name @ )    Health Maintenance Due   Topic    GLAUCOMA SCREENING Q2Y     BREAST CANCER SCRN MAMMOGRAM     Pneumococcal 65+ years (2 of 2 - PPSV23)    Influenza Age 5 to Adult     MEDICARE YEARLY EXAM          1. Have you been to the ER, urgent care clinic since your last visit? Hospitalized since your last visit? No      2. Have you seen or consulted any other health care providers outside of the [address] [place] [address] since your last visit? Include any pap smears or colon screening.  no

## 2019-09-27 NOTE — PROGRESS NOTES
This is the Subsequent Medicare Annual Wellness Exam, performed 12 months or more after the Initial AWV or the last Subsequent AWV    I have reviewed the patient's medical history in detail and updated the computerized patient record. History     Past Medical History:   Diagnosis Date    Allergic rhinitis     Anxiety     CAD (coronary artery disease) 2011    Dr Rafaela Rutherford Chronic low back pain     COPD [facility]ch[facility] obstructive pulmonary disease) ([facility] [address]     Ganglion cyst     GERD (gastroesophageal reflux disease)     Goiter, nodular     Hyperlipidemia     Occlusion and stenosis of right vertebral artery     Pelvic fracture ([facility] [address] 2004    Squamous cell carcinoma of scalp     Subclavian artery stenosis, left (HCC)     Tobacco abuse       Past Surgical History:   Procedure Laterality Date    HX BACK SURGERY  2015    lower    HX CARPAL TUNNEL RELEASE      HX CATARACT REMOVAL      both eyes    HX CYST REMOVAL      HX HEART CATHETERIZATION  2011    SHOULDER SURG PROC UNLISTED      left shoulder     Current Outpatient Medications   Medication Sig Dispense Refill    ALPRAZolam (XANAX) 0.5 mg tablet TAKE 1 TABLET BY MOUTH THREE TIMES DAILY AS NEEDED FOR ANXIETY. MAX DAILY AMOUNT 1.5MG 90 Tab 5    OXYCONTIN 10 mg ER tablet TK 1 T PO Q 12 H FOR 30 DAYS  0    baclofen (LIORESAL) 10 mg tablet   2    ONETOUCH ULTRA BLUE TEST STRIP strip For glucose testing once daily DX: R73.02 100 Strip 5    gabapentin (NEURONTIN) 300 mg capsule       oxyCODONE IR (ROXICODONE) 10 mg tab immediate release tablet TK 1 TO 2 TS PO TID PRN P  0    Blood Sugar Diagnostic, Drum (ACCU-CHEK COMPACT PLUS TEST) strp For glucose testing once daily as needed. R73.02 100 Strip 3    Blood-Glucose Meter, Drum-type (ACCU-CHEK COMPACT PLUS CARE) kit For glucose testing once daily as needed. R73.02 1 Kit 0    Lancets (ACCU-CHEK MULTICLIX LANCET) misc For glucose testing once daily as needed.  R73.02 100 Each 3    ACCU-CHEK COMPACT PLUS TEST strp USE TO TEST FOR BLOOD SUGAR TWICE DAILY 102 Strip 5    tiZANidine (ZANAFLEX) 2 mg tablet   1    levalbuterol tartrate (XOPENEX) 45 mcg/actuation inhaler Take 1-2 Puffs by inhalation every four (4) hours as needed for Wheezing. 1 Inhaler 2    gabapentin (NEURONTIN) 600 mg tablet Take  by mouth three (3) times daily.  ranitidine (ZANTAC) 150 mg tablet TAKE 1 TABLET BY MOUTH TWICE A DAY FOR ACID REFLUX 180 tablet 3    Blood-Glucose Meter monitoring kit For twice a day testing 1 Kit 0    oxycodone-acetaminophen (PERCOCET)  mg per tablet Take 1 Tab by mouth three (3) times daily.  cetirizine (ZYRTEC) 10 mg tablet Take  by mouth daily.        Allergies   Allergen Reactions    Penicillins Rash, Itching and Swelling     Of throat    Prednisone Anaphylaxis    Bactrim [Sulfamethoprim Ds] Rash    Biaxin [Clarithromycin] Rash    Ciprofloxacin Rash    Ivp Dye [Fd And C Blue No.1] Rash    Keflex [Cephalexin] Rash    Nickel Contact Dermatitis    Nsaids (Non-Steroidal Anti-Inflammatory Drug) Rash    Simvastatin Myalgia    Tetracycline Rash     Family History   Problem Relation Age of Onset    Heart Disease Mother     Heart Disease Father     Lung Disease Father      Social History     Tobacco Use    Smoking status: Current Every Day Smoker     Packs/day: 1.00     Years: 30.00     Pack years: 30.00     Types: Cigarettes    Smokeless tobacco: Never Used   Substance Use Topics    Alcohol use: No     Alcohol/week: 0.8 standard drinks     Types: 1 Standard drinks or equivalent per week     Patient Active Problem List   Diagnosis Code    Tobacco abuse Z72.0    COPD (chronic obstructive pulmonary disease) ([facility]) J44.9    Hyperlipidemia E78.5    PVD (peripheral vascular disease) ([facility]) I73.9    CAD (coronary artery disease) I25.10    Anxiety F41.9    DDD (degenerative disc disease), lumbar M51.36       Depression Risk Factor Screening:     3 most recent PHQ Screens 9/27/2019   Little interest or pleasure in doing things Not at all   Feeling down, depressed, irritable, or hopeless Not at all   Total Score PHQ 2 0     Alcohol Risk Factor Screening: You do not drink alcohol or very rarely. Functional Ability and Level of Safety:   Hearing Loss  Hearing is good. Activities of Daily Living  The home contains: no safety equipment. Patient does total self care    Fall Risk  Fall Risk Assessment, last 12 mths 9/27/2019   Able to walk? Yes   Fall in past 12 months? No       Abuse Screen  Patient is not abused    Cognitive Screening   Evaluation of Cognitive Function:  Has your family/caregiver stated any concerns about your memory: no  Normal    Patient Care Team   Patient Care Team:  Vidal Luciano NP as PCP - General (Nurse Practitioner)  Eboni Lang MD (General Surgery)  Guillermo Hicks MD (Orthopedic Surgery)  Colin Walton MD (Endocrinology)  Berta Diaz MD (Cardiology)    Assessment/Plan   Education and counseling provided:  Are appropriate based on today's review and evaluation    Diagnoses and all orders for this visit:    1. Coronary artery disease involving native coronary artery of native heart without angina pectoris    2. Chronic obstructive pulmonary disease, unspecified COPD type ([address]    3. Tobacco abuse    4. Mixed hyperlipidemia    5. PVD (peripheral vascular disease) ([address]    6. Anxiety    7. DDD (degenerative disc disease), lumbar    8. Medicare annual wellness visit, subsequent    9.  ACP (advance care planning)        Health Maintenance Due   Topic Date Due    GLAUCOMA SCREENING Q2Y  05/13/2017    BREAST CANCER SCRN MAMMOGRAM  03/31/2018    Pneumococcal 65+ years (2 of 2 - PPSV23) 07/24/2018    Influenza Age 9 to Adult  08/01/2019    MEDICARE YEARLY EXAM  08/03/2019

## 2020-03-17 ENCOUNTER — HOSPITAL ENCOUNTER (OUTPATIENT)
Dept: LAB | Age: 70
Discharge: HOME | End: 2020-03-17
Payer: MEDICARE

## 2020-03-17 PROCEDURE — 80053 COMPREHEN METABOLIC PANEL: CPT

## 2020-03-17 PROCEDURE — 84443 ASSAY THYROID STIM HORMONE: CPT

## 2020-03-17 PROCEDURE — 80061 LIPID PANEL: CPT

## 2020-03-17 PROCEDURE — 83036 HEMOGLOBIN GLYCOSYLATED A1C: CPT

## 2020-03-17 PROCEDURE — 85027 COMPLETE CBC AUTOMATED: CPT

## 2020-03-17 PROCEDURE — 36415 COLL VENOUS BLD VENIPUNCTURE: CPT

## 2020-03-18 LAB
ALBUMIN SERPL-MCNC: 4.2 G/DL (ref 3.8–4.8)
ALBUMIN/GLOB SERPL: 1.2 {RATIO} (ref 1.2–2.2)
ALP SERPL-CCNC: 92 IU/L (ref 39–117)
ALT SERPL-CCNC: 22 IU/L (ref 0–32)
AST SERPL-CCNC: 24 IU/L (ref 0–40)
BILIRUB SERPL-MCNC: 0.5 MG/DL (ref 0–1.2)
BUN SERPL-MCNC: 8 MG/DL (ref 8–27)
BUN/CREAT SERPL: 10 (ref 12–28)
CALCIUM SERPL-MCNC: 9.9 MG/DL (ref 8.7–10.3)
CHLORIDE SERPL-SCNC: 100 MMOL/L (ref 96–106)
CHOLEST SERPL-MCNC: 242 MG/DL (ref 100–199)
CO2 SERPL-SCNC: 26 MMOL/L (ref 20–29)
CREAT SERPL-MCNC: 0.78 MG/DL (ref 0.57–1)
ERYTHROCYTE [DISTWIDTH] IN BLOOD BY AUTOMATED COUNT: 13.2 % (ref 11.7–15.4)
GLOBULIN SER CALC-MCNC: 3.4 G/DL (ref 1.5–4.5)
GLUCOSE SERPL-MCNC: 89 MG/DL (ref 65–99)
HBA1C MFR BLD: 5.7 % (ref 4.8–5.6)
HCT VFR BLD AUTO: 50.5 % (ref 34–46.6)
HDLC SERPL-MCNC: 81 MG/DL
HGB BLD-MCNC: 16.5 G/DL (ref 11.1–15.9)
INTERPRETATION, 910389: NORMAL
LDLC SERPL CALC-MCNC: 144 MG/DL (ref 0–99)
MCH RBC QN AUTO: 29.7 PG (ref 26.6–33)
MCHC RBC AUTO-ENTMCNC: 32.7 G/DL (ref 31.5–35.7)
MCV RBC AUTO: 91 FL (ref 79–97)
PLATELET # BLD AUTO: 272 X10E3/UL (ref 150–450)
POTASSIUM SERPL-SCNC: 4.2 MMOL/L (ref 3.5–5.2)
PROT SERPL-MCNC: 7.6 G/DL (ref 6–8.5)
RBC # BLD AUTO: 5.55 X10E6/UL (ref 3.77–5.28)
SODIUM SERPL-SCNC: 141 MMOL/L (ref 134–144)
T4 FREE SERPL-MCNC: 1.46 NG/DL (ref 0.82–1.77)
TRIGL SERPL-MCNC: 86 MG/DL (ref 0–149)
TSH SERPL DL<=0.005 MIU/L-ACNC: 1.37 UIU/ML (ref 0.45–4.5)
VLDLC SERPL CALC-MCNC: 17 MG/DL (ref 5–40)
WBC # BLD AUTO: 6.4 X10E3/UL (ref 3.4–10.8)

## 2020-03-30 ENCOUNTER — TELEPHONE (OUTPATIENT)
Dept: FAMILY MEDICINE CLINIC | Age: 70
End: 2020-03-30

## 2020-03-30 NOTE — TELEPHONE ENCOUNTER
Called patient and she states is having issues with her right ear again. Started last night with draining pus. She states she washed her hair yesterday and got water in her ear and that causes it. Has a busted ear drum from yrs past. Denies pain or fever. rx pended if approved.

## 2020-03-30 NOTE — TELEPHONE ENCOUNTER
----- Message from Colton Kay sent at 3/30/2020  8:25 AM EDT -----  Regarding: NP Simona/Refill  Medication Refill    Caller (if not patient): n/a      Relationship of caller (if not patient): n/a      Best contact number(s): [phone]    Name of medication and dosage if known: \"Ofloxacin\" 0.3 oph Solution, 5mg    Is patient out of this medication (yes/no): yes    Pharmacy name: [facility] listed in chart? (yes/no): yes    Pharmacy phone number: n/a    Details to clarify the request:  This is for her ear infection. No other symptoms.       Colton Kay

## 2020-03-31 RX ORDER — OFLOXACIN 3 MG/ML
SOLUTION/ DROPS OPHTHALMIC
Qty: 10 ML | Refills: 0 | Status: SHIPPED | OUTPATIENT
Start: 2020-03-31 | End: 2020-11-03

## 2020-06-19 ENCOUNTER — TELEPHONE (OUTPATIENT)
Dept: FAMILY MEDICINE CLINIC | Age: 70
End: 2020-06-19

## 2020-06-19 NOTE — TELEPHONE ENCOUNTER
Call placed to patient. No answer left message for patient to return call to office regarding to upcoming appt.  Patient needs to reschedule or change to VV

## 2020-06-25 ENCOUNTER — VIRTUAL VISIT (OUTPATIENT)
Dept: FAMILY MEDICINE CLINIC | Age: 70
End: 2020-06-25

## 2020-06-25 DIAGNOSIS — I73.9 PVD (PERIPHERAL VASCULAR DISEASE) (HCC): ICD-10-CM

## 2020-06-25 DIAGNOSIS — J44.9 CHRONIC OBSTRUCTIVE PULMONARY DISEASE, UNSPECIFIED COPD TYPE (HCC): ICD-10-CM

## 2020-06-25 DIAGNOSIS — E78.2 MIXED HYPERLIPIDEMIA: Primary | ICD-10-CM

## 2020-06-25 DIAGNOSIS — M51.36 DDD (DEGENERATIVE DISC DISEASE), LUMBAR: ICD-10-CM

## 2020-06-25 DIAGNOSIS — F41.9 ANXIETY: ICD-10-CM

## 2020-06-25 DIAGNOSIS — I10 ESSENTIAL HYPERTENSION: ICD-10-CM

## 2020-06-25 DIAGNOSIS — I25.10 CORONARY ARTERY DISEASE INVOLVING NATIVE CORONARY ARTERY OF NATIVE HEART WITHOUT ANGINA PECTORIS: ICD-10-CM

## 2020-06-25 DIAGNOSIS — Z72.0 TOBACCO ABUSE: ICD-10-CM

## 2020-06-25 DIAGNOSIS — R73.03 PRE-DIABETES: ICD-10-CM

## 2020-06-25 RX ORDER — ALPRAZOLAM 0.5 MG/1
TABLET ORAL
Qty: 90 TAB | Refills: 5 | Status: SHIPPED | OUTPATIENT
Start: 2020-06-25 | End: 2020-12-14 | Stop reason: SDUPTHER

## 2020-06-25 RX ORDER — ROSUVASTATIN CALCIUM 5 MG/1
5 TABLET, COATED ORAL
Qty: 90 TAB | Refills: 1 | Status: SHIPPED | OUTPATIENT
Start: 2020-06-25 | End: 2020-11-03

## 2020-06-26 NOTE — PROGRESS NOTES
[facility] Note      Raleigh Gabriel is a 78 y.o. female who was seen by synchronous (real-time) audio-video technology on 6/25/2020. Consent: Raleigh Gabriel, who was seen by synchronous (real-time) audio-video technology, and/or her healthcare decision maker, is aware that this patient-initiated, Telehealth encounter on 6/25/2020 is a billable service, with coverage as determined by her insurance carrier. She is aware that she may receive a bill and has provided verbal consent to proceed: Yes. Assessment & Plan:   Diagnoses and all orders for this visit:    1. Mixed hyperlipidemia  Remains elevated. Patient agrees to try low dose statin. -     Begin rosuvastatin (CRESTOR) 5 mg tablet; Take 1 Tab by mouth nightly. For cholesterol  -     METABOLIC PANEL, COMPREHENSIVE  -     LIPID PANEL    2. Chronic obstructive pulmonary disease, unspecified COPD type [facility])  Request pulmonology evaluation and treatment.   -     REFERRAL TO PULMONARY DISEASE    3. Tobacco abuse  Counseled patient on need to quit smoking. 4. Essential hypertension  Stable, no changes to current therapy    5. PVD (peripheral vascular disease) ([facility] [address]  Needs improved control of risk factors. Add statin. Counseled patient on need to quit smoking. 6. Anxiety  Stable, no changes to current therapy  -     ALPRAZolam (XANAX) 0.5 mg tablet; TAKE 1 TABLET BY MOUTH THREE TIMES DAILY AS NEEDED FOR ANXIETY. MAX DAILY AMOUNT 1.5MG    7. Coronary artery disease involving native coronary artery of native heart without angina pectoris  Needs improved control of risk factors. Add statin. Counseled patient on need to quit smoking. 8. DDD (degenerative disc disease), lumbar  Managed by pain management. 9. Pre-diabetes  -     HEMOGLOBIN A1C W/O EAG    712  Subjective:    Raleigh Gabriel is a 78 y.o. female who was seen for Follow Up Chronic Condition    Cardiovascular Review:  The patient has hypertension, hyperlipidemia, CAD s/p stenting and peripheral vascular disease. Seen by cardiology, Dr Joey Kenney. Patient underwent nuclear stress test (2017) with normal findings. Diet and Lifestyle: generally follows a low fat low cholesterol diet, generally follows a low sodium diet, smoker 1 ppd  Home BP Monitoring: is not measured at home.     Pertinent ROS: taking medications as instructed, no medication side effects noted, no TIA's, no chest pain on exertion, no dyspnea on exertion, no swelling of ankles. Patient refuses to take cholesterol medication as it causes myalgias.      COPD Review:  The patient is being seen for follow up of COPD and history of 50 pack years tobacco use. Oxygen: She currently is not on home oxygen therapy. Symptoms: chronic dyspnea: severity = not present: course of sx: stable. Patient uses 1 pillows at night. Patient does smoke cigarettes.     Peripheral Vascular Disease  Patient in with known h/o PVD. Patient denies symptoms at present. 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 The patient's risks factors for atherosclerosis include smoking, hypertension, peripheral vascular disease, hypercholesterolemia. She is seen by Dr Madeleine Ortiz annually.      Osteoarthritis and Chronic Pain:  Patient has osteoarthritis and spondylosis, primarily affecting the back and hands. Symptoms onset: problem is longstanding. RA testing is negative. Rheumatological ROS: using narcotic analgesics regularly daily in stable pattern with good pain control and good quality of life. Pain managed by Dr. Patel Yepez - pain management. Patient had lumbar laminectomy in 8/2015 with Dr. Poppy Shukla without improvement. Patient seen by Dr. Ebenezer Crocker in [facility].      Anxiety Review:  Patient is seen for anxiety disorder, panic attacks and claustrophobia. Current treatment includes Xanax TID. Patient on Xanax for 20+ years.  Patient previously treated Paxil, Zoloft, Prozac, Wellbutrin and had adverse reaction to medications. Ongoing symptoms include: racing thoughts, psychomotor agitation. Patient denies: sweating, chest pain, shortness of breath, difficulty concentrating, suicidal ideation. Reported side effects from the treatment: none. Prior to Admission medications    Medication Sig Start Date End Date Taking? Authorizing Provider   rosuvastatin (CRESTOR) 5 mg tablet Take 1 Tab by mouth nightly. For cholesterol 6/25/20  Yes Jamia Jarvis NP   ALPRAZolam (XANAX) 0.5 mg tablet TAKE 1 TABLET BY MOUTH THREE TIMES DAILY AS NEEDED FOR ANXIETY. MAX DAILY AMOUNT 1.5MG 6/25/20  Yes Jamia Jarvis NP   ofloxacin (FLOXIN) 0.3 % ophthalmic solution Administer 10 gtts in right ear two times daily x 10 days 3/31/20   Jennifer Walton NP   hydroCHLOROthiazide (HYDRODIURIL) 25 mg tablet TK 1 T PO QD 9/7/19   Provider, Historical   morphine CR (MS CONTIN) 15 mg CR tablet Take 15 mg by mouth. Provider, Historical   OXYCONTIN 10 mg ER tablet TK 1 T PO Q 12 H FOR 30 DAYS 2/7/19   Provider, Historical   baclofen (LIORESAL) 10 mg tablet  2/6/19   Provider, Historical   ONETOUCH ULTRA BLUE TEST STRIP strip For glucose testing once daily DX: R73.02 2/12/19   Jennifer Walton NP   gabapentin (NEURONTIN) 300 mg capsule Take 300 mg by mouth three (3) times daily. 3/28/18   Provider, Historical   oxyCODONE IR (ROXICODONE) 10 mg tab immediate release tablet TK 1 TO 2 TS PO TID PRN P 3/4/18   Provider, Historical   Blood Sugar Diagnostic, Drum (ACCU-CHEK COMPACT PLUS TEST) strp For glucose testing once daily as needed. R73.02 3/29/18   Jennifer Walton NP   Blood-Glucose Meter, Drum-type (ACCU-CHEK COMPACT PLUS CARE) kit For glucose testing once daily as needed. R73.02 3/29/18   Jennifer Walton NP   Lancets (ACCU-CHEK MULTICLIX LANCET) misc For glucose testing once daily as needed.  R73.02 3/29/18   Jennifer Walton NP   ACCU-CHEK COMPACT PLUS TEST strp USE TO TEST FOR BLOOD SUGAR TWICE DAILY 7/10/17   Tommy Rose NP   tiZANidine (ZANAFLEX) 2 mg tablet  2/17/16   Provider, Historical   levalbuterol tartrate (XOPENEX) 45 mcg/actuation inhaler Take 1-2 Puffs by inhalation every four (4) hours as needed for Wheezing. 10/21/15   Tommy Rose NP   gabapentin (NEURONTIN) 600 mg tablet Take  by mouth three (3) times daily. Provider, Historical   ranitidine (ZANTAC) 150 mg tablet TAKE 1 TABLET BY MOUTH TWICE A DAY FOR ACID REFLUX 10/13/14   London Donaldson MD   Blood-Glucose Meter monitoring kit For twice a day testing 2/27/14   Tommy Rose NP   oxycodone-acetaminophen (PERCOCET)  mg per tablet Take 1 Tab by mouth three (3) times daily. 9/29/10   Provider, Historical   cetirizine (ZYRTEC) 10 mg tablet Take  by mouth daily. Provider, Historical     Allergies   Allergen Reactions    Penicillins Rash, Itching and Swelling     Of throat    Prednisone Anaphylaxis    Bactrim [Sulfamethoprim Ds] Rash    Biaxin [Clarithromycin] Rash    Ciprofloxacin Rash    Ivp Dye [Fd And C Blue No.1] Rash    Keflex [Cephalexin] Rash    Nickel Contact Dermatitis    Nsaids (Non-Steroidal Anti-Inflammatory Drug) Rash    Simvastatin Myalgia    Tetracycline Rash       Past Surgical History:   Procedure Laterality Date    HX BACK SURGERY  2015    lower    HX CARPAL TUNNEL RELEASE      HX CATARACT REMOVAL      both eyes    HX CYST REMOVAL      HX HEART CATHETERIZATION  2011    SHOULDER SURG PROC UNLISTED      left shoulder     Family History   Problem Relation Age of Onset    Heart Disease Mother     Heart Disease Father     Lung Disease Father        ROS  SEE HPI    Objective: There were no vitals taken for this visit.    General: alert, cooperative, no distress   Mental  status: normal mood, behavior, speech, dress, motor activity, and thought processes, able to follow commands   HENT: NCAT   Neck: no visualized mass   Resp: no respiratory distress   Neuro: no gross deficits   Skin: no discoloration or lesions of concern on visible areas   Psychiatric: normal affect, consistent with stated mood, no evidence of hallucinations     We discussed the expected course, resolution and complications of the diagnosis(es) in detail. Medication risks, benefits, costs, interactions, and alternatives were discussed as indicated. I advised her to contact the office if her condition worsens, changes or fails to improve as anticipated. She expressed understanding with the diagnosis(es) and plan. Jaguar Myers is a 78 y.o. female who was evaluated by a video visit encounter for concerns as above. Patient identification was verified prior to start of the visit. A caregiver was present when appropriate. Due to this being a TeleHealth encounter (During [facility]- public [facility] emergency), evaluation of the following organ systems was limited: Vitals/Constitutional/EENT/Resp/CV/GI//MS/Neuro/Skin/Heme-Lymph-Imm. Pursuant to the emergency declaration under the [facility], 1135 waiver authority and the [employer] Act, this Virtual  Visit was conducted, with patient's (and/or legal guardian's) consent, to reduce the patient's risk of exposure to COVID-19 and provide necessary medical care. Services were provided through a video synchronous discussion virtually to substitute for in-person clinic visit. Patient and provider were located at their individual homes.       Adam Dan NP

## 2020-11-06 ENCOUNTER — HOSPITAL ENCOUNTER (OUTPATIENT)
Dept: LAB | Age: 70
Discharge: HOME | End: 2020-11-06
Payer: MEDICARE

## 2020-11-06 PROCEDURE — 36415 COLL VENOUS BLD VENIPUNCTURE: CPT

## 2020-11-06 PROCEDURE — 83036 HEMOGLOBIN GLYCOSYLATED A1C: CPT

## 2020-11-06 PROCEDURE — 80053 COMPREHEN METABOLIC PANEL: CPT

## 2020-11-06 PROCEDURE — 80061 LIPID PANEL: CPT

## 2020-11-07 LAB
ALBUMIN SERPL-MCNC: 4.3 G/DL (ref 3.8–4.8)
ALBUMIN/GLOB SERPL: 1.5 {RATIO} (ref 1.2–2.2)
ALP SERPL-CCNC: 102 IU/L (ref 39–117)
ALT SERPL-CCNC: 18 IU/L (ref 0–32)
AST SERPL-CCNC: 18 IU/L (ref 0–40)
BILIRUB SERPL-MCNC: 0.4 MG/DL (ref 0–1.2)
BUN SERPL-MCNC: 8 MG/DL (ref 8–27)
BUN/CREAT SERPL: 12 (ref 12–28)
CALCIUM SERPL-MCNC: 9.8 MG/DL (ref 8.7–10.3)
CHLORIDE SERPL-SCNC: 99 MMOL/L (ref 96–106)
CHOLEST SERPL-MCNC: 211 MG/DL (ref 100–199)
CO2 SERPL-SCNC: 26 MMOL/L (ref 20–29)
CREAT SERPL-MCNC: 0.68 MG/DL (ref 0.57–1)
GLOBULIN SER CALC-MCNC: 2.9 G/DL (ref 1.5–4.5)
GLUCOSE SERPL-MCNC: 94 MG/DL (ref 65–99)
HBA1C MFR BLD: 5.7 % (ref 4.8–5.6)
HDLC SERPL-MCNC: 67 MG/DL
INTERPRETATION, 910389: NORMAL
LDLC SERPL CALC-MCNC: 129 MG/DL (ref 0–99)
POTASSIUM SERPL-SCNC: 4.5 MMOL/L (ref 3.5–5.2)
PROT SERPL-MCNC: 7.2 G/DL (ref 6–8.5)
SODIUM SERPL-SCNC: 137 MMOL/L (ref 134–144)
TRIGL SERPL-MCNC: 87 MG/DL (ref 0–149)
VLDLC SERPL CALC-MCNC: 15 MG/DL (ref 5–40)

## 2020-11-10 ENCOUNTER — HOSPITAL ENCOUNTER (OUTPATIENT)
Dept: PREADMISSION TESTING | Age: 70
Discharge: HOME | End: 2020-11-10
Payer: MEDICARE

## 2020-11-10 LAB
ALBUMIN SERPL-MCNC: 3.9 G/DL (ref 3.4–5)
ALBUMIN/GLOB SERPL: 0.9 {RATIO} (ref 0.8–1.7)
ALP SERPL-CCNC: 109 U/L (ref 45–117)
ALT SERPL-CCNC: 32 U/L (ref 13–56)
ANION GAP SERPL CALC-SCNC: 7 MMOL/L (ref 3–18)
APTT PPP: 30.3 SEC (ref 23–36.4)
AST SERPL-CCNC: 19 U/L (ref 10–38)
ATRIAL RATE: 88 BPM
BASOPHILS # BLD: 0 K/UL (ref 0–0.1)
BASOPHILS NFR BLD: 0 % (ref 0–2)
BILIRUB SERPL-MCNC: 0.6 MG/DL (ref 0.2–1)
BUN SERPL-MCNC: 11 MG/DL (ref 7–18)
BUN/CREAT SERPL: 14 (ref 12–20)
CALCIUM SERPL-MCNC: 10 MG/DL (ref 8.5–10.1)
CALCULATED P AXIS, ECG09: 78 DEGREES
CALCULATED R AXIS, ECG10: 42 DEGREES
CALCULATED T AXIS, ECG11: 73 DEGREES
CHLORIDE SERPL-SCNC: 99 MMOL/L (ref 100–111)
CO2 SERPL-SCNC: 30 MMOL/L (ref 21–32)
CREAT SERPL-MCNC: 0.78 MG/DL (ref 0.6–1.3)
DIAGNOSIS, 93000: NORMAL
DIFFERENTIAL METHOD BLD: ABNORMAL
EOSINOPHIL # BLD: 0 K/UL (ref 0–0.4)
EOSINOPHIL NFR BLD: 0 % (ref 0–5)
ERYTHROCYTE [DISTWIDTH] IN BLOOD BY AUTOMATED COUNT: 13.5 % (ref 11.6–14.5)
EST. AVERAGE GLUCOSE BLD GHB EST-MCNC: 111 MG/DL
GLOBULIN SER CALC-MCNC: 4.3 G/DL (ref 2–4)
GLUCOSE SERPL-MCNC: 115 MG/DL (ref 74–99)
HBA1C MFR BLD: 5.5 % (ref 4.2–5.6)
HCT VFR BLD AUTO: 49 % (ref 35–45)
HGB BLD-MCNC: 17 G/DL (ref 12–16)
INR PPP: 1.1 (ref 0.8–1.2)
LYMPHOCYTES # BLD: 1.4 K/UL (ref 0.9–3.6)
LYMPHOCYTES NFR BLD: 16 % (ref 21–52)
MCH RBC QN AUTO: 30.5 PG (ref 24–34)
MCHC RBC AUTO-ENTMCNC: 34.7 G/DL (ref 31–37)
MCV RBC AUTO: 88 FL (ref 74–97)
MONOCYTES # BLD: 0.5 K/UL (ref 0.05–1.2)
MONOCYTES NFR BLD: 6 % (ref 3–10)
NEUTS SEG # BLD: 6.6 K/UL (ref 1.8–8)
NEUTS SEG NFR BLD: 78 % (ref 40–73)
P-R INTERVAL, ECG05: 142 MS
PLATELET # BLD AUTO: 243 K/UL (ref 135–420)
PMV BLD AUTO: 9.6 FL (ref 9.2–11.8)
POTASSIUM SERPL-SCNC: 3.6 MMOL/L (ref 3.5–5.5)
PROT SERPL-MCNC: 8.2 G/DL (ref 6.4–8.2)
PROTHROMBIN TIME: 13.9 SEC (ref 11.5–15.2)
Q-T INTERVAL, ECG07: 358 MS
QRS DURATION, ECG06: 76 MS
QTC CALCULATION (BEZET), ECG08: 433 MS
RBC # BLD AUTO: 5.57 M/UL (ref 4.2–5.3)
SODIUM SERPL-SCNC: 136 MMOL/L (ref 136–145)
VENTRICULAR RATE, ECG03: 88 BPM
WBC # BLD AUTO: 8.5 K/UL (ref 4.6–13.2)

## 2020-11-10 PROCEDURE — 85025 COMPLETE CBC W/AUTO DIFF WBC: CPT

## 2020-11-10 PROCEDURE — 83036 HEMOGLOBIN GLYCOSYLATED A1C: CPT

## 2020-11-10 PROCEDURE — 80053 COMPREHEN METABOLIC PANEL: CPT

## 2020-11-10 PROCEDURE — 36415 COLL VENOUS BLD VENIPUNCTURE: CPT

## 2020-11-10 PROCEDURE — 85610 PROTHROMBIN TIME: CPT

## 2020-11-10 PROCEDURE — 93005 ELECTROCARDIOGRAM TRACING: CPT

## 2020-11-10 PROCEDURE — 87635 SARS-COV-2 COVID-19 AMP PRB: CPT

## 2020-11-10 PROCEDURE — 85730 THROMBOPLASTIN TIME PARTIAL: CPT

## 2020-11-11 ENCOUNTER — OFFICE VISIT (OUTPATIENT)
Dept: FAMILY MEDICINE CLINIC | Age: 70
End: 2020-11-11
Payer: MEDICARE

## 2020-11-11 VITALS
DIASTOLIC BLOOD PRESSURE: 74 MMHG | TEMPERATURE: 98.8 F | WEIGHT: 175.4 LBS | HEIGHT: 66 IN | BODY MASS INDEX: 28.19 KG/M2 | SYSTOLIC BLOOD PRESSURE: 133 MMHG | RESPIRATION RATE: 16 BRPM | OXYGEN SATURATION: 95 % | HEART RATE: 97 BPM

## 2020-11-11 DIAGNOSIS — J44.9 CHRONIC OBSTRUCTIVE PULMONARY DISEASE, UNSPECIFIED COPD TYPE (HCC): ICD-10-CM

## 2020-11-11 DIAGNOSIS — I73.9 PVD (PERIPHERAL VASCULAR DISEASE) (HCC): ICD-10-CM

## 2020-11-11 DIAGNOSIS — M51.36 DDD (DEGENERATIVE DISC DISEASE), LUMBAR: ICD-10-CM

## 2020-11-11 DIAGNOSIS — F41.9 ANXIETY: ICD-10-CM

## 2020-11-11 DIAGNOSIS — Z79.891 CHRONIC PRESCRIPTION OPIATE USE: ICD-10-CM

## 2020-11-11 DIAGNOSIS — E78.2 MIXED HYPERLIPIDEMIA: ICD-10-CM

## 2020-11-11 DIAGNOSIS — I25.10 CORONARY ARTERY DISEASE INVOLVING NATIVE CORONARY ARTERY OF NATIVE HEART WITHOUT ANGINA PECTORIS: ICD-10-CM

## 2020-11-11 DIAGNOSIS — Z00.00 MEDICARE ANNUAL WELLNESS VISIT, SUBSEQUENT: ICD-10-CM

## 2020-11-11 DIAGNOSIS — M50.30 DDD (DEGENERATIVE DISC DISEASE), CERVICAL: ICD-10-CM

## 2020-11-11 DIAGNOSIS — I10 ESSENTIAL HYPERTENSION: Primary | ICD-10-CM

## 2020-11-11 DIAGNOSIS — Z71.89 ACP (ADVANCE CARE PLANNING): ICD-10-CM

## 2020-11-11 DIAGNOSIS — Z72.0 TOBACCO ABUSE: ICD-10-CM

## 2020-11-11 LAB
BACTERIA SPEC CULT: NORMAL
BACTERIA SPEC CULT: NORMAL
SERVICE CMNT-IMP: NORMAL

## 2020-11-11 PROCEDURE — 3017F COLORECTAL CA SCREEN DOC REV: CPT | Performed by: NURSE PRACTITIONER

## 2020-11-11 PROCEDURE — G8399 PT W/DXA RESULTS DOCUMENT: HCPCS | Performed by: NURSE PRACTITIONER

## 2020-11-11 PROCEDURE — G0463 HOSPITAL OUTPT CLINIC VISIT: HCPCS | Performed by: NURSE PRACTITIONER

## 2020-11-11 PROCEDURE — G8432 DEP SCR NOT DOC, RNG: HCPCS | Performed by: NURSE PRACTITIONER

## 2020-11-11 PROCEDURE — 1090F PRES/ABSN URINE INCON ASSESS: CPT | Performed by: NURSE PRACTITIONER

## 2020-11-11 PROCEDURE — 99214 OFFICE O/P EST MOD 30 MIN: CPT | Performed by: NURSE PRACTITIONER

## 2020-11-11 PROCEDURE — G8427 DOCREV CUR MEDS BY ELIG CLIN: HCPCS | Performed by: NURSE PRACTITIONER

## 2020-11-11 PROCEDURE — G0439 PPPS, SUBSEQ VISIT: HCPCS | Performed by: NURSE PRACTITIONER

## 2020-11-11 PROCEDURE — G8419 CALC BMI OUT NRM PARAM NOF/U: HCPCS | Performed by: NURSE PRACTITIONER

## 2020-11-11 PROCEDURE — G8752 SYS BP LESS 140: HCPCS | Performed by: NURSE PRACTITIONER

## 2020-11-11 PROCEDURE — 1101F PT FALLS ASSESS-DOCD LE1/YR: CPT | Performed by: NURSE PRACTITIONER

## 2020-11-11 PROCEDURE — G8536 NO DOC ELDER MAL SCRN: HCPCS | Performed by: NURSE PRACTITIONER

## 2020-11-11 PROCEDURE — G8754 DIAS BP LESS 90: HCPCS | Performed by: NURSE PRACTITIONER

## 2020-11-11 RX ORDER — OXYCODONE HYDROCHLORIDE 10 MG/1
10 TABLET ORAL
Qty: 90 TAB | Refills: 0 | Status: SHIPPED | OUTPATIENT
Start: 2020-11-11 | End: 2020-11-11 | Stop reason: SDUPTHER

## 2020-11-11 RX ORDER — MORPHINE SULFATE 15 MG/1
15 TABLET, FILM COATED, EXTENDED RELEASE ORAL EVERY 12 HOURS
Qty: 60 TAB | Refills: 0 | Status: SHIPPED | OUTPATIENT
Start: 2020-12-11 | End: 2020-11-11 | Stop reason: SDUPTHER

## 2020-11-11 RX ORDER — OXYCODONE HYDROCHLORIDE 10 MG/1
10 TABLET ORAL
Qty: 90 TAB | Refills: 0 | Status: SHIPPED | OUTPATIENT
Start: 2021-01-10 | End: 2021-02-09 | Stop reason: SDUPTHER

## 2020-11-11 RX ORDER — NALOXONE HYDROCHLORIDE 4 MG/.1ML
SPRAY NASAL
Qty: 1 EACH | Refills: 0 | Status: SHIPPED | OUTPATIENT
Start: 2020-11-11

## 2020-11-11 RX ORDER — MORPHINE SULFATE 15 MG/1
15 TABLET, FILM COATED, EXTENDED RELEASE ORAL EVERY 12 HOURS
Qty: 60 TAB | Refills: 0 | Status: SHIPPED | OUTPATIENT
Start: 2021-01-10 | End: 2021-02-09 | Stop reason: SDUPTHER

## 2020-11-11 RX ORDER — MORPHINE SULFATE 15 MG/1
15 TABLET, FILM COATED, EXTENDED RELEASE ORAL EVERY 12 HOURS
Qty: 60 TAB | Refills: 0 | Status: SHIPPED | OUTPATIENT
Start: 2020-11-11 | End: 2020-11-11 | Stop reason: SDUPTHER

## 2020-11-11 RX ORDER — OXYCODONE HYDROCHLORIDE 10 MG/1
10 TABLET ORAL
Qty: 90 TAB | Refills: 0 | Status: SHIPPED | OUTPATIENT
Start: 2020-12-11 | End: 2020-11-11 | Stop reason: SDUPTHER

## 2020-11-11 NOTE — LETTER
Name:.Mickie David WZP:[birth year] MR #:[id number] [address] 17 Page 1 of 5 CONTROLLED SUBSTANCE AGREEMENT I may be prescribed medications that are controlled substances as part  of my treatment plan for management of my medical condition(s). The goal of my treatment plan is to maintain and/or improve my health and wellbeing. Because controlled substances have an increased risk of abuse or harm, continual re-evaluation is needed determine if the goals of my treatment plan are being met for my safety and the safety of others. Jackelynevan Ayala  am entering into this Controlled Substance Agreement with my provider, __________________________________ at [facility] . I understand that successful treatment requires mutual trust and honesty between me and my provider. I understand that there are state and federal laws and regulations which apply to the medications that my provider may prescribe that must be followed. I understand there are risks and benefits ts of taking the medicines that my provider may prescribe. I understand and agree that following this Agreement is necessary in continuing my provider-patient relationship and success of my treatment plan. As a part of my treatment plan, I agree to the following: COMMUNICATION: 
 
1. I will communicate fully with my provider about my medical condition(s), including the effect on my daily life and how well my medications are helping. I will tell my provider all of the medications that I take for any reason, including medications I receive from another health care provider, and will notify my provider about all issues, problems or concerns, including any side effects, which may be related to my medications. I understand that this information allows my provider to adjust my treatment plan to help manage my medical condition.  I understand that this information will become part of my permanent medical record. 2. I will notify my provider if I have a history of alcohol/drug misuse/addiction or if I have had treatment for alcohol/drug addiction in the past, or if I have a new problem with or concern about alcohol/drug use/addiction, because this increases the likelihood of high risk behaviors and may lead to serious medical conditions. 3. Females Only: I will notify my provider if I am or become pregnant, or if I intend to become pregnant, or if I intend to breastfeed. I understand that communication of these issues with my provider is important, due to possible effects my medication could have on an unborn fetus or breastfeeding child. Initials_____ Name:Tim Bell IQU:[birth year] MR #:[id number] Lamine[address]-ann 17 Page 2 of 5 MISUSE OF MEDICATIONS / DRUGS: 
 
1. I agree to take all controlled substances as prescribed, and will not misuse or abuse any controlled substances prescribed by my provider. For my safety, I will not increase the amount of medicine I take without first talking with and getting permission from my provider. 2. If I have a medical emergency, another health care provider may prescribe me medication. If I seek emergency treatment, I will notify my provider within seventy-two (72) hours. 3. I understand that my provider may discuss my use and/or possible misuse/abuse of controlled substances and alcohol, as appropriate, with any health care provider involved in my care, pharmacist or legal authority. ILLEGAL DRUGS: 
 
1. I will not use illegal drugs of any kind, including but not limited to marijuana, heroin, cocaine, or any prescription drug which is not prescribed to me. DRUG DIVERSION / PRESCRIPTION FRAUD: 
 
1. I will not share, sell, trade, give away, or otherwise misuse my prescriptions or medications. 2. I will not alter any prescriptions provided to me by my provider. SINGLE PROVIDER: 
 
1. I agree that all controlled substances that I take will be prescribed only by my provider (or his/her covering provider) under this Agreement. This agreement does not prevent me from seeking emergency medical treatment or receiving pain management related to a surgery. PROTECTING MEDICATIONS: 
 
1. I am responsible for keeping my prescriptions and medications in a safe and secure place including safeguarding them from loss or theft. I understand that lost, stolen or damaged/destroyed prescriptions or medications will not be replaced. Initials____ Name:Tim Escalera PTO:[birth year] MR #:[id number] [address] 17 Page 3 of 5 PRESCRIPTION RENEWALS/REFILLS: 
 
1. I will follow my controlled substance medication schedule as prescribed by my provider. 2. I understand and agree that I will make any requests for renewals or refills of my prescriptions only at the time of an office visit or during my providers regular office hours subject to the prescription refill requirements of the individual practice. 3. I understand that my provider may not call in prescriptions for controlled substances to my pharmacy. 4. I understand that my provider may adjust or discontinue these medications as deemed appropriate for my medical treatment plan. This Agreement does not guarantee the prescription of controlled medications. 5. I agree that if my medications are adjusted or discontinued, I will properly dispose of any remaining medications. I understand that I will be required to dispose of any remaining controlled medications prior to being provided with any prescriptions for other controlled medications. 6. I understand that the renewal of my prescription depends on my medical condition, my consistent participation, and my adherence with my treatment plan and this Agreement. 7. I understand that if I do not keep an appointment with my provider, I may not receive a renewal or refill for my controlled substance medication. PRESCRIPTION MONITORING / DRUG TESTIN. I understand that my provider may require me to provide urine, saliva or blood for testing at any time. I understand that this testing will be used to monitor for safety and adherence with my treatment plan and this Agreement. 2. I understand that my provider may ask me to provide an observed urine specimen, which means that a nurse or other health care provider may watch me provide urine, and I agree to cooperate if I am asked to provide an observed specimen. 3. I understand that if I do not provide urine, saliva or blood samples within two (2) hours of my providers request, or other timeframe decided by my provider, my treatment plan could be changed, or my prescriptions and medications may be changed or ended. 4. I understand that urine, saliva and blood test results will be a part of my permanent medical record. Initials_____ Name:Tim House ABS:[id number] MR #:[id number] Caro 17 Page 4 of 5 
 
5. I understand that my provider is required to obtain a copy of my State Prescription Monitoring Program () Report at any time in order to safely prescribe medications. 6. I will bring all of my prescribed controlled substance medications in their original bottles to all of my scheduled appointments. 7. I understand that my provider may ask me to come to the practice with all of my prescribed medications for a random pill count at any time. I agree to cooperate if I am asked to come in for a random pill count. I understand that if I do not arrive in the timeframe decided by my provider, my treatment plan could be changed, or my prescriptions and medications may be changed or ended.  
 
COOPERATION WITH INVESTIGATIONS: 
 
 1. I authorize my provider and my pharmacy to cooperate fully with any local, state, or federal law enforcement agency in the investigation of any possible misuse, sale, or other diversion of my controlled substance prescriptions or medications. RISKS: 
 
1. I understand that my level of consciousness may be affected from the use of controlled substances, and I understand that there are risks, benefits, effects and potential alternatives (including no treatment) to the medications that my provider has prescribed. 2. I understand that I may become drowsy, tired, dizzy, constipated, and sick to my stomach, or have changes in my mood or in my sleep while taking my medications. I have talked with my provider about these possible side effects, risks, benefits, and alternative treatments, and my provider has answered all of my questions. 3. I understand that I should not suddenly stop taking my medications without first speaking with my provider. I understand that if I suddenly stop taking my medications, I may experience nausea, vomiting, sweating,anxiety, sleeplessness, itching or other uncomfortable feelings. 4. I will not take my medications with alcohol of any kind, including beer, wine or liquor. I understand that drinking alcohol with my medications increases the chances of side effects, including breathing problems or even death. 5. I understand that if I have a history of alcoholism or other drug addiction I may be at increased risk of addiction to my medications. Signs of addiction might include craving, compulsive use, and continued use despite harm. Since addiction is a disease, I understand my provider may decide to change my medications and refer me to appropriate treatment services. I understand that this information would become part of my permanent medical record. Initials_____ Name:Tim Lugo RQF:[birth year] MR #:[id number] Dahianalisa   
 Page 5 of 5  
 
 
6. Females only: Children born to women who regularly take controlled substances are likely to have physical problems and suffer withdrawal symptoms at birth. If I am of child-bearing age, I understand that I should use safe and effective birth control while taking any controlled substances to avoid the impact of medications on an unborn fetus or  child. I agree to notify my provider immediately if I should become pregnant so that my treatment plan can be adjusted. 7. Males only: I understand that chronic use of controlled substances has been associated with low testosterone levels in males which may affect my mood, stamina, sexual desire, and general health. I understand that my provider may order the appropriate laboratory test to determine my testosterone level,and I agree to this testing. ADHERENCE: 
 
1. I understand that if I do not adhere to this Agreement in any way, my provider may change my prescriptions, stop prescribing controlled substances or end our provider-patient relationship. 2. If my provider decides to stop prescribing medication, or decides to end our provider-patient relationship,my provider may require that I taper my medications slowly. If necessary, my provider may also provide a prescription for other medications to treat my withdrawal symptoms. UNDERSTANDING THIS AGREEMENT: 
 
I understand that my provider may adjust or stop my prescriptions for controlled substances based on my medical condition and my treatment plan. I understand that this Agreement does not guarantee that I will be prescribed medications or controlled substances. I understand that controlled substances may be just one part 
of my treatment plan. My initial on each page and my signature below shows that I have read each page of this Agreement, I have had an opportunity to ask questions, and all of my questions have been answered to my satisfaction by my provider. By signing below, I agree to comply with this Agreement, and I understand that if I do not follow the Agreements listed above, my provider may stop 
 
_________________________________________  Date/Time 11/11/2020 12:06 PM   
             (Patient Signature) 
 
________________________________________    Date/Time 11/11/2020 12:06 PM 
 (Parent or Guardian Signature if <18 yrs) 
 
_________________________________________ Date/Time 11/11/2020 12:06 PM 
 (Provider Signature)

## 2020-11-11 NOTE — PROGRESS NOTES
Chief Complaint   Patient presents with    Pre-op Exam     11/16/20 Dr Tierra Zeng neck surgery     1. Have you been to the ER, urgent care clinic since your last visit? Hospitalized since your last visit? No    2. Have you seen or consulted any other health care providers outside of the [address] [place] [address] since your last visit? Include any pap smears or colon screening.  Yes When: Dr Aubree Meek orthopedics

## 2020-11-11 NOTE — PROGRESS NOTES
[facility] Note    Lottie Jerome is a 78 y.o. female who was seen in clinic today (11/11/2020). Subjective:  Cardiovascular Review:  The patient has hypertension, hyperlipidemia, CAD s/p stenting and peripheral vascular disease. Seen by cardiology, Dr Cami Benjamin. Patient underwent nuclear stress test (2017) with normal findings. Diet and Lifestyle: generally follows a low fat low cholesterol diet, generally follows a low sodium diet, smoker 1 ppd  Home BP Monitoring: is not measured at home. Pertinent ROS: taking medications as instructed, no medication side effects noted, no TIA's, no chest pain on exertion, no dyspnea on exertion, no swelling of ankles. Patient refuses to take cholesterol medication as it causes myalgias. COPD Review:  The patient is being seen for follow up of COPD and history of 45 pack years tobacco use. Oxygen: She currently is not on home oxygen therapy. Symptoms: chronic dyspnea: severity = not present: course of sx: stable. Patient uses 1 pillows at night. Patient does smoke cigarettes. Peripheral Vascular Disease  Patient in with known h/o PVD. Patient denies symptoms at present. 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 The patient's risks factors for atherosclerosis include smoking, hypertension, peripheral vascular disease, hypercholesterolemia. She is seen by Dr Any Jurado annually. Osteoarthritis and Chronic Pain:  Patient has osteoarthritis and spondylosis, primarily affecting the back and hands. Symptoms onset: problem is longstanding. RA testing is negative. Rheumatological ROS: using narcotic analgesics regularly daily in stable pattern with good pain control and good quality of life. Pain managed by Dr. Fab Albarran - pain management. Patient had lumbar laminectomy in 8/2015 with Dr. Cathy Aragon without improvement. Patient seen by Dr. Army Yousesf in [facility].  Taking Oxycodone 10 TID and Morphine 15 mg BID - dismissed by specialist over a disagreement in her care. Patient scheduled for cervical fusion with Dr. Denise Pompa later this month. Anxiety Review:  Patient is seen for anxiety disorder, panic attacks and claustrophobia. Current treatment includes Xanax TID. Patient on Xanax for 20+ years. Patient previously treated Paxil, Zoloft, Prozac, Wellbutrin and had adverse reaction to medications. Ongoing symptoms include: racing thoughts, psychomotor agitation. Patient denies: sweating, chest pain, shortness of breath, difficulty concentrating, suicidal ideation. Reported side effects from the treatment: none. Prior to Admission medications    Medication Sig Start Date End Date Taking? Authorizing Provider   naloxone [facility]) 4 mg/actuation nasal spray Use 1 spray intranasally, then discard. Repeat with new spray every 2 min as needed for opioid overdose symptoms, alternating nostrils. 11/11/20  Yes Allen Jarvis NP   morphine CR (MS CONTIN) 15 mg CR tablet Take 1 Tab by mouth every twelve (12) hours for 30 days. Max Daily Amount: 30 mg. 1/10/21 2/9/21 Yes Allen Jarvis NP   oxyCODONE IR (ROXICODONE) 10 mg tab immediate release tablet Take 1 Tab by mouth every eight (8) hours as needed for Pain for up to 30 days. Max Daily Amount: 30 mg. 1/10/21 2/9/21 Yes Allen Jarvis NP   famotidine (Pepcid) 20 mg tablet Take 20 mg by mouth as needed for Heartburn. Yes Provider, Historical   ALPRAZolam (XANAX) 0.5 mg tablet TAKE 1 TABLET BY MOUTH THREE TIMES DAILY AS NEEDED FOR ANXIETY. MAX DAILY AMOUNT 1.5MG 6/25/20  Yes Allen Jarvis NP   hydroCHLOROthiazide (HYDRODIURIL) 25 mg tablet TK 1 T PO QD 9/7/19  Yes Provider, Historical   cetirizine (ZYRTEC) 10 mg tablet Take  by mouth daily.    Yes Provider, Historical          Allergies   Allergen Reactions    Penicillins Rash, Itching and Swelling     Of throat    Prednisone Anaphylaxis    Bactrim [Sulfamethoprim Ds] Rash    Biaxin [Clarithromycin] Rash    Ciprofloxacin Rash    Ivp Dye [Fd And C Blue No.1] Rash    Keflex [Cephalexin] Rash    Nickel Contact Dermatitis    Nsaids (Non-Steroidal Anti-Inflammatory Drug) Rash    Simvastatin Myalgia    Tetracycline Rash           ROS  See HPI    Objective:   Physical Exam  Vitals signs and nursing note reviewed. Constitutional:       Appearance: She is well-developed. Neck:      Musculoskeletal: Normal range of motion and neck supple. Thyroid: No thyromegaly. Vascular: Carotid bruit (left) present. No JVD. Cardiovascular:      Rate and Rhythm: Normal rate and regular rhythm. Heart sounds: Murmur present. No friction rub. No gallop. Pulmonary:      Effort: Pulmonary effort is normal. No respiratory distress. Breath sounds: Normal breath sounds. Lymphadenopathy:      Cervical: No cervical adenopathy. Neurological:      Mental Status: She is alert and oriented to person, place, and time. Psychiatric:         Behavior: Behavior normal.           Visit Vitals  /74 (BP 1 Location: Right arm, BP Patient Position: Sitting)   Pulse 97   Temp 98.8 °F (37.1 °C) (Temporal)   Resp 16   Ht 5' 6\" (1.676 m)   Wt 175 lb 6.4 oz (79.6 kg)   SpO2 95%   BMI 28.31 kg/m²       Assessment & Plan:  Diagnoses and all orders for this visit:    1. Essential hypertension  Well controlled, no medication changes. 2. Chronic obstructive pulmonary disease, unspecified COPD type (HCC)  Stable, no changes to current therapy    3. PVD (peripheral vascular disease) (HCC)  Stable, no changes to current therapy    4. Coronary artery disease involving native coronary artery of native heart without angina pectoris  Managed by cardiology, no changes. 5. Anxiety  Stable, no changes to current therapy    6. Tobacco abuse  Counseled patient on need to quit smoking. 7. Mixed hyperlipidemia  Has a statin allergy.      8. Chronic prescription opiate use  Patient has been on opiate therapy for over 20 years. I will managed her pain given stability of her treatment plan.  reviewed, controlled substance contract on file. Naltrexone prescribed and at home.   -     morphine CR (MS CONTIN) 15 mg CR tablet; Take 1 Tab by mouth every twelve (12) hours for 30 days. Max Daily Amount: 30 mg.  -     oxyCODONE IR (ROXICODONE) 10 mg tab immediate release tablet; Take 1 Tab by mouth every eight (8) hours as needed for Pain for up to 30 days. Max Daily Amount: 30 mg.    9. DDD (degenerative disc disease), lumbar  -     morphine CR (MS CONTIN) 15 mg CR tablet; Take 1 Tab by mouth every twelve (12) hours for 30 days. Max Daily Amount: 30 mg.  -     oxyCODONE IR (ROXICODONE) 10 mg tab immediate release tablet; Take 1 Tab by mouth every eight (8) hours as needed for Pain for up to 30 days. Max Daily Amount: 30 mg. 10. DDD (degenerative disc disease), cervical  Based on the above evaluation, the patient is medically stable and cleared for surgery. She also requires cardiac clearance     11. Medicare annual wellness visit, subsequent    12. ACP (advance care planning)    Other orders  -     naloxone (NARCAN) 4 mg/actuation nasal spray; Use 1 spray intranasally, then discard. Repeat with new spray every 2 min as needed for opioid overdose symptoms, alternating nostrils. I have discussed the diagnosis with the patient and the intended plan as seen in the above orders. The patient has received an after-visit summary along with patient information handout. I have discussed medication side effects and warnings with the patient as well.             Andres Ramos NP

## 2020-11-12 ENCOUNTER — TELEPHONE (OUTPATIENT)
Dept: FAMILY MEDICINE CLINIC | Age: 70
End: 2020-11-12

## 2020-11-12 LAB — SARS-COV-2, COV2NT: NOT DETECTED

## 2020-11-12 NOTE — TELEPHONE ENCOUNTER
Pt's  wanting to speak to cyn mckay pt's surgery on Monday     Please call pt's  back       BC[facility]# [phone]

## 2020-11-20 ENCOUNTER — OFFICE VISIT (OUTPATIENT)
Dept: FAMILY MEDICINE CLINIC | Age: 70
End: 2020-11-20
Payer: MEDICARE

## 2020-11-20 VITALS
OXYGEN SATURATION: 92 % | TEMPERATURE: 97.4 F | HEART RATE: 112 BPM | HEIGHT: 66 IN | RESPIRATION RATE: 16 BRPM | SYSTOLIC BLOOD PRESSURE: 152 MMHG | DIASTOLIC BLOOD PRESSURE: 80 MMHG | WEIGHT: 176.2 LBS | BODY MASS INDEX: 28.32 KG/M2

## 2020-11-20 DIAGNOSIS — I10 ESSENTIAL HYPERTENSION: ICD-10-CM

## 2020-11-20 DIAGNOSIS — F41.9 ANXIETY: ICD-10-CM

## 2020-11-20 DIAGNOSIS — Z79.891 CHRONIC USE OF OPIATE FOR THERAPEUTIC PURPOSE: ICD-10-CM

## 2020-11-20 DIAGNOSIS — I25.10 CORONARY ARTERY DISEASE INVOLVING NATIVE CORONARY ARTERY OF NATIVE HEART WITHOUT ANGINA PECTORIS: ICD-10-CM

## 2020-11-20 DIAGNOSIS — M50.30 DDD (DEGENERATIVE DISC DISEASE), CERVICAL: Primary | ICD-10-CM

## 2020-11-20 PROCEDURE — 1101F PT FALLS ASSESS-DOCD LE1/YR: CPT | Performed by: NURSE PRACTITIONER

## 2020-11-20 PROCEDURE — 1090F PRES/ABSN URINE INCON ASSESS: CPT | Performed by: NURSE PRACTITIONER

## 2020-11-20 PROCEDURE — G8432 DEP SCR NOT DOC, RNG: HCPCS | Performed by: NURSE PRACTITIONER

## 2020-11-20 PROCEDURE — G8399 PT W/DXA RESULTS DOCUMENT: HCPCS | Performed by: NURSE PRACTITIONER

## 2020-11-20 PROCEDURE — G8419 CALC BMI OUT NRM PARAM NOF/U: HCPCS | Performed by: NURSE PRACTITIONER

## 2020-11-20 PROCEDURE — G8753 SYS BP > OR = 140: HCPCS | Performed by: NURSE PRACTITIONER

## 2020-11-20 PROCEDURE — G0463 HOSPITAL OUTPT CLINIC VISIT: HCPCS | Performed by: NURSE PRACTITIONER

## 2020-11-20 PROCEDURE — G8536 NO DOC ELDER MAL SCRN: HCPCS | Performed by: NURSE PRACTITIONER

## 2020-11-20 PROCEDURE — G8754 DIAS BP LESS 90: HCPCS | Performed by: NURSE PRACTITIONER

## 2020-11-20 PROCEDURE — G8427 DOCREV CUR MEDS BY ELIG CLIN: HCPCS | Performed by: NURSE PRACTITIONER

## 2020-11-20 PROCEDURE — 99214 OFFICE O/P EST MOD 30 MIN: CPT | Performed by: NURSE PRACTITIONER

## 2020-11-20 PROCEDURE — 3017F COLORECTAL CA SCREEN DOC REV: CPT | Performed by: NURSE PRACTITIONER

## 2020-11-20 NOTE — PROGRESS NOTES
Chief Complaint   Patient presents with    Medication Reaction    Anxiety    Hypertension     1. Have you been to the ER, urgent care clinic since your last visit? Hospitalized since your last visit? No    2. Have you seen or consulted any other health care providers outside of the [address] [place] [address] since your last visit? Include any pap smears or colon screening.  No

## 2020-11-20 NOTE — PROGRESS NOTES
[facility] Note    Pamella Erwin is a 78 y.o. female who was seen in clinic today (11/20/2020). Subjective:  Cardiovascular Review:  The patient has hypertension, hyperlipidemia, CAD s/p stenting and peripheral vascular disease. Seen by cardiology, Dr Irina Nelson. Patient underwent nuclear stress test (2017) with normal findings. Home blood pressur ereasings: 130/70s   Diet and Lifestyle: generally follows a low fat low cholesterol diet, generally follows a low sodium diet, smoker 1 ppd  Home BP Monitoring: is not measured at home. Pertinent ROS: taking medications as instructed, no medication side effects noted, no TIA's, no chest pain on exertion, no dyspnea on exertion, no swelling of ankles. Patient refuses to take cholesterol medication as it causes myalgias. COPD Review:  The patient is being seen for follow up of COPD and history of 45 pack years tobacco use. Oxygen: She currently is not on home oxygen therapy. Symptoms: chronic dyspnea: severity = not present: course of sx: stable. Patient uses 1 pillows at night. Patient does smoke cigarettes. Peripheral Vascular Disease  Patient in with known h/o PVD. Patient denies symptoms at present. 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 The patient's risks factors for atherosclerosis include smoking, hypertension, peripheral vascular disease, hypercholesterolemia. She is seen by Dr Lorene Vincent annually. Osteoarthritis and Chronic Pain:  Patient has osteoarthritis and spondylosis, primarily affecting the back and hands. Symptoms onset: problem is longstanding. RA testing is negative. Rheumatological ROS: using narcotic analgesics regularly daily in stable pattern with good pain control and good quality of life. Pain managed by Dr. Jackelyn Bailon - pain management. Patient had lumbar laminectomy in 8/2015 with Dr. Ricco Connell without improvement.  Patient seen by Dr. Fadumo Barney in [facility]. Taking Oxycodone 10 TID and Morphine 15 mg BID - dismissed by specialist over a disagreement in her care. Patient scheduled for cervical fusion in December. Anxiety Review:  Patient is seen for anxiety disorder, panic attacks and claustrophobia. Current treatment includes Xanax TID. Patient on Xanax for 20+ years. Patient previously treated Paxil, Zoloft, Prozac, Wellbutrin and had adverse reaction to medications. Ongoing symptoms include: racing thoughts, psychomotor agitation. Patient denies: sweating, chest pain, shortness of breath, difficulty concentrating, suicidal ideation. Reported side effects from the treatment: none. Prior to Admission medications    Medication Sig Start Date End Date Taking? Authorizing Provider   naloxone [facility]) 4 mg/actuation nasal spray Use 1 spray intranasally, then discard. Repeat with new spray every 2 min as needed for opioid overdose symptoms, alternating nostrils. 11/11/20  Yes Clint Jarvis NP   morphine CR (MS CONTIN) 15 mg CR tablet Take 1 Tab by mouth every twelve (12) hours for 30 days. Max Daily Amount: 30 mg. 1/10/21 2/9/21 Yes Clint Jarvis NP   oxyCODONE IR (ROXICODONE) 10 mg tab immediate release tablet Take 1 Tab by mouth every eight (8) hours as needed for Pain for up to 30 days. Max Daily Amount: 30 mg. 1/10/21 2/9/21 Yes Clint Jarvis NP   famotidine (Pepcid) 20 mg tablet Take 20 mg by mouth as needed for Heartburn. Yes Provider, Historical   ALPRAZolam (XANAX) 0.5 mg tablet TAKE 1 TABLET BY MOUTH THREE TIMES DAILY AS NEEDED FOR ANXIETY. MAX DAILY AMOUNT 1.5MG 6/25/20  Yes Clint Jarvis NP   hydroCHLOROthiazide (HYDRODIURIL) 25 mg tablet TK 1 T PO QD 9/7/19  Yes Provider, Historical   cetirizine (ZYRTEC) 10 mg tablet Take  by mouth daily.    Yes Provider, Historical          Allergies   Allergen Reactions    Penicillins Rash, Itching and Swelling     Of throat    Prednisone Anaphylaxis    Bactrim [Sulfamethoprim Ds] Rash    Biaxin [Clarithromycin] Rash    Ciprofloxacin Rash    Ivp Dye [Fd And C Blue No.1] Rash    Keflex [Cephalexin] Rash    Nickel Contact Dermatitis    Nsaids (Non-Steroidal Anti-Inflammatory Drug) Rash    Simvastatin Myalgia    Tetracycline Rash           ROS  See HPI    Objective:   Physical Exam  Vitals signs and nursing note reviewed. Constitutional:       Appearance: She is well-developed. Cardiovascular:      Rate and Rhythm: Normal rate and regular rhythm. Heart sounds: No murmur. No friction rub. No gallop. Pulmonary:      Effort: Pulmonary effort is normal. No respiratory distress. Breath sounds: Normal breath sounds. Neurological:      Mental Status: She is alert and oriented to person, place, and time. Psychiatric:         Mood and Affect: Mood is anxious. Behavior: Behavior normal.         Thought Content: Thought content normal.         Judgment: Judgment normal.           Visit Vitals  BP (!) 152/80 (BP 1 Location: Right arm, BP Patient Position: Sitting)   Pulse (!) 112   Temp 97.4 °F (36.3 °C) (Temporal)   Resp 16   Ht 5' 6\" (1.676 m)   Wt 176 lb 3.2 oz (79.9 kg)   SpO2 92%   BMI 28.44 kg/m²       Assessment & Plan:  Diagnoses and all orders for this visit:    1. DDD (degenerative disc disease), cervical  Based on the above evaluation, the patient is stable and cleared for surgery. 2. Essential hypertension  Borderline control in clinic to day. Likely s/t anxiety. Continue home monitoring and call for reading >140/90    3. Chronic use of opiate for therapeutic purpose   reviewed, controlled substance contract on file. Stable, no changes to current therapy    4. Anxiety  Intolerant of SSRIs. Stable on Xanax. 5. Coronary artery disease involving native coronary artery of native heart without angina pectoris  Managed by cardiology, no changes.        I have discussed the diagnosis with the patient and the intended plan as seen in the above orders. The patient has received an after-visit summary along with patient information handout. I have discussed medication side effects and warnings with the patient as well. Follow-up and Dispositions    · Return in about 3 months (around 2/20/2021) for disease management, medication management.            Verygage Shape, NP

## 2020-11-30 PROBLEM — M50.30 DDD (DEGENERATIVE DISC DISEASE), CERVICAL: Status: ACTIVE | Noted: 2020-11-30

## 2020-11-30 PROBLEM — Z79.891 CHRONIC USE OF OPIATE FOR THERAPEUTIC PURPOSE: Status: ACTIVE | Noted: 2020-11-30

## 2020-12-14 DIAGNOSIS — F41.9 ANXIETY: ICD-10-CM

## 2020-12-14 RX ORDER — ALPRAZOLAM 0.5 MG/1
TABLET ORAL
Qty: 90 TAB | Refills: 5 | Status: SHIPPED | OUTPATIENT
Start: 2020-12-14 | End: 2021-05-11 | Stop reason: SDUPTHER

## 2020-12-14 NOTE — TELEPHONE ENCOUNTER
Pt requesting refill. Requested Prescriptions     Pending Prescriptions Disp Refills    ALPRAZolam (XANAX) 0.5 mg tablet 90 Tab 5     Sig: TAKE 1 TABLET BY MOUTH THREE TIMES DAILY AS NEEDED FOR ANXIETY.  MAX DAILY AMOUNT 1.5MG       [facility]# [phone]

## 2020-12-14 NOTE — TELEPHONE ENCOUNTER
No access to      Last visit 11/20/2020 SALVADOR Chaudhary   Next appointment 02/09/2021 SALVADOR Jarvis   Previous refill encounter(s)   06/25/2020 Xanax #90 with 5 refills. Requested Prescriptions     Pending Prescriptions Disp Refills    ALPRAZolam (XANAX) 0.5 mg tablet 90 Tab 5     Sig: TAKE 1 TABLET BY MOUTH THREE TIMES DAILY AS NEEDED FOR ANXIETY.  MAX DAILY AMOUNT 1.5MG

## 2021-02-09 ENCOUNTER — OFFICE VISIT (OUTPATIENT)
Dept: FAMILY MEDICINE CLINIC | Age: 71
End: 2021-02-09
Payer: MEDICARE

## 2021-02-09 VITALS
WEIGHT: 181 LBS | RESPIRATION RATE: 18 BRPM | OXYGEN SATURATION: 97 % | SYSTOLIC BLOOD PRESSURE: 155 MMHG | HEIGHT: 66 IN | HEART RATE: 114 BPM | DIASTOLIC BLOOD PRESSURE: 80 MMHG | TEMPERATURE: 98.1 F | BODY MASS INDEX: 29.09 KG/M2

## 2021-02-09 DIAGNOSIS — M51.36 DDD (DEGENERATIVE DISC DISEASE), LUMBAR: ICD-10-CM

## 2021-02-09 DIAGNOSIS — Z79.891 CHRONIC PRESCRIPTION OPIATE USE: ICD-10-CM

## 2021-02-09 DIAGNOSIS — J44.9 CHRONIC OBSTRUCTIVE PULMONARY DISEASE, UNSPECIFIED COPD TYPE (HCC): ICD-10-CM

## 2021-02-09 DIAGNOSIS — M50.30 DDD (DEGENERATIVE DISC DISEASE), CERVICAL: ICD-10-CM

## 2021-02-09 DIAGNOSIS — I73.9 PVD (PERIPHERAL VASCULAR DISEASE) (HCC): ICD-10-CM

## 2021-02-09 DIAGNOSIS — I10 ESSENTIAL HYPERTENSION: Primary | ICD-10-CM

## 2021-02-09 PROCEDURE — G8754 DIAS BP LESS 90: HCPCS | Performed by: NURSE PRACTITIONER

## 2021-02-09 PROCEDURE — 1090F PRES/ABSN URINE INCON ASSESS: CPT | Performed by: NURSE PRACTITIONER

## 2021-02-09 PROCEDURE — G8753 SYS BP > OR = 140: HCPCS | Performed by: NURSE PRACTITIONER

## 2021-02-09 PROCEDURE — G8399 PT W/DXA RESULTS DOCUMENT: HCPCS | Performed by: NURSE PRACTITIONER

## 2021-02-09 PROCEDURE — G0463 HOSPITAL OUTPT CLINIC VISIT: HCPCS | Performed by: NURSE PRACTITIONER

## 2021-02-09 PROCEDURE — G8427 DOCREV CUR MEDS BY ELIG CLIN: HCPCS | Performed by: NURSE PRACTITIONER

## 2021-02-09 PROCEDURE — 99214 OFFICE O/P EST MOD 30 MIN: CPT | Performed by: NURSE PRACTITIONER

## 2021-02-09 PROCEDURE — G8536 NO DOC ELDER MAL SCRN: HCPCS | Performed by: NURSE PRACTITIONER

## 2021-02-09 PROCEDURE — 1101F PT FALLS ASSESS-DOCD LE1/YR: CPT | Performed by: NURSE PRACTITIONER

## 2021-02-09 PROCEDURE — 3017F COLORECTAL CA SCREEN DOC REV: CPT | Performed by: NURSE PRACTITIONER

## 2021-02-09 PROCEDURE — G8419 CALC BMI OUT NRM PARAM NOF/U: HCPCS | Performed by: NURSE PRACTITIONER

## 2021-02-09 PROCEDURE — G8432 DEP SCR NOT DOC, RNG: HCPCS | Performed by: NURSE PRACTITIONER

## 2021-02-09 RX ORDER — OXYCODONE HYDROCHLORIDE 10 MG/1
10 TABLET ORAL
Qty: 90 TAB | Refills: 0 | Status: SHIPPED | OUTPATIENT
Start: 2021-02-09 | End: 2021-02-16 | Stop reason: SDUPTHER

## 2021-02-09 RX ORDER — MORPHINE SULFATE 15 MG/1
15 TABLET, FILM COATED, EXTENDED RELEASE ORAL EVERY 12 HOURS
Qty: 60 TAB | Refills: 0 | Status: SHIPPED | OUTPATIENT
Start: 2021-02-09 | End: 2021-02-16 | Stop reason: SDUPTHER

## 2021-02-09 NOTE — PROGRESS NOTES
Chief Complaint   Patient presents with    Pain (Chronic)     med check      1. Have you been to the ER, urgent care clinic since your last visit? Hospitalized since your last visit? No    2. Have you seen or consulted any other health care providers outside of the [address] [place] [address] since your last visit? Include any pap smears or colon screening.  No

## 2021-02-09 NOTE — PROGRESS NOTES
[facility] Note    Julio Cesar Coffey is a 73 y.o. female who was seen in clinic today (2/9/2021). Subjective:  Cardiovascular Review:  The patient has hypertension, hyperlipidemia, CAD s/p stenting and peripheral vascular disease. Seen by cardiology, Dr Farideh Mckoy. Patient underwent nuclear stress test (2017) with normal findings. Home blood pressur ereasings: 130/70s   Diet and Lifestyle: generally follows a low fat low cholesterol diet, generally follows a low sodium diet, smoker 1 ppd  Home BP Monitoring: is not measured at home. Pertinent ROS: taking medications as instructed, no medication side effects noted, no TIA's, no chest pain on exertion, no dyspnea on exertion, no swelling of ankles. Patient refuses to take cholesterol medication as it causes myalgias. COPD Review:  The patient is being seen for follow up of COPD and history of 45 pack years tobacco use. Oxygen: She currently is not on home oxygen therapy. Symptoms: chronic dyspnea: severity = not present: course of sx: stable. Patient uses 1 pillows at night. Patient does smoke cigarettes. Peripheral Vascular Disease  Patient in with known h/o PVD. Patient denies symptoms at present. 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 The patient's risks factors for atherosclerosis include smoking, hypertension, peripheral vascular disease, hypercholesterolemia. She is seen by Dr Bruce Capone annually. Osteoarthritis and Chronic Pain:  Patient has osteoarthritis and spondylosis, primarily affecting the back and hands. Symptoms onset: problem is longstanding. RA testing is negative. Rheumatological ROS: using narcotic analgesics regularly daily in stable pattern with good pain control and good quality of life. Pain managed by Dr. Renata Motta - pain management. Patient had lumbar laminectomy in 8/2015 with Dr. Marlin Turner without improvement.  Patient seen by Dr. Lilly Gardner in [facility]. Taking Oxycodone 10 TID and Morphine 15 mg BID - dismissed by specialist over a disagreement in her care. Patient scheduled for cervical fusion in December. Anxiety Review:  Patient is seen for anxiety disorder, panic attacks and claustrophobia. Current treatment includes Xanax TID. Patient on Xanax for 20+ years. Patient previously treated Paxil, Zoloft, Prozac, Wellbutrin and had adverse reaction to medications. Ongoing symptoms include: racing thoughts, psychomotor agitation. Patient denies: sweating, chest pain, shortness of breath, difficulty concentrating, suicidal ideation. Reported side effects from the treatment: none. Prior to Admission medications    Medication Sig Start Date End Date Taking? Authorizing Provider   oxyCODONE IR (ROXICODONE) 10 mg tab immediate release tablet Take 1 Tab by mouth every eight (8) hours as needed for Pain for up to 30 days. Max Daily Amount: 30 mg. 4/10/21 5/10/21 Yes Soo Jarvis NP   morphine CR (MS CONTIN) 15 mg CR tablet Take 1 Tab by mouth every twelve (12) hours for 30 days. Max Daily Amount: 30 mg. 4/10/21 5/10/21 Yes Soo Jarvis NP   morphine CR (MS CONTIN) 15 mg CR tablet Take 1 Tab by mouth every twelve (12) hours for 30 days. Max Daily Amount: 30 mg. 3/11/21 4/10/21 Yes Soo Jarvis NP   oxyCODONE IR (ROXICODONE) 10 mg tab immediate release tablet Take 1 Tab by mouth every eight (8) hours as needed for Pain for up to 30 days. Max Daily Amount: 30 mg. 3/11/21 4/10/21 Yes Soo Jarvis NP   ALPRAZolam [facility]) 0.5 mg tablet TAKE 1 TABLET BY MOUTH THREE TIMES DAILY AS NEEDED FOR ANXIETY. MAX DAILY AMOUNT 1.5MG 12/14/20  Yes Soo Jarvis NP   naloxone [facility]) 4 mg/actuation nasal spray Use 1 spray intranasally, then discard. Repeat with new spray every 2 min as needed for opioid overdose symptoms, alternating nostrils.  11/11/20  Yes Isabel Leon NP   famotidine (Pepcid) 20 mg tablet Take 20 mg by mouth as needed for Heartburn. Yes Provider, Historical   hydroCHLOROthiazide (HYDRODIURIL) 25 mg tablet TK 1 T PO QD 9/7/19  Yes Provider, Historical   cetirizine (ZYRTEC) 10 mg tablet Take  by mouth daily. Yes Provider, Historical   morphine CR (MS CONTIN) 15 mg CR tablet Take 1 Tab by mouth every twelve (12) hours for 30 days. Max Daily Amount: 30 mg. 2/9/21 2/16/21  Tyler River NP   oxyCODONE IR (ROXICODONE) 10 mg tab immediate release tablet Take 1 Tab by mouth every eight (8) hours as needed for Pain for up to 30 days. Max Daily Amount: 30 mg. 2/9/21 2/16/21  Tyler River NP          Allergies   Allergen Reactions    Penicillins Rash, Itching and Swelling     Of throat    Prednisone Anaphylaxis    Bactrim [Sulfamethoprim Ds] Rash    Biaxin [Clarithromycin] Rash    Ciprofloxacin Rash    Ivp Dye [Fd And C Blue No.1] Rash    Keflex [Cephalexin] Rash    Nickel Contact Dermatitis    Nsaids (Non-Steroidal Anti-Inflammatory Drug) Rash    Simvastatin Myalgia    Tetracycline Rash           ROS  See HPI    Objective:   Physical Exam  Vitals signs and nursing note reviewed. Constitutional:       Appearance: She is well-developed. Neck:      Musculoskeletal: Normal range of motion and neck supple. Thyroid: No thyromegaly. Vascular: Carotid bruit (left) present. No JVD. Cardiovascular:      Rate and Rhythm: Normal rate and regular rhythm. Heart sounds: Murmur present. No friction rub. No gallop. Pulmonary:      Effort: Pulmonary effort is normal. No respiratory distress. Breath sounds: Normal breath sounds. Lymphadenopathy:      Cervical: No cervical adenopathy. Neurological:      Mental Status: She is alert and oriented to person, place, and time.    Psychiatric:         Behavior: Behavior normal.           Visit Vitals  BP (!) 155/80 (BP 1 Location: Left upper arm, BP Patient Position: Sitting, BP Cuff Size: Adult)   Pulse (!) 114   Temp 98.1 °F (36.7 °C) (Oral)   Resp 18   Ht 5' 6\" (1.676 m)   Wt 181 lb (82.1 kg)   SpO2 97%   BMI 29.21 kg/m²       Assessment & Plan:  Diagnoses and all orders for this visit:    1. Essential hypertension  Elevated in clinic. Patient reports is secondary to pain. Defers medication changes. Recommend patient begin home monitoring and call for readings greater than 140/90. Also reviewed low-sodium diet and weight loss. 2. Chronic obstructive pulmonary disease, unspecified COPD type ([facility] [address]  Stable at present. Counseled patient on need to quit smoking. 3. PVD (peripheral vascular disease) ([facility] [address]  Managed by cardiology. No changes. Patient needs improved control of risk factors. 4. DDD (degenerative disc disease), cervical  Patient to follow-up with neurosurgery as previously discussed. -     10-DRUG SCREEN, URINE W RFLX CONFIRMATION; Future    5. DDD (degenerative disc disease), lumbar   reviewed, controlled substance contract on file. Stable, no changes to current therapy. UDS today. -     10-DRUG SCREEN, URINE W RFLX CONFIRMATION; Future  -     oxyCODONE IR (ROXICODONE) 10 mg tab immediate release tablet; Take 1 Tab by mouth every eight (8) hours as needed for Pain for up to 30 days. Max Daily Amount: 30 mg.  -     morphine CR (MS CONTIN) 15 mg CR tablet; Take 1 Tab by mouth every twelve (12) hours for 30 days. Max Daily Amount: 30 mg.  -     morphine CR (MS CONTIN) 15 mg CR tablet; Take 1 Tab by mouth every twelve (12) hours for 30 days. Max Daily Amount: 30 mg.  -     oxyCODONE IR (ROXICODONE) 10 mg tab immediate release tablet; Take 1 Tab by mouth every eight (8) hours as needed for Pain for up to 30 days. Max Daily Amount: 30 mg.    6. Chronic prescription opiate use  As above. -     10-DRUG SCREEN, URINE W RFLX CONFIRMATION; Future  -     oxyCODONE IR (ROXICODONE) 10 mg tab immediate release tablet; Take 1 Tab by mouth every eight (8) hours as needed for Pain for up to 30 days.  Max Daily Amount: 30 mg.  -     morphine CR (MS CONTIN) 15 mg CR tablet; Take 1 Tab by mouth every twelve (12) hours for 30 days. Max Daily Amount: 30 mg.  -     morphine CR (MS CONTIN) 15 mg CR tablet; Take 1 Tab by mouth every twelve (12) hours for 30 days. Max Daily Amount: 30 mg.  -     oxyCODONE IR (ROXICODONE) 10 mg tab immediate release tablet; Take 1 Tab by mouth every eight (8) hours as needed for Pain for up to 30 days. Max Daily Amount: 30 mg.        I have discussed the diagnosis with the patient and the intended plan as seen in the above orders. The patient has received an after-visit summary along with patient information handout. I have discussed medication side effects and warnings with the patient as well. Follow-up and Dispositions    · Return in about 3 months (around 5/9/2021) for medication management.            Maty Mcghee NP

## 2021-02-16 LAB
ALPRAZ UR CFM-MCNC: 197 NG/ML
ALPRAZ UR QL: POSITIVE
AMPHETAMINES UR QL SCN: NEGATIVE NG/ML
BARBITURATES UR QL SCN: NEGATIVE NG/ML
BENZODIAZ UR QL: POSITIVE NG/ML
BZE UR QL SCN: NEGATIVE NG/ML
CANNABINOIDS UR QL SCN: NEGATIVE NG/ML
CLONAZEPAM UR QL: NEGATIVE
CODEINE, 737848: NEGATIVE
CREAT UR-MCNC: 45.9 MG/DL (ref 20–300)
FLURAZEPAM UR QL: NEGATIVE
HYDROCODONE, 737854: NEGATIVE
HYDROMORPHONE, 737852: NEGATIVE
LORAZEPAM UR QL: NEGATIVE
METHADONE UR QL SCN: NEGATIVE NG/ML
MIDAZOLAM UR QL CFM: NEGATIVE
MORPHINE CONFIRM, 737851: >3000 NG/ML
MORPHINE, 737850: POSITIVE
NORDIAZEPAM UR QL: NEGATIVE
OPIATES UR QL SCN: ABNORMAL NG/ML
OPIATES, 737847: POSITIVE NG/ML
OXAZEPAM UR QL: NEGATIVE
OXYCODONE CONFIRM, 763901: 2861 NG/ML
OXYCODONE+OXYMORPHONE UR QL SCN: ABNORMAL NG/ML
OXYCODONE, 763899: POSITIVE
OXYCODONE/OXYMORPH, 763898: POSITIVE
OXYMORPHONE CONFIRM, 763903: 1798 NG/ML
OXYMORPHONE, 763902: POSITIVE
PCP UR QL: NEGATIVE NG/ML
PH UR: 5 [PH] (ref 4.5–8.9)
PLEASE NOTE:, 733157: ABNORMAL
PROPOXYPH UR QL SCN: NEGATIVE NG/ML
SP GR UR: 1.01
TEMAZEPAM UR QL CFM: NEGATIVE
TRIAZOLAM UR QL: NEGATIVE

## 2021-02-16 RX ORDER — MORPHINE SULFATE 15 MG/1
15 TABLET, FILM COATED, EXTENDED RELEASE ORAL EVERY 12 HOURS
Qty: 60 TAB | Refills: 0 | Status: SHIPPED | OUTPATIENT
Start: 2021-03-11 | End: 2021-04-10

## 2021-02-16 RX ORDER — MORPHINE SULFATE 15 MG/1
15 TABLET, FILM COATED, EXTENDED RELEASE ORAL EVERY 12 HOURS
Qty: 60 TAB | Refills: 0 | Status: SHIPPED | OUTPATIENT
Start: 2021-04-10 | End: 2021-05-10 | Stop reason: SDUPTHER

## 2021-02-16 RX ORDER — OXYCODONE HYDROCHLORIDE 10 MG/1
10 TABLET ORAL
Qty: 90 TAB | Refills: 0 | Status: SHIPPED | OUTPATIENT
Start: 2021-03-11 | End: 2021-04-10

## 2021-02-16 RX ORDER — OXYCODONE HYDROCHLORIDE 10 MG/1
10 TABLET ORAL
Qty: 90 TAB | Refills: 0 | Status: SHIPPED | OUTPATIENT
Start: 2021-04-10 | End: 2021-05-10 | Stop reason: SDUPTHER

## 2021-03-15 ENCOUNTER — IMMUNIZATION (OUTPATIENT)
Dept: INTERNAL MEDICINE CLINIC | Age: 71
End: 2021-03-15
Payer: MEDICARE

## 2021-03-15 DIAGNOSIS — Z23 ENCOUNTER FOR IMMUNIZATION: Primary | ICD-10-CM

## 2021-03-15 PROCEDURE — 0001A COVID-19, MRNA, LNP-S, PF, 30MCG/0.3ML DOSE(PFIZER): CPT | Performed by: FAMILY MEDICINE

## 2021-03-15 PROCEDURE — 91300 COVID-19, MRNA, LNP-S, PF, 30MCG/0.3ML DOSE(PFIZER): CPT | Performed by: FAMILY MEDICINE

## 2021-04-05 ENCOUNTER — IMMUNIZATION (OUTPATIENT)
Dept: INTERNAL MEDICINE CLINIC | Age: 71
End: 2021-04-05
Payer: MEDICARE

## 2021-04-05 DIAGNOSIS — Z23 ENCOUNTER FOR IMMUNIZATION: Primary | ICD-10-CM

## 2021-04-05 PROCEDURE — 0002A COVID-19, MRNA, LNP-S, PF, 30MCG/0.3ML DOSE(PFIZER): CPT | Performed by: FAMILY MEDICINE

## 2021-04-05 PROCEDURE — 91300 COVID-19, MRNA, LNP-S, PF, 30MCG/0.3ML DOSE(PFIZER): CPT | Performed by: FAMILY MEDICINE

## 2021-04-17 ASSESSMENT — TONOMETRY
OD_IOP_MMHG: 18
OS_IOP_MMHG: 16

## 2021-04-17 ASSESSMENT — VISUAL ACUITY
OD_OTHER: 20/30. 20/40.
OD_CC: 20/25-
OS_CC: 20/40
OD_BAT: 20/30
OS_OTHER: 20/40. 20/50.
OS_CC: J1
OD_CC: J1
OS_BAT: 20/40

## 2021-05-10 DIAGNOSIS — M51.36 DDD (DEGENERATIVE DISC DISEASE), LUMBAR: ICD-10-CM

## 2021-05-10 DIAGNOSIS — Z79.891 CHRONIC PRESCRIPTION OPIATE USE: ICD-10-CM

## 2021-05-10 RX ORDER — OXYCODONE HYDROCHLORIDE 10 MG/1
10 TABLET ORAL
Qty: 90 TAB | Refills: 0 | Status: SHIPPED | OUTPATIENT
Start: 2021-05-10 | End: 2021-05-10 | Stop reason: SDUPTHER

## 2021-05-10 RX ORDER — MORPHINE SULFATE 15 MG/1
15 TABLET, FILM COATED, EXTENDED RELEASE ORAL EVERY 12 HOURS
Qty: 60 TAB | Refills: 0 | Status: SHIPPED | OUTPATIENT
Start: 2021-05-10 | End: 2021-05-10 | Stop reason: SDUPTHER

## 2021-05-10 RX ORDER — OXYCODONE HYDROCHLORIDE 10 MG/1
10 TABLET ORAL
Qty: 90 TAB | Refills: 0 | Status: SHIPPED | OUTPATIENT
Start: 2021-05-10 | End: 2021-05-11 | Stop reason: SDUPTHER

## 2021-05-10 RX ORDER — MORPHINE SULFATE 15 MG/1
15 TABLET, FILM COATED, EXTENDED RELEASE ORAL EVERY 12 HOURS
Qty: 60 TAB | Refills: 0 | Status: SHIPPED | OUTPATIENT
Start: 2021-05-10 | End: 2021-05-11 | Stop reason: SDUPTHER

## 2021-05-11 ENCOUNTER — OFFICE VISIT (OUTPATIENT)
Dept: FAMILY MEDICINE CLINIC | Age: 71
End: 2021-05-11
Payer: MEDICARE

## 2021-05-11 VITALS
TEMPERATURE: 97.6 F | RESPIRATION RATE: 16 BRPM | OXYGEN SATURATION: 97 % | WEIGHT: 181.6 LBS | DIASTOLIC BLOOD PRESSURE: 76 MMHG | HEIGHT: 66 IN | SYSTOLIC BLOOD PRESSURE: 135 MMHG | BODY MASS INDEX: 29.18 KG/M2 | HEART RATE: 85 BPM

## 2021-05-11 DIAGNOSIS — I10 ESSENTIAL HYPERTENSION: Primary | ICD-10-CM

## 2021-05-11 DIAGNOSIS — M51.36 DDD (DEGENERATIVE DISC DISEASE), LUMBAR: ICD-10-CM

## 2021-05-11 DIAGNOSIS — E78.2 MIXED HYPERLIPIDEMIA: ICD-10-CM

## 2021-05-11 DIAGNOSIS — I73.9 PVD (PERIPHERAL VASCULAR DISEASE) (HCC): ICD-10-CM

## 2021-05-11 DIAGNOSIS — Z79.891 CHRONIC USE OF OPIATE FOR THERAPEUTIC PURPOSE: ICD-10-CM

## 2021-05-11 DIAGNOSIS — R73.03 PRE-DIABETES: ICD-10-CM

## 2021-05-11 DIAGNOSIS — J44.9 CHRONIC OBSTRUCTIVE PULMONARY DISEASE, UNSPECIFIED COPD TYPE (HCC): ICD-10-CM

## 2021-05-11 DIAGNOSIS — F41.9 ANXIETY: ICD-10-CM

## 2021-05-11 PROCEDURE — 99214 OFFICE O/P EST MOD 30 MIN: CPT | Performed by: NURSE PRACTITIONER

## 2021-05-11 PROCEDURE — G8536 NO DOC ELDER MAL SCRN: HCPCS | Performed by: NURSE PRACTITIONER

## 2021-05-11 PROCEDURE — G8427 DOCREV CUR MEDS BY ELIG CLIN: HCPCS | Performed by: NURSE PRACTITIONER

## 2021-05-11 PROCEDURE — G8399 PT W/DXA RESULTS DOCUMENT: HCPCS | Performed by: NURSE PRACTITIONER

## 2021-05-11 PROCEDURE — G8419 CALC BMI OUT NRM PARAM NOF/U: HCPCS | Performed by: NURSE PRACTITIONER

## 2021-05-11 PROCEDURE — G0463 HOSPITAL OUTPT CLINIC VISIT: HCPCS | Performed by: NURSE PRACTITIONER

## 2021-05-11 PROCEDURE — G8752 SYS BP LESS 140: HCPCS | Performed by: NURSE PRACTITIONER

## 2021-05-11 PROCEDURE — G8432 DEP SCR NOT DOC, RNG: HCPCS | Performed by: NURSE PRACTITIONER

## 2021-05-11 PROCEDURE — 1101F PT FALLS ASSESS-DOCD LE1/YR: CPT | Performed by: NURSE PRACTITIONER

## 2021-05-11 PROCEDURE — G8754 DIAS BP LESS 90: HCPCS | Performed by: NURSE PRACTITIONER

## 2021-05-11 PROCEDURE — 3017F COLORECTAL CA SCREEN DOC REV: CPT | Performed by: NURSE PRACTITIONER

## 2021-05-11 PROCEDURE — 1090F PRES/ABSN URINE INCON ASSESS: CPT | Performed by: NURSE PRACTITIONER

## 2021-05-11 RX ORDER — ALPRAZOLAM 0.5 MG/1
TABLET ORAL
Qty: 90 TAB | Refills: 5 | Status: SHIPPED | OUTPATIENT
Start: 2021-05-11

## 2021-05-11 RX ORDER — OXYCODONE HYDROCHLORIDE 10 MG/1
10 TABLET ORAL
Qty: 90 TAB | Refills: 0 | Status: SHIPPED | OUTPATIENT
Start: 2021-07-10 | End: 2021-06-22 | Stop reason: SDUPTHER

## 2021-05-11 RX ORDER — MORPHINE SULFATE 15 MG/1
15 TABLET, FILM COATED, EXTENDED RELEASE ORAL EVERY 12 HOURS
Qty: 60 TAB | Refills: 0 | Status: SHIPPED | OUTPATIENT
Start: 2021-06-10 | End: 2021-05-11 | Stop reason: SDUPTHER

## 2021-05-11 RX ORDER — OXYCODONE HYDROCHLORIDE 10 MG/1
10 TABLET ORAL
Qty: 90 TAB | Refills: 0 | Status: SHIPPED | OUTPATIENT
Start: 2021-06-10 | End: 2021-05-11 | Stop reason: SDUPTHER

## 2021-05-11 RX ORDER — MORPHINE SULFATE 15 MG/1
15 TABLET, FILM COATED, EXTENDED RELEASE ORAL EVERY 12 HOURS
Qty: 60 TAB | Refills: 0 | Status: SHIPPED | OUTPATIENT
Start: 2021-07-10 | End: 2021-06-22 | Stop reason: SDUPTHER

## 2021-05-11 NOTE — PROGRESS NOTES
[facility] Note    Magaly Mckeon is a 73 y.o. female who was seen in clinic today (5/11/2021). Subjective:  Cardiovascular Review:  The patient has hypertension, hyperlipidemia, CAD s/p stenting and peripheral vascular disease. Seen by cardiology, Dr Elsa Fallon. Patient underwent nuclear stress test (2017) with normal findings. Home blood pressur ereasings: 130/70s   Diet and Lifestyle: generally follows a low fat low cholesterol diet, generally follows a low sodium diet, smoker 1 ppd  Home BP Monitoring: is not measured at home. Pertinent ROS: taking medications as instructed, no medication side effects noted, no TIA's, no chest pain on exertion, no dyspnea on exertion, no swelling of ankles. Patient refuses to take cholesterol medication as it causes myalgias. COPD Review:  The patient is being seen for follow up of COPD and history of 45 pack years tobacco use. Oxygen: She currently is not on home oxygen therapy. Symptoms: chronic dyspnea: severity = not present: course of sx: stable. Patient uses 1 pillows at night. Patient does smoke cigarettes. Peripheral Vascular Disease  Patient in with known h/o PVD. Patient denies symptoms at present. 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 The patient's risks factors for atherosclerosis include smoking, hypertension, peripheral vascular disease, hypercholesterolemia. She is seen by Dr Alverto Abel annually. Osteoarthritis and Chronic Pain:  Patient has osteoarthritis and spondylosis, primarily affecting the back and hands. Symptoms onset: problem is longstanding. RA testing is negative. Rheumatological ROS: using narcotic analgesics regularly daily in stable pattern with good pain control and good quality of life. Pain managed by Dr. Sasha Marroquin - pain management. Patient had lumbar laminectomy in 8/2015 with Dr. Jeff Escudero without improvement.  Patient seen by Dr. Shahab Hermosillo in [facility]. Taking Oxycodone 10 TID and Morphine 15 mg BID - dismissed by specialist over a disagreement in her care. Patient scheduled for cervical fusion in December. Anxiety Review:  Patient is seen for anxiety disorder, panic attacks and claustrophobia. Current treatment includes Xanax TID. Patient on Xanax for 20+ years. Patient previously treated Paxil, Zoloft, Prozac, Wellbutrin and had adverse reaction to medications. Ongoing symptoms include: racing thoughts, psychomotor agitation. Patient denies: sweating, chest pain, shortness of breath, difficulty concentrating, suicidal ideation. Reported side effects from the treatment: none. Prior to Admission medications    Medication Sig Start Date End Date Taking? Authorizing Provider   ALPRAZolam (XANAX) 0.5 mg tablet TAKE 1 TABLET BY MOUTH THREE TIMES DAILY AS NEEDED FOR ANXIETY. MAX DAILY AMOUNT 1.5MG 5/11/21  Yes Jina Jarvis NP   morphine CR (MS CONTIN) 15 mg CR tablet Take 1 Tab by mouth every twelve (12) hours for 30 days. Max Daily Amount: 30 mg. 7/10/21 8/9/21 Yes Jina Jarvis NP   oxyCODONE IR (ROXICODONE) 10 mg tab immediate release tablet Take 1 Tab by mouth every eight (8) hours as needed for Pain for up to 30 days. Max Daily Amount: 30 mg. 7/10/21 8/9/21 Yes Jina Jarvis NP   naloxone [facility]) 4 mg/actuation nasal spray Use 1 spray intranasally, then discard. Repeat with new spray every 2 min as needed for opioid overdose symptoms, alternating nostrils. 11/11/20  Yes Yamel Salazar NP   famotidine (Pepcid) 20 mg tablet Take 20 mg by mouth as needed for Heartburn. Yes Provider, Historical   cetirizine (ZYRTEC) 10 mg tablet Take  by mouth daily. Yes Provider, Historical   morphine CR (MS CONTIN) 15 mg CR tablet Take 1 Tab by mouth every twelve (12) hours for 30 days.  Max Daily Amount: 30 mg. 6/10/21 5/11/21  Yamel Salazar NP   oxyCODONE IR (ROXICODONE) 10 mg tab immediate release tablet Take 1 Tab by mouth every eight (8) hours as needed for Pain for up to 30 days. Max Daily Amount: 30 mg. 6/10/21 5/11/21  Sofi Campos NP   morphine CR (MS CONTIN) 15 mg CR tablet Take 1 Tab by mouth every twelve (12) hours for 30 days. Max Daily Amount: 30 mg. 5/10/21 5/11/21  Sofi Campos NP   oxyCODONE IR (ROXICODONE) 10 mg tab immediate release tablet Take 1 Tab by mouth every eight (8) hours as needed for Pain for up to 30 days. Max Daily Amount: 30 mg. 5/10/21 5/11/21  Sofi Campos NP   ALPRAZolam Clorinda Crazier) 0.5 mg tablet TAKE 1 TABLET BY MOUTH THREE TIMES DAILY AS NEEDED FOR ANXIETY. MAX DAILY AMOUNT 1.5MG 12/14/20 5/11/21  Sofi Campos NP   hydroCHLOROthiazide (HYDRODIURIL) 25 mg tablet TK 1 T PO QD 9/7/19   Provider, Historical          Allergies   Allergen Reactions    Penicillins Rash, Itching and Swelling     Of throat    Prednisone Anaphylaxis    Bactrim [Sulfamethoprim Ds] Rash    Biaxin [Clarithromycin] Rash    Ciprofloxacin Rash    Ivp Dye [Fd And C Blue No.1] Rash    Keflex [Cephalexin] Rash    Nickel Contact Dermatitis    Nsaids (Non-Steroidal Anti-Inflammatory Drug) Rash    Simvastatin Myalgia    Tetracycline Rash           ROS  See HPI    Objective:   Physical Exam  Vitals signs and nursing note reviewed. Constitutional:       Appearance: She is well-developed. Neck:      Musculoskeletal: Normal range of motion and neck supple. Thyroid: No thyromegaly. Vascular: Carotid bruit (left) present. No JVD. Cardiovascular:      Rate and Rhythm: Normal rate and regular rhythm. Heart sounds: Murmur present. No friction rub. No gallop. Pulmonary:      Effort: Pulmonary effort is normal. No respiratory distress. Breath sounds: Normal breath sounds. Lymphadenopathy:      Cervical: No cervical adenopathy. Neurological:      Mental Status: She is alert and oriented to person, place, and time.    Psychiatric:         Behavior: Behavior normal. Visit Vitals  /76 (BP 1 Location: Right arm, BP Patient Position: Sitting)   Pulse 85   Temp 97.6 °F (36.4 °C) (Temporal)   Resp 16   Ht 5' 6\" (1.676 m)   Wt 181 lb 9.6 oz (82.4 kg)   SpO2 97%   BMI 29.31 kg/m²       Assessment & Plan:  Diagnoses and all orders for this visit:    1. Essential hypertension  Well controlled, no medication changes. -     CBC W/O DIFF; Future  -     METABOLIC PANEL, COMPREHENSIVE; Future    2. PVD (peripheral vascular disease) ([facility]  Managed by cardiology, no changes. 3. Mixed hyperlipidemia  Intolerant of statin therapy, no changes.  -     LIPID PANEL; Future    4. Chronic obstructive pulmonary disease, unspecified COPD type ([facility]  Counseled patient on need to quit smoking. 5. DDD (degenerative disc disease), lumbar   reviewed, controlled substance contract on file. Stable, no changes to current therapy  -     10-DRUG SCREEN, URINE W RFLX CONFIRMATION; Future  -     morphine CR (MS CONTIN) 15 mg CR tablet; Take 1 Tab by mouth every twelve (12) hours for 30 days. Max Daily Amount: 30 mg.  -     oxyCODONE IR (ROXICODONE) 10 mg tab immediate release tablet; Take 1 Tab by mouth every eight (8) hours as needed for Pain for up to 30 days. Max Daily Amount: 30 mg.    6. Chronic use of opiate for therapeutic purpose  -     10-DRUG SCREEN, URINE W RFLX CONFIRMATION; Future  -     morphine CR (MS CONTIN) 15 mg CR tablet; Take 1 Tab by mouth every twelve (12) hours for 30 days. Max Daily Amount: 30 mg.  -     oxyCODONE IR (ROXICODONE) 10 mg tab immediate release tablet; Take 1 Tab by mouth every eight (8) hours as needed for Pain for up to 30 days. Max Daily Amount: 30 mg.    7. Anxiety   reviewed, controlled substance contract on file. Stable, no changes to current therapy  -     ALPRAZolam (XANAX) 0.5 mg tablet; TAKE 1 TABLET BY MOUTH THREE TIMES DAILY AS NEEDED FOR ANXIETY. MAX DAILY AMOUNT 1.5MG    8. Pre-diabetes  -     HEMOGLOBIN A1C WITH EAG;  Future      I have discussed the diagnosis with the patient and the intended plan as seen in the above orders. The patient has received an after-visit summary along with patient information handout. I have discussed medication side effects and warnings with the patient as well. Follow-up and Dispositions    · Return in about 3 months (around 8/11/2021) for medication management.            Edilma Juarez NP

## 2021-05-11 NOTE — PROGRESS NOTES
Chief Complaint   Patient presents with    Medication Refill     1. Have you been to the ER, urgent care clinic since your last visit? Hospitalized since your last visit? No    2. Have you seen or consulted any other health care providers outside of the [address] [place] [address] since your last visit? Include any pap smears or colon screening.  No

## 2021-05-15 LAB
ALPRAZ UR CFM-MCNC: 197 NG/ML
ALPRAZ UR QL: POSITIVE
AMPHETAMINES UR QL SCN: NEGATIVE NG/ML
BARBITURATES UR QL SCN: NEGATIVE NG/ML
BENZODIAZ UR QL: POSITIVE NG/ML
BZE UR QL SCN: NEGATIVE NG/ML
CANNABINOIDS UR QL SCN: NEGATIVE NG/ML
CLONAZEPAM UR QL: NEGATIVE
CODEINE, 737848: NEGATIVE
CREAT UR-MCNC: 44.3 MG/DL (ref 20–300)
FLURAZEPAM UR QL: NEGATIVE
HYDROCODONE, 737854: NEGATIVE
HYDROMORPHONE, 737852: NEGATIVE
LORAZEPAM UR QL: NEGATIVE
METHADONE UR QL SCN: NEGATIVE NG/ML
MIDAZOLAM UR QL CFM: NEGATIVE
MORPHINE CONFIRM, 737851: >3000 NG/ML
MORPHINE, 737850: POSITIVE
NORDIAZEPAM UR QL: NEGATIVE
OPIATES UR QL SCN: ABNORMAL NG/ML
OPIATES, 737847: POSITIVE NG/ML
OXAZEPAM UR QL: NEGATIVE
OXYCODONE CONFIRM, 763901: 2711 NG/ML
OXYCODONE+OXYMORPHONE UR QL SCN: ABNORMAL NG/ML
OXYCODONE, 763899: POSITIVE
OXYCODONE/OXYMORPH, 763898: POSITIVE
OXYMORPHONE CONFIRM, 763903: 1139 NG/ML
OXYMORPHONE, 763902: POSITIVE
PCP UR QL: NEGATIVE NG/ML
PH UR: 4.9 [PH] (ref 4.5–8.9)
PLEASE NOTE:, 733157: ABNORMAL
PROPOXYPH UR QL SCN: NEGATIVE NG/ML
SP GR UR: 1.01
TEMAZEPAM UR QL CFM: NEGATIVE
TRIAZOLAM UR QL: NEGATIVE

## 2021-06-15 LAB
ALBUMIN SERPL-MCNC: 4.4 G/DL (ref 3.8–4.8)
ALBUMIN/GLOB SERPL: 1.7 {RATIO} (ref 1.2–2.2)
ALP SERPL-CCNC: 99 IU/L (ref 48–121)
ALT SERPL-CCNC: 18 IU/L (ref 0–32)
AST SERPL-CCNC: 21 IU/L (ref 0–40)
BILIRUB SERPL-MCNC: 0.5 MG/DL (ref 0–1.2)
BUN SERPL-MCNC: 8 MG/DL (ref 8–27)
BUN/CREAT SERPL: 11 (ref 12–28)
CALCIUM SERPL-MCNC: 9.9 MG/DL (ref 8.7–10.3)
CHLORIDE SERPL-SCNC: 99 MMOL/L (ref 96–106)
CHOLEST SERPL-MCNC: 227 MG/DL (ref 100–199)
CO2 SERPL-SCNC: 23 MMOL/L (ref 20–29)
CREAT SERPL-MCNC: 0.76 MG/DL (ref 0.57–1)
ERYTHROCYTE [DISTWIDTH] IN BLOOD BY AUTOMATED COUNT: 12.6 % (ref 11.7–15.4)
EST. AVERAGE GLUCOSE BLD GHB EST-MCNC: 120 MG/DL
GLOBULIN SER CALC-MCNC: 2.6 G/DL (ref 1.5–4.5)
GLUCOSE SERPL-MCNC: 98 MG/DL (ref 65–99)
HBA1C MFR BLD: 5.8 % (ref 4.8–5.6)
HCT VFR BLD AUTO: 47.8 % (ref 34–46.6)
HDLC SERPL-MCNC: 67 MG/DL
HGB BLD-MCNC: 16.4 G/DL (ref 11.1–15.9)
IMP & REVIEW OF LAB RESULTS: NORMAL
LDLC SERPL CALC-MCNC: 144 MG/DL (ref 0–99)
MCH RBC QN AUTO: 30.1 PG (ref 26.6–33)
MCHC RBC AUTO-ENTMCNC: 34.3 G/DL (ref 31.5–35.7)
MCV RBC AUTO: 88 FL (ref 79–97)
PLATELET # BLD AUTO: 214 X10E3/UL (ref 150–450)
POTASSIUM SERPL-SCNC: 4.3 MMOL/L (ref 3.5–5.2)
PROT SERPL-MCNC: 7 G/DL (ref 6–8.5)
RBC # BLD AUTO: 5.45 X10E6/UL (ref 3.77–5.28)
SODIUM SERPL-SCNC: 138 MMOL/L (ref 134–144)
TRIGL SERPL-MCNC: 91 MG/DL (ref 0–149)
VLDLC SERPL CALC-MCNC: 16 MG/DL (ref 5–40)
WBC # BLD AUTO: 5.5 X10E3/UL (ref 3.4–10.8)

## 2021-06-17 ENCOUNTER — OFFICE VISIT (OUTPATIENT)
Dept: FAMILY MEDICINE CLINIC | Age: 71
End: 2021-06-17
Payer: MEDICARE

## 2021-06-17 VITALS
SYSTOLIC BLOOD PRESSURE: 152 MMHG | HEART RATE: 77 BPM | DIASTOLIC BLOOD PRESSURE: 71 MMHG | WEIGHT: 182.2 LBS | RESPIRATION RATE: 16 BRPM | OXYGEN SATURATION: 96 % | HEIGHT: 66 IN | BODY MASS INDEX: 29.28 KG/M2 | TEMPERATURE: 98.1 F

## 2021-06-17 DIAGNOSIS — I10 ESSENTIAL HYPERTENSION: ICD-10-CM

## 2021-06-17 DIAGNOSIS — E78.2 MIXED HYPERLIPIDEMIA: ICD-10-CM

## 2021-06-17 DIAGNOSIS — I25.10 CORONARY ARTERY DISEASE INVOLVING NATIVE CORONARY ARTERY OF NATIVE HEART WITHOUT ANGINA PECTORIS: ICD-10-CM

## 2021-06-17 DIAGNOSIS — Z72.0 TOBACCO ABUSE: ICD-10-CM

## 2021-06-17 DIAGNOSIS — J44.9 CHRONIC OBSTRUCTIVE PULMONARY DISEASE, UNSPECIFIED COPD TYPE (HCC): ICD-10-CM

## 2021-06-17 DIAGNOSIS — M51.36 DDD (DEGENERATIVE DISC DISEASE), LUMBAR: Primary | ICD-10-CM

## 2021-06-17 DIAGNOSIS — I73.9 PVD (PERIPHERAL VASCULAR DISEASE) (HCC): ICD-10-CM

## 2021-06-17 DIAGNOSIS — Z79.891 CHRONIC USE OF OPIATE FOR THERAPEUTIC PURPOSE: ICD-10-CM

## 2021-06-17 DIAGNOSIS — F41.9 ANXIETY: ICD-10-CM

## 2021-06-17 PROCEDURE — G8399 PT W/DXA RESULTS DOCUMENT: HCPCS | Performed by: NURSE PRACTITIONER

## 2021-06-17 PROCEDURE — 1090F PRES/ABSN URINE INCON ASSESS: CPT | Performed by: NURSE PRACTITIONER

## 2021-06-17 PROCEDURE — G8753 SYS BP > OR = 140: HCPCS | Performed by: NURSE PRACTITIONER

## 2021-06-17 PROCEDURE — G8419 CALC BMI OUT NRM PARAM NOF/U: HCPCS | Performed by: NURSE PRACTITIONER

## 2021-06-17 PROCEDURE — G8432 DEP SCR NOT DOC, RNG: HCPCS | Performed by: NURSE PRACTITIONER

## 2021-06-17 PROCEDURE — G8754 DIAS BP LESS 90: HCPCS | Performed by: NURSE PRACTITIONER

## 2021-06-17 PROCEDURE — G8536 NO DOC ELDER MAL SCRN: HCPCS | Performed by: NURSE PRACTITIONER

## 2021-06-17 PROCEDURE — 1101F PT FALLS ASSESS-DOCD LE1/YR: CPT | Performed by: NURSE PRACTITIONER

## 2021-06-17 PROCEDURE — 99214 OFFICE O/P EST MOD 30 MIN: CPT | Performed by: NURSE PRACTITIONER

## 2021-06-17 PROCEDURE — G0463 HOSPITAL OUTPT CLINIC VISIT: HCPCS | Performed by: NURSE PRACTITIONER

## 2021-06-17 PROCEDURE — 3017F COLORECTAL CA SCREEN DOC REV: CPT | Performed by: NURSE PRACTITIONER

## 2021-06-17 PROCEDURE — G8427 DOCREV CUR MEDS BY ELIG CLIN: HCPCS | Performed by: NURSE PRACTITIONER

## 2021-06-17 NOTE — PROGRESS NOTES
Chief Complaint   Patient presents with    Labs     1. Have you been to the ER, urgent care clinic since your last visit? Hospitalized since your last visit? No    2. Have you seen or consulted any other health care providers outside of the [address] [place] [address] since your last visit? Include any pap smears or colon screening.  No

## 2021-06-22 RX ORDER — OXYCODONE HYDROCHLORIDE 10 MG/1
10 TABLET ORAL
Qty: 90 TABLET | Refills: 0 | Status: SHIPPED | OUTPATIENT
Start: 2021-08-09 | End: 2021-09-08

## 2021-06-22 RX ORDER — MORPHINE SULFATE 15 MG/1
15 TABLET, FILM COATED, EXTENDED RELEASE ORAL EVERY 12 HOURS
Qty: 60 TABLET | Refills: 0 | Status: SHIPPED | OUTPATIENT
Start: 2021-08-09 | End: 2021-09-08

## 2021-06-22 NOTE — PROGRESS NOTES
[facility] Note    Iraida Gaspar is a 73 y.o. female who was seen in clinic today (6/17/2021). Subjective:  Cardiovascular Review:  The patient has hypertension, hyperlipidemia, CAD s/p stenting and peripheral vascular disease. Seen by cardiology, Dr Juanita Gutierrez. Patient underwent nuclear stress test (2017) with normal findings. Home blood pressur ereasings: 130/70s   Diet and Lifestyle: generally follows a low fat low cholesterol diet, generally follows a low sodium diet, smoker 1 ppd  Home BP Monitoring: is not measured at home. Pertinent ROS: taking medications as instructed, no medication side effects noted, no TIA's, no chest pain on exertion, no dyspnea on exertion, no swelling of ankles. Patient refuses to take cholesterol medication as it causes myalgias. COPD Review:  The patient is being seen for follow up of COPD and history of 45 pack years tobacco use. Oxygen: She currently is not on home oxygen therapy. Symptoms: chronic dyspnea: severity = not present: course of sx: stable. Patient uses 1 pillows at night. Patient does smoke cigarettes. Peripheral Vascular Disease  Patient in with known h/o PVD. Patient denies symptoms at present. 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 The patient's risks factors for atherosclerosis include smoking, hypertension, peripheral vascular disease, hypercholesterolemia. She is seen by Dr Yisel Maldonado annually. Osteoarthritis and Chronic Pain:  Patient has osteoarthritis and spondylosis, primarily affecting the back and hands. Symptoms onset: problem is longstanding. RA testing is negative. Rheumatological ROS: using narcotic analgesics regularly daily in stable pattern with good pain control and good quality of life. Pain managed by Dr. Lalo Ibrahim - pain management. Patient had lumbar laminectomy in 8/2015 with Dr. Deepak Rosario without improvement.  Patient seen by Dr. Nona Zhang in [facility]. Taking Oxycodone 10 TID and Morphine 15 mg BID - dismissed by specialist over a disagreement in her care. Patient scheduled for cervical fusion in December. Anxiety Review:  Patient is seen for anxiety disorder, panic attacks and claustrophobia. Current treatment includes Xanax TID. Patient on Xanax for 20+ years. Patient previously treated Paxil, Zoloft, Prozac, Wellbutrin and had adverse reaction to medications. Ongoing symptoms include: racing thoughts, psychomotor agitation. Patient denies: sweating, chest pain, shortness of breath, difficulty concentrating, suicidal ideation. Reported side effects from the treatment: none. Prior to Admission medications    Medication Sig Start Date End Date Taking? Authorizing Provider   morphine CR (MS CONTIN) 15 mg CR tablet Take 1 Tablet by mouth every twelve (12) hours for 30 days. Max Daily Amount: 30 mg. 8/9/21 9/8/21 Yes Eduin Jarvis NP   oxyCODONE IR (ROXICODONE) 10 mg tab immediate release tablet Take 1 Tablet by mouth every eight (8) hours as needed for Pain for up to 30 days. Max Daily Amount: 30 mg. 8/9/21 9/8/21 Yes Eduin Jarvis NP   ALPRAZolam (XANAX) 0.5 mg tablet TAKE 1 TABLET BY MOUTH THREE TIMES DAILY AS NEEDED FOR ANXIETY. MAX DAILY AMOUNT 1.5MG 5/11/21  Yes Eduin Jarvis NP   naloxone [facility]) 4 mg/actuation nasal spray Use 1 spray intranasally, then discard. Repeat with new spray every 2 min as needed for opioid overdose symptoms, alternating nostrils. 11/11/20  Yes Efe Olvera NP   famotidine (Pepcid) 20 mg tablet Take 20 mg by mouth as needed for Heartburn. Yes Provider, Historical   hydroCHLOROthiazide (HYDRODIURIL) 25 mg tablet TK 1 T PO QD 9/7/19  Yes Provider, Historical   cetirizine (ZYRTEC) 10 mg tablet Take  by mouth daily. Yes Provider, Historical   morphine CR (MS CONTIN) 15 mg CR tablet Take 1 Tab by mouth every twelve (12) hours for 30 days. Max Daily Amount: 30 mg. 7/10/21 6/22/21  Mickie Martell, SALVADOR   oxyCODONE IR (ROXICODONE) 10 mg tab immediate release tablet Take 1 Tab by mouth every eight (8) hours as needed for Pain for up to 30 days. Max Daily Amount: 30 mg. 7/10/21 6/22/21  Mickie Martell, SALVADOR          Allergies   Allergen Reactions    Penicillins Rash, Itching and Swelling     Of throat    Prednisone Anaphylaxis    Bactrim [Sulfamethoprim Ds] Rash    Biaxin [Clarithromycin] Rash    Ciprofloxacin Rash    Ivp Dye [Fd And C Blue No.1] Rash    Keflex [Cephalexin] Rash    Nickel Contact Dermatitis    Nsaids (Non-Steroidal Anti-Inflammatory Drug) Rash    Simvastatin Myalgia    Tetracycline Rash           ROS  See HPI    Objective:   Physical Exam  Vitals and nursing note reviewed. Constitutional:       Appearance: She is well-developed. Neck:      Thyroid: No thyromegaly. Vascular: Carotid bruit (left) present. No JVD. Cardiovascular:      Rate and Rhythm: Normal rate and regular rhythm. Heart sounds: Murmur heard. No friction rub. No gallop. Pulmonary:      Effort: Pulmonary effort is normal. No respiratory distress. Breath sounds: Normal breath sounds. Musculoskeletal:      Cervical back: Normal range of motion and neck supple. Lymphadenopathy:      Cervical: No cervical adenopathy. Neurological:      Mental Status: She is alert and oriented to person, place, and time. Psychiatric:         Behavior: Behavior normal.           Visit Vitals  BP (!) 152/71 (BP 1 Location: Left upper arm, BP Patient Position: Sitting)   Pulse 77   Temp 98.1 °F (36.7 °C) (Temporal)   Resp 16   Ht 5' 6\" (1.676 m)   Wt 182 lb 3.2 oz (82.6 kg)   SpO2 96%   BMI 29.41 kg/m²       Assessment & Plan:  Diagnoses and all orders for this visit:    1. DDD (degenerative disc disease), lumbar   reviewed, controlled substance contract on file. Stable, no changes to current therapy.   I have advised patient that we leave the clinic and try to seek care elsewhere for management of her opiates. List of pain management providers given to patient.  -     REFERRAL TO PAIN MANAGEMENT  -     morphine CR (MS CONTIN) 15 mg CR tablet; Take 1 Tablet by mouth every twelve (12) hours for 30 days. Max Daily Amount: 30 mg.  -     oxyCODONE IR (ROXICODONE) 10 mg tab immediate release tablet; Take 1 Tablet by mouth every eight (8) hours as needed for Pain for up to 30 days. Max Daily Amount: 30 mg.    2. Chronic use of opiate for therapeutic purpose  -     REFERRAL TO PAIN MANAGEMENT  -     morphine CR (MS CONTIN) 15 mg CR tablet; Take 1 Tablet by mouth every twelve (12) hours for 30 days. Max Daily Amount: 30 mg.  -     oxyCODONE IR (ROXICODONE) 10 mg tab immediate release tablet; Take 1 Tablet by mouth every eight (8) hours as needed for Pain for up to 30 days. Max Daily Amount: 30 mg.    3. Coronary artery disease involving native coronary artery of native heart without angina pectoris  Managed by cardiology, no changes. 4. Chronic obstructive pulmonary disease, unspecified COPD type (HCC)  Stable at present, no changes. Advised patient to quit smoking. 5. Essential hypertension  Well controlled, no medication changes. 6. Mixed hyperlipidemia  Intolerant of statin therapy; patient defers treatment at present. 7. PVD (peripheral vascular disease) ([facility]  Managed by vascular surgery, no changes. 8. Tobacco abuse  Counseled patient on need to quit smoking. 9. Anxiety  Patient has over 20-year history of Xanax use. She remained stable with current medication and dosing. Possible risks of long-term benzodiazepine therapy reviewed with patient. Changes to current medication plan deferred      I have discussed the diagnosis with the patient and the intended plan as seen in the above orders. The patient has received an after-visit summary along with patient information handout.   I have discussed medication side effects and warnings with the patient as well.      Follow-up and Dispositions    · Return in about 6 months (around 12/17/2021) for disease management.            Gilson Chau NP

## 2021-10-27 PROBLEM — Z91.199 NO-SHOW FOR APPOINTMENT: Status: ACTIVE | Noted: 2021-10-27

## 2021-12-07 ENCOUNTER — HOSPITAL ENCOUNTER (OUTPATIENT)
Dept: LAB | Age: 71
Discharge: HOME | End: 2021-12-07

## 2021-12-07 PROCEDURE — 83036 HEMOGLOBIN GLYCOSYLATED A1C: CPT

## 2021-12-07 PROCEDURE — 80053 COMPREHEN METABOLIC PANEL: CPT

## 2021-12-07 PROCEDURE — 85025 COMPLETE CBC W/AUTO DIFF WBC: CPT

## 2021-12-07 PROCEDURE — 80061 LIPID PANEL: CPT

## 2021-12-08 LAB
ALBUMIN SERPL-MCNC: 3.5 G/DL (ref 3.5–5)
ALBUMIN/GLOB SERPL: 1 {RATIO} (ref 1.1–2.2)
ALP SERPL-CCNC: 97 U/L (ref 45–117)
ALT SERPL-CCNC: 34 U/L (ref 12–78)
ANION GAP SERPL CALC-SCNC: 10 MMOL/L (ref 5–15)
AST SERPL-CCNC: 15 U/L (ref 15–37)
BASOPHILS # BLD: 0.1 K/UL (ref 0–0.1)
BASOPHILS NFR BLD: 1 % (ref 0–1)
BILIRUB SERPL-MCNC: 0.4 MG/DL (ref 0.2–1)
BUN SERPL-MCNC: 10 MG/DL (ref 6–20)
BUN/CREAT SERPL: 13 (ref 12–20)
CALCIUM SERPL-MCNC: 9.2 MG/DL (ref 8.5–10.1)
CHLORIDE SERPL-SCNC: 102 MMOL/L (ref 97–108)
CHOLEST SERPL-MCNC: 213 MG/DL
CO2 SERPL-SCNC: 28 MMOL/L (ref 21–32)
CREAT SERPL-MCNC: 0.79 MG/DL (ref 0.55–1.02)
DIFFERENTIAL METHOD BLD: ABNORMAL
EOSINOPHIL # BLD: 0.1 K/UL (ref 0–0.4)
EOSINOPHIL NFR BLD: 1 % (ref 0–7)
ERYTHROCYTE [DISTWIDTH] IN BLOOD BY AUTOMATED COUNT: 13.5 % (ref 11.5–14.5)
EST. AVERAGE GLUCOSE BLD GHB EST-MCNC: 120 MG/DL
GLOBULIN SER CALC-MCNC: 3.6 G/DL (ref 2–4)
GLUCOSE SERPL-MCNC: 123 MG/DL (ref 65–100)
HBA1C MFR BLD: 5.8 % (ref 4–5.6)
HCT VFR BLD AUTO: 49.2 % (ref 35–47)
HDLC SERPL-MCNC: 72 MG/DL
HDLC SERPL: 3 {RATIO} (ref 0–5)
HGB BLD-MCNC: 15.7 G/DL (ref 11.5–16)
IMM GRANULOCYTES # BLD AUTO: 0 K/UL (ref 0–0.04)
IMM GRANULOCYTES NFR BLD AUTO: 0 % (ref 0–0.5)
LDLC SERPL CALC-MCNC: 125.2 MG/DL (ref 0–100)
LYMPHOCYTES # BLD: 1.7 K/UL (ref 0.8–3.5)
LYMPHOCYTES NFR BLD: 20 % (ref 12–49)
MCH RBC QN AUTO: 29.1 PG (ref 26–34)
MCHC RBC AUTO-ENTMCNC: 31.9 G/DL (ref 30–36.5)
MCV RBC AUTO: 91.1 FL (ref 80–99)
MONOCYTES # BLD: 0.6 K/UL (ref 0–1)
MONOCYTES NFR BLD: 6 % (ref 5–13)
NEUTS SEG # BLD: 6.3 K/UL (ref 1.8–8)
NEUTS SEG NFR BLD: 72 % (ref 32–75)
NRBC # BLD: 0 K/UL (ref 0–0.01)
NRBC BLD-RTO: 0 PER 100 WBC
PLATELET # BLD AUTO: 247 K/UL (ref 150–400)
PMV BLD AUTO: 10 FL (ref 8.9–12.9)
POTASSIUM SERPL-SCNC: 4.5 MMOL/L (ref 3.5–5.1)
PROT SERPL-MCNC: 7.1 G/DL (ref 6.4–8.2)
RBC # BLD AUTO: 5.4 M/UL (ref 3.8–5.2)
SODIUM SERPL-SCNC: 140 MMOL/L (ref 136–145)
TRIGL SERPL-MCNC: 79 MG/DL (ref ?–150)
VLDLC SERPL CALC-MCNC: 15.8 MG/DL
WBC # BLD AUTO: 8.8 K/UL (ref 3.6–11)

## 2022-01-06 ENCOUNTER — HOSPITAL ENCOUNTER (OUTPATIENT)
Dept: LAB | Age: 72
Discharge: HOME | End: 2022-01-06

## 2022-01-06 PROCEDURE — 80307 DRUG TEST PRSMV CHEM ANLYZR: CPT

## 2022-01-07 LAB
AMPHET UR QL SCN: NEGATIVE
BARBITURATES UR QL SCN: NEGATIVE
BENZODIAZ UR QL: POSITIVE
CANNABINOIDS UR QL SCN: NEGATIVE
COCAINE UR QL SCN: NEGATIVE
DRUG SCRN COMMENT,DRGCM: ABNORMAL
METHADONE UR QL: NEGATIVE
OPIATES UR QL: POSITIVE
PCP UR QL: NEGATIVE

## 2022-02-17 ENCOUNTER — HOSPITAL ENCOUNTER (OUTPATIENT)
Dept: LAB | Age: 72
Discharge: HOME | End: 2022-02-17

## 2022-02-17 PROCEDURE — 80048 BASIC METABOLIC PNL TOTAL CA: CPT

## 2022-02-17 PROCEDURE — 82607 VITAMIN B-12: CPT

## 2022-02-17 PROCEDURE — 84439 ASSAY OF FREE THYROXINE: CPT

## 2022-02-17 PROCEDURE — 83540 ASSAY OF IRON: CPT

## 2022-02-17 PROCEDURE — 82306 VITAMIN D 25 HYDROXY: CPT

## 2022-02-17 PROCEDURE — 85025 COMPLETE CBC W/AUTO DIFF WBC: CPT

## 2022-02-17 PROCEDURE — 84443 ASSAY THYROID STIM HORMONE: CPT

## 2022-02-18 LAB
25(OH)D3 SERPL-MCNC: 12 NG/ML (ref 30–100)
ANION GAP SERPL CALC-SCNC: 10 MMOL/L (ref 5–15)
BASOPHILS # BLD: 0.1 K/UL (ref 0–0.1)
BASOPHILS NFR BLD: 1 % (ref 0–1)
BUN SERPL-MCNC: 6 MG/DL (ref 6–20)
BUN/CREAT SERPL: 7 (ref 12–20)
CALCIUM SERPL-MCNC: 9.3 MG/DL (ref 8.5–10.1)
CHLORIDE SERPL-SCNC: 101 MMOL/L (ref 97–108)
CO2 SERPL-SCNC: 28 MMOL/L (ref 21–32)
CREAT SERPL-MCNC: 0.83 MG/DL (ref 0.55–1.02)
DIFFERENTIAL METHOD BLD: ABNORMAL
EOSINOPHIL # BLD: 0.2 K/UL (ref 0–0.4)
EOSINOPHIL NFR BLD: 3 % (ref 0–7)
ERYTHROCYTE [DISTWIDTH] IN BLOOD BY AUTOMATED COUNT: 13.7 % (ref 11.5–14.5)
FOLATE SERPL-MCNC: 11.9 NG/ML (ref 5–21)
GLUCOSE SERPL-MCNC: 118 MG/DL (ref 65–100)
HCT VFR BLD AUTO: 48.6 % (ref 35–47)
HGB BLD-MCNC: 15.8 G/DL (ref 11.5–16)
IMM GRANULOCYTES # BLD AUTO: 0 K/UL (ref 0–0.04)
IMM GRANULOCYTES NFR BLD AUTO: 0 % (ref 0–0.5)
IRON SATN MFR SERPL: 22 % (ref 20–50)
IRON SERPL-MCNC: 80 UG/DL (ref 50–170)
LYMPHOCYTES # BLD: 1.6 K/UL (ref 0.8–3.5)
LYMPHOCYTES NFR BLD: 25 % (ref 12–49)
MCH RBC QN AUTO: 29.4 PG (ref 26–34)
MCHC RBC AUTO-ENTMCNC: 32.5 G/DL (ref 30–36.5)
MCV RBC AUTO: 90.5 FL (ref 80–99)
MONOCYTES # BLD: 0.4 K/UL (ref 0–1)
MONOCYTES NFR BLD: 7 % (ref 5–13)
NEUTS SEG # BLD: 4.1 K/UL (ref 1.8–8)
NEUTS SEG NFR BLD: 64 % (ref 32–75)
NRBC # BLD: 0 K/UL (ref 0–0.01)
NRBC BLD-RTO: 0 PER 100 WBC
PLATELET # BLD AUTO: 234 K/UL (ref 150–400)
PMV BLD AUTO: 10.2 FL (ref 8.9–12.9)
POTASSIUM SERPL-SCNC: 4.9 MMOL/L (ref 3.5–5.1)
RBC # BLD AUTO: 5.37 M/UL (ref 3.8–5.2)
SODIUM SERPL-SCNC: 139 MMOL/L (ref 136–145)
T4 FREE SERPL-MCNC: 1.2 NG/DL (ref 0.8–1.5)
TIBC SERPL-MCNC: 362 UG/DL (ref 250–450)
TSH SERPL DL<=0.05 MIU/L-ACNC: 1.05 UIU/ML (ref 0.36–3.74)
VIT B12 SERPL-MCNC: 509 PG/ML (ref 193–986)
WBC # BLD AUTO: 6.4 K/UL (ref 3.6–11)

## 2022-03-18 PROBLEM — M51.36 DDD (DEGENERATIVE DISC DISEASE), LUMBAR: Status: ACTIVE | Noted: 2018-01-24

## 2022-03-18 PROBLEM — Z91.199 NO-SHOW FOR APPOINTMENT: Status: ACTIVE | Noted: 2021-10-27

## 2022-03-18 PROBLEM — M51.369 DDD (DEGENERATIVE DISC DISEASE), LUMBAR: Status: ACTIVE | Noted: 2018-01-24

## 2022-03-19 PROBLEM — Z79.891 CHRONIC USE OF OPIATE FOR THERAPEUTIC PURPOSE: Status: ACTIVE | Noted: 2020-11-30

## 2022-03-20 PROBLEM — M50.30 DDD (DEGENERATIVE DISC DISEASE), CERVICAL: Status: ACTIVE | Noted: 2020-11-30

## 2022-07-19 ENCOUNTER — HOSPITAL ENCOUNTER (OUTPATIENT)
Dept: LAB | Age: 72
Discharge: HOME | End: 2022-07-19

## 2022-07-19 PROCEDURE — 85025 COMPLETE CBC W/AUTO DIFF WBC: CPT

## 2022-07-19 PROCEDURE — 80061 LIPID PANEL: CPT

## 2022-07-19 PROCEDURE — 83036 HEMOGLOBIN GLYCOSYLATED A1C: CPT

## 2022-07-19 PROCEDURE — 80053 COMPREHEN METABOLIC PANEL: CPT

## 2022-07-19 PROCEDURE — 82306 VITAMIN D 25 HYDROXY: CPT

## 2022-07-20 LAB
25(OH)D3 SERPL-MCNC: 27.1 NG/ML (ref 30–100)
ALBUMIN SERPL-MCNC: 3.8 G/DL (ref 3.5–5)
ALBUMIN/GLOB SERPL: 1 {RATIO} (ref 1.1–2.2)
ALP SERPL-CCNC: 91 U/L (ref 45–117)
ALT SERPL-CCNC: 28 U/L (ref 12–78)
ANION GAP SERPL CALC-SCNC: 11 MMOL/L (ref 5–15)
AST SERPL-CCNC: 16 U/L (ref 15–37)
BASOPHILS # BLD: 0.1 K/UL (ref 0–0.1)
BASOPHILS NFR BLD: 1 % (ref 0–1)
BILIRUB SERPL-MCNC: 0.6 MG/DL (ref 0.2–1)
BUN SERPL-MCNC: 10 MG/DL (ref 6–20)
BUN/CREAT SERPL: 12 (ref 12–20)
CALCIUM SERPL-MCNC: 9.6 MG/DL (ref 8.5–10.1)
CHLORIDE SERPL-SCNC: 101 MMOL/L (ref 97–108)
CHOLEST SERPL-MCNC: 230 MG/DL
CO2 SERPL-SCNC: 28 MMOL/L (ref 21–32)
CREAT SERPL-MCNC: 0.86 MG/DL (ref 0.55–1.02)
DIFFERENTIAL METHOD BLD: ABNORMAL
EOSINOPHIL # BLD: 0.1 K/UL (ref 0–0.4)
EOSINOPHIL NFR BLD: 1 % (ref 0–7)
ERYTHROCYTE [DISTWIDTH] IN BLOOD BY AUTOMATED COUNT: 13.2 % (ref 11.5–14.5)
EST. AVERAGE GLUCOSE BLD GHB EST-MCNC: 117 MG/DL
GLOBULIN SER CALC-MCNC: 3.8 G/DL (ref 2–4)
GLUCOSE SERPL-MCNC: 125 MG/DL (ref 65–100)
HBA1C MFR BLD: 5.7 % (ref 4–5.6)
HCT VFR BLD AUTO: 48 % (ref 35–47)
HDLC SERPL-MCNC: 76 MG/DL
HDLC SERPL: 3 {RATIO} (ref 0–5)
HGB BLD-MCNC: 15.8 G/DL (ref 11.5–16)
IMM GRANULOCYTES # BLD AUTO: 0 K/UL (ref 0–0.04)
IMM GRANULOCYTES NFR BLD AUTO: 0 % (ref 0–0.5)
LDLC SERPL CALC-MCNC: 138.4 MG/DL (ref 0–100)
LYMPHOCYTES # BLD: 1.5 K/UL (ref 0.8–3.5)
LYMPHOCYTES NFR BLD: 18 % (ref 12–49)
MCH RBC QN AUTO: 29.7 PG (ref 26–34)
MCHC RBC AUTO-ENTMCNC: 32.9 G/DL (ref 30–36.5)
MCV RBC AUTO: 90.2 FL (ref 80–99)
MONOCYTES # BLD: 0.4 K/UL (ref 0–1)
MONOCYTES NFR BLD: 5 % (ref 5–13)
NEUTS SEG # BLD: 5.8 K/UL (ref 1.8–8)
NEUTS SEG NFR BLD: 75 % (ref 32–75)
NRBC # BLD: 0 K/UL (ref 0–0.01)
NRBC BLD-RTO: 0 PER 100 WBC
PLATELET # BLD AUTO: 220 K/UL (ref 150–400)
PMV BLD AUTO: 10.4 FL (ref 8.9–12.9)
POTASSIUM SERPL-SCNC: 4.6 MMOL/L (ref 3.5–5.1)
PROT SERPL-MCNC: 7.6 G/DL (ref 6.4–8.2)
RBC # BLD AUTO: 5.32 M/UL (ref 3.8–5.2)
SODIUM SERPL-SCNC: 140 MMOL/L (ref 136–145)
TRIGL SERPL-MCNC: 78 MG/DL (ref ?–150)
VLDLC SERPL CALC-MCNC: 15.6 MG/DL
WBC # BLD AUTO: 7.9 K/UL (ref 3.6–11)

## 2022-08-22 ENCOUNTER — APPOINTMENT (RX ONLY)
Dept: URBAN - METROPOLITAN AREA CLINIC 76 | Facility: CLINIC | Age: 72
Setting detail: DERMATOLOGY
End: 2022-08-22

## 2022-08-22 DIAGNOSIS — L82.1 OTHER SEBORRHEIC KERATOSIS: ICD-10-CM

## 2022-08-22 DIAGNOSIS — D22 MELANOCYTIC NEVI: ICD-10-CM

## 2022-08-22 DIAGNOSIS — L81.5 LEUKODERMA, NOT ELSEWHERE CLASSIFIED: ICD-10-CM | Status: STABLE

## 2022-08-22 DIAGNOSIS — L21.8 OTHER SEBORRHEIC DERMATITIS: ICD-10-CM | Status: IMPROVED

## 2022-08-22 DIAGNOSIS — L81.4 OTHER MELANIN HYPERPIGMENTATION: ICD-10-CM

## 2022-08-22 PROBLEM — D22.72 MELANOCYTIC NEVI OF LEFT LOWER LIMB, INCLUDING HIP: Status: ACTIVE | Noted: 2022-08-22

## 2022-08-22 PROBLEM — L81.9 DISORDER OF PIGMENTATION, UNSPECIFIED: Status: ACTIVE | Noted: 2022-08-22

## 2022-08-22 PROBLEM — D22.39 MELANOCYTIC NEVI OF OTHER PARTS OF FACE: Status: ACTIVE | Noted: 2022-08-22

## 2022-08-22 PROBLEM — D22.5 MELANOCYTIC NEVI OF TRUNK: Status: ACTIVE | Noted: 2022-08-22

## 2022-08-22 PROBLEM — D22.62 MELANOCYTIC NEVI OF LEFT UPPER LIMB, INCLUDING SHOULDER: Status: ACTIVE | Noted: 2022-08-22

## 2022-08-22 PROBLEM — D22.61 MELANOCYTIC NEVI OF RIGHT UPPER LIMB, INCLUDING SHOULDER: Status: ACTIVE | Noted: 2022-08-22

## 2022-08-22 PROBLEM — D22.71 MELANOCYTIC NEVI OF RIGHT LOWER LIMB, INCLUDING HIP: Status: ACTIVE | Noted: 2022-08-22

## 2022-08-22 PROCEDURE — 99213 OFFICE O/P EST LOW 20 MIN: CPT

## 2022-08-22 PROCEDURE — ? SUNSCREEN RECOMMENDATIONS

## 2022-08-22 PROCEDURE — ? COUNSELING

## 2022-08-22 ASSESSMENT — LOCATION SIMPLE DESCRIPTION DERM
LOCATION SIMPLE: RIGHT UPPER BACK
LOCATION SIMPLE: RIGHT SHOULDER
LOCATION SIMPLE: RIGHT CALF
LOCATION SIMPLE: LEFT CALF
LOCATION SIMPLE: LEFT FOREARM
LOCATION SIMPLE: LEFT THIGH
LOCATION SIMPLE: LEFT POSTERIOR UPPER ARM
LOCATION SIMPLE: RIGHT PRETIBIAL REGION
LOCATION SIMPLE: RIGHT FOREARM
LOCATION SIMPLE: UPPER BACK
LOCATION SIMPLE: LEFT UPPER ARM
LOCATION SIMPLE: RIGHT CHEEK
LOCATION SIMPLE: RIGHT UPPER ARM
LOCATION SIMPLE: SCALP
LOCATION SIMPLE: LEFT CHEEK
LOCATION SIMPLE: ABDOMEN
LOCATION SIMPLE: RIGHT FOREHEAD
LOCATION SIMPLE: LEFT PRETIBIAL REGION
LOCATION SIMPLE: LEFT SCALP
LOCATION SIMPLE: LEFT UPPER BACK
LOCATION SIMPLE: CHEST
LOCATION SIMPLE: LEFT SHOULDER

## 2022-08-22 ASSESSMENT — LOCATION DETAILED DESCRIPTION DERM
LOCATION DETAILED: RIGHT INFERIOR MEDIAL MALAR CHEEK
LOCATION DETAILED: RIGHT SUPERIOR FOREHEAD
LOCATION DETAILED: RIGHT INFERIOR UPPER BACK
LOCATION DETAILED: RIGHT PROXIMAL CALF
LOCATION DETAILED: RIGHT DISTAL PRETIBIAL REGION
LOCATION DETAILED: LEFT POSTERIOR SHOULDER
LOCATION DETAILED: RIGHT DISTAL DORSAL FOREARM
LOCATION DETAILED: LEFT PROXIMAL CALF
LOCATION DETAILED: LEFT ANTERIOR DISTAL THIGH
LOCATION DETAILED: RIGHT ANTERIOR PROXIMAL UPPER ARM
LOCATION DETAILED: LEFT PROXIMAL PRETIBIAL REGION
LOCATION DETAILED: LEFT ANTERIOR PROXIMAL UPPER ARM
LOCATION DETAILED: LEFT INFERIOR CENTRAL MALAR CHEEK
LOCATION DETAILED: RIGHT PROXIMAL PRETIBIAL REGION
LOCATION DETAILED: EPIGASTRIC SKIN
LOCATION DETAILED: RIGHT ANTERIOR SHOULDER
LOCATION DETAILED: LEFT DISTAL CALF
LOCATION DETAILED: RIGHT VENTRAL PROXIMAL FOREARM
LOCATION DETAILED: LEFT VENTRAL PROXIMAL FOREARM
LOCATION DETAILED: RIGHT POSTERIOR SHOULDER
LOCATION DETAILED: RIGHT PROXIMAL DORSAL FOREARM
LOCATION DETAILED: LEFT MEDIAL FRONTAL SCALP
LOCATION DETAILED: LEFT PROXIMAL DORSAL FOREARM
LOCATION DETAILED: LEFT PROXIMAL POSTERIOR UPPER ARM
LOCATION DETAILED: LEFT ANTERIOR SHOULDER
LOCATION DETAILED: STERNAL NOTCH
LOCATION DETAILED: LEFT CENTRAL FRONTAL SCALP
LOCATION DETAILED: RIGHT CENTRAL PARIETAL SCALP
LOCATION DETAILED: LEFT INFERIOR UPPER BACK
LOCATION DETAILED: RIGHT DISTAL CALF
LOCATION DETAILED: INFERIOR THORACIC SPINE
LOCATION DETAILED: LEFT SUPERIOR UPPER BACK
LOCATION DETAILED: LEFT DISTAL PRETIBIAL REGION
LOCATION DETAILED: RIGHT SUPERIOR UPPER BACK

## 2022-08-22 ASSESSMENT — LOCATION ZONE DERM
LOCATION ZONE: LEG
LOCATION ZONE: ARM
LOCATION ZONE: SCALP
LOCATION ZONE: FACE
LOCATION ZONE: TRUNK

## 2022-08-22 NOTE — PROCEDURE: SUNSCREEN RECOMMENDATIONS
Products Recommended: Elta MD
Detail Level: Detailed
General Sunscreen Counseling: I recommended a broad spectrum sunscreen with a SPF of 30 or higher. I explained that SPF 30 sunscreens block approximately 97 percent of the sun's harmful rays. Sunscreens should be applied at least 15 minutes prior to expected sun exposure and then every 2 hours after that as long as sun exposure continues. If swimming or exercising sunscreen should be reapplied every 45 minutes to an hour after getting wet or sweating. One ounce, or the equivalent of a shot glass full of sunscreen, is adequate to protect the skin not covered by a bathing suit. I also recommended a lip balm with a sunscreen as well. Sun protective clothing can be used in lieu of sunscreen but must be worn the entire time you are exposed to the sun's rays.

## 2023-04-22 DIAGNOSIS — Z12.31 VISIT FOR SCREENING MAMMOGRAM: Primary | ICD-10-CM

## 2023-04-26 ENCOUNTER — HOSPITAL ENCOUNTER (OUTPATIENT)
Dept: MAMMOGRAPHY | Age: 73
Discharge: HOME | End: 2023-04-26
Attending: FAMILY MEDICINE
Payer: MEDICARE

## 2023-04-26 VITALS — WEIGHT: 182 LBS | BODY MASS INDEX: 29.38 KG/M2

## 2023-04-26 DIAGNOSIS — Z12.31 VISIT FOR SCREENING MAMMOGRAM: ICD-10-CM

## 2023-04-26 PROCEDURE — 77063 BREAST TOMOSYNTHESIS BI: CPT

## 2023-08-22 ENCOUNTER — APPOINTMENT (RX ONLY)
Dept: URBAN - METROPOLITAN AREA CLINIC 76 | Facility: CLINIC | Age: 73
Setting detail: DERMATOLOGY
End: 2023-08-22

## 2023-08-22 DIAGNOSIS — L81.4 OTHER MELANIN HYPERPIGMENTATION: ICD-10-CM

## 2023-08-22 DIAGNOSIS — D22 MELANOCYTIC NEVI: ICD-10-CM

## 2023-08-22 DIAGNOSIS — L81.5 LEUKODERMA, NOT ELSEWHERE CLASSIFIED: ICD-10-CM

## 2023-08-22 DIAGNOSIS — L21.8 OTHER SEBORRHEIC DERMATITIS: ICD-10-CM | Status: INADEQUATELY CONTROLLED

## 2023-08-22 DIAGNOSIS — L30.4 ERYTHEMA INTERTRIGO: ICD-10-CM

## 2023-08-22 PROBLEM — D22.39 MELANOCYTIC NEVI OF OTHER PARTS OF FACE: Status: ACTIVE | Noted: 2023-08-22

## 2023-08-22 PROBLEM — D22.71 MELANOCYTIC NEVI OF RIGHT LOWER LIMB, INCLUDING HIP: Status: ACTIVE | Noted: 2023-08-22

## 2023-08-22 PROBLEM — D22.5 MELANOCYTIC NEVI OF TRUNK: Status: ACTIVE | Noted: 2023-08-22

## 2023-08-22 PROBLEM — D22.62 MELANOCYTIC NEVI OF LEFT UPPER LIMB, INCLUDING SHOULDER: Status: ACTIVE | Noted: 2023-08-22

## 2023-08-22 PROBLEM — D22.61 MELANOCYTIC NEVI OF RIGHT UPPER LIMB, INCLUDING SHOULDER: Status: ACTIVE | Noted: 2023-08-22

## 2023-08-22 PROBLEM — D22.72 MELANOCYTIC NEVI OF LEFT LOWER LIMB, INCLUDING HIP: Status: ACTIVE | Noted: 2023-08-22

## 2023-08-22 PROCEDURE — ? SUNSCREEN RECOMMENDATIONS

## 2023-08-22 PROCEDURE — 99214 OFFICE O/P EST MOD 30 MIN: CPT

## 2023-08-22 PROCEDURE — ? PRESCRIPTION

## 2023-08-22 PROCEDURE — ? TREATMENT REGIMEN

## 2023-08-22 PROCEDURE — ? COUNSELING

## 2023-08-22 RX ORDER — CLOBETASOL PROPIONATE 0.5 MG/ML
SOLUTION TOPICAL QD
Qty: 50 | Refills: 6 | Status: ERX | COMMUNITY
Start: 2023-08-22

## 2023-08-22 RX ORDER — KETOCONAZOLE 20 MG/ML
SHAMPOO, SUSPENSION TOPICAL QD
Qty: 120 | Refills: 11 | Status: ERX | COMMUNITY
Start: 2023-08-22

## 2023-08-22 RX ADMIN — KETOCONAZOLE: 20 SHAMPOO, SUSPENSION TOPICAL at 00:00

## 2023-08-22 RX ADMIN — CLOBETASOL PROPIONATE: 0.5 SOLUTION TOPICAL at 00:00

## 2023-08-22 ASSESSMENT — LOCATION DETAILED DESCRIPTION DERM
LOCATION DETAILED: LEFT POSTERIOR SHOULDER
LOCATION DETAILED: RIGHT VENTRAL PROXIMAL FOREARM
LOCATION DETAILED: LEFT DISTAL PRETIBIAL REGION
LOCATION DETAILED: RIGHT SUPERIOR UPPER BACK
LOCATION DETAILED: LEFT DISTAL POSTERIOR UPPER ARM
LOCATION DETAILED: LEFT PROXIMAL PRETIBIAL REGION
LOCATION DETAILED: LEFT ANTERIOR PROXIMAL UPPER ARM
LOCATION DETAILED: RIGHT INFERIOR UPPER BACK
LOCATION DETAILED: RIGHT ANTERIOR PROXIMAL UPPER ARM
LOCATION DETAILED: LEFT PROXIMAL CALF
LOCATION DETAILED: RIGHT POSTERIOR SHOULDER
LOCATION DETAILED: LEFT DISTAL CALF
LOCATION DETAILED: RIGHT DISTAL POSTERIOR UPPER ARM
LOCATION DETAILED: LEFT PROXIMAL DORSAL FOREARM
LOCATION DETAILED: STERNAL NOTCH
LOCATION DETAILED: LEFT ANTERIOR SHOULDER
LOCATION DETAILED: RIGHT PROXIMAL CALF
LOCATION DETAILED: RIGHT CENTRAL PARIETAL SCALP
LOCATION DETAILED: LEFT CENTRAL FRONTAL SCALP
LOCATION DETAILED: RIGHT DISTAL PRETIBIAL REGION
LOCATION DETAILED: LEFT INFERIOR UPPER BACK
LOCATION DETAILED: RIGHT INFERIOR MEDIAL MALAR CHEEK
LOCATION DETAILED: RIGHT INFRAMAMMARY CREASE (INNER QUADRANT)
LOCATION DETAILED: LEFT ANTERIOR DISTAL THIGH
LOCATION DETAILED: LEFT SUPERIOR UPPER BACK
LOCATION DETAILED: RIGHT PROXIMAL DORSAL FOREARM
LOCATION DETAILED: RIGHT DISTAL POSTERIOR THIGH
LOCATION DETAILED: RIGHT ANTERIOR SHOULDER
LOCATION DETAILED: LEFT INFERIOR CENTRAL MALAR CHEEK
LOCATION DETAILED: RIGHT ANTERIOR DISTAL THIGH
LOCATION DETAILED: RIGHT SUPERIOR FOREHEAD
LOCATION DETAILED: RIGHT PROXIMAL PRETIBIAL REGION
LOCATION DETAILED: LEFT MEDIAL FRONTAL SCALP
LOCATION DETAILED: LEFT DISTAL POSTERIOR THIGH
LOCATION DETAILED: LEFT DISTAL DORSAL FOREARM
LOCATION DETAILED: LEFT VENTRAL PROXIMAL FOREARM
LOCATION DETAILED: RIGHT DISTAL CALF

## 2023-08-22 ASSESSMENT — LOCATION SIMPLE DESCRIPTION DERM
LOCATION SIMPLE: LEFT FOREARM
LOCATION SIMPLE: RIGHT UPPER BACK
LOCATION SIMPLE: LEFT UPPER BACK
LOCATION SIMPLE: RIGHT POSTERIOR THIGH
LOCATION SIMPLE: RIGHT UPPER ARM
LOCATION SIMPLE: LEFT SHOULDER
LOCATION SIMPLE: RIGHT BREAST
LOCATION SIMPLE: LEFT UPPER ARM
LOCATION SIMPLE: RIGHT SHOULDER
LOCATION SIMPLE: LEFT THIGH
LOCATION SIMPLE: RIGHT FOREHEAD
LOCATION SIMPLE: RIGHT PRETIBIAL REGION
LOCATION SIMPLE: RIGHT THIGH
LOCATION SIMPLE: LEFT CHEEK
LOCATION SIMPLE: CHEST
LOCATION SIMPLE: SCALP
LOCATION SIMPLE: LEFT SCALP
LOCATION SIMPLE: RIGHT POSTERIOR UPPER ARM
LOCATION SIMPLE: LEFT POSTERIOR UPPER ARM
LOCATION SIMPLE: RIGHT CHEEK
LOCATION SIMPLE: LEFT PRETIBIAL REGION
LOCATION SIMPLE: RIGHT FOREARM
LOCATION SIMPLE: LEFT POSTERIOR THIGH
LOCATION SIMPLE: LEFT CALF
LOCATION SIMPLE: RIGHT CALF

## 2023-08-22 ASSESSMENT — LOCATION ZONE DERM
LOCATION ZONE: SCALP
LOCATION ZONE: LEG
LOCATION ZONE: ARM
LOCATION ZONE: TRUNK
LOCATION ZONE: FACE

## 2023-08-22 ASSESSMENT — BSA RASH: BSA RASH: 2

## 2023-08-22 ASSESSMENT — SEVERITY ASSESSMENT: SEVERITY: MILD

## 2023-08-22 NOTE — HPI: RASH (SEBORRHEIC DERMATITIS)
How Severe Is It?: moderate
Is This A New Presentation, Or A Follow-Up?: Follow Up Seborrheic Dermatitis
Additional History: Pt states she is not using the Clobetasol solution very much.

## 2023-08-22 NOTE — PROCEDURE: TREATMENT REGIMEN
Detail Level: Zone
Plan: Discussed being more diligent about washing hair and applying Clobetasol more often.

## 2023-08-22 NOTE — PROCEDURE: COUNSELING
Detail Level: Zone
Patient Specific Counseling (Will Not Stick From Patient To Patient): Declines treatment options.

## 2023-08-28 ENCOUNTER — HOSPITAL ENCOUNTER (OUTPATIENT)
Facility: HOSPITAL | Age: 73
Setting detail: SPECIMEN
Discharge: HOME | End: 2023-08-31

## 2023-08-28 LAB
ALBUMIN SERPL-MCNC: 3.6 G/DL (ref 3.5–5)
ALBUMIN/GLOB SERPL: 1 (ref 1.1–2.2)
ALP SERPL-CCNC: 91 U/L (ref 45–117)
ALT SERPL-CCNC: 24 U/L (ref 12–78)
ANION GAP SERPL CALC-SCNC: 6 MMOL/L (ref 5–15)
AST SERPL-CCNC: 18 U/L (ref 15–37)
BASOPHILS # BLD: 0.1 K/UL (ref 0–0.1)
BASOPHILS NFR BLD: 1 % (ref 0–1)
BILIRUB SERPL-MCNC: 0.5 MG/DL (ref 0.2–1)
BUN SERPL-MCNC: 9 MG/DL (ref 6–20)
BUN/CREAT SERPL: 11 (ref 12–20)
CALCIUM SERPL-MCNC: 9.3 MG/DL (ref 8.5–10.1)
CHLORIDE SERPL-SCNC: 101 MMOL/L (ref 97–108)
CHOLEST SERPL-MCNC: 207 MG/DL
CO2 SERPL-SCNC: 31 MMOL/L (ref 21–32)
CREAT SERPL-MCNC: 0.82 MG/DL (ref 0.55–1.02)
DIFFERENTIAL METHOD BLD: NORMAL
EOSINOPHIL # BLD: 0.1 K/UL (ref 0–0.4)
EOSINOPHIL NFR BLD: 2 % (ref 0–7)
ERYTHROCYTE [DISTWIDTH] IN BLOOD BY AUTOMATED COUNT: 13.2 % (ref 11.5–14.5)
EST. AVERAGE GLUCOSE BLD GHB EST-MCNC: 117 MG/DL
GLOBULIN SER CALC-MCNC: 3.7 G/DL (ref 2–4)
GLUCOSE SERPL-MCNC: 147 MG/DL (ref 65–100)
HBA1C MFR BLD: 5.7 % (ref 4–5.6)
HCT VFR BLD AUTO: 46.5 % (ref 35–47)
HDLC SERPL-MCNC: 78 MG/DL
HDLC SERPL: 2.7 (ref 0–5)
HGB BLD-MCNC: 14.9 G/DL (ref 11.5–16)
IMM GRANULOCYTES # BLD AUTO: 0 K/UL (ref 0–0.04)
IMM GRANULOCYTES NFR BLD AUTO: 0 % (ref 0–0.5)
IRON SATN MFR SERPL: 28 % (ref 20–50)
IRON SERPL-MCNC: 104 UG/DL (ref 50–170)
LDLC SERPL CALC-MCNC: 116.8 MG/DL (ref 0–100)
LYMPHOCYTES # BLD: 1.4 K/UL (ref 0.8–3.5)
LYMPHOCYTES NFR BLD: 23 % (ref 12–49)
MCH RBC QN AUTO: 29.2 PG (ref 26–34)
MCHC RBC AUTO-ENTMCNC: 32 G/DL (ref 30–36.5)
MCV RBC AUTO: 91.2 FL (ref 80–99)
MONOCYTES # BLD: 0.4 K/UL (ref 0–1)
MONOCYTES NFR BLD: 6 % (ref 5–13)
NEUTS SEG # BLD: 4.2 K/UL (ref 1.8–8)
NEUTS SEG NFR BLD: 68 % (ref 32–75)
NRBC # BLD: 0 K/UL (ref 0–0.01)
NRBC BLD-RTO: 0 PER 100 WBC
PLATELET # BLD AUTO: 218 K/UL (ref 150–400)
PMV BLD AUTO: 10.7 FL (ref 8.9–12.9)
POTASSIUM SERPL-SCNC: 4.3 MMOL/L (ref 3.5–5.1)
PROT SERPL-MCNC: 7.3 G/DL (ref 6.4–8.2)
RBC # BLD AUTO: 5.1 M/UL (ref 3.8–5.2)
SODIUM SERPL-SCNC: 138 MMOL/L (ref 136–145)
TIBC SERPL-MCNC: 367 UG/DL (ref 250–450)
TRIGL SERPL-MCNC: 61 MG/DL
TSH SERPL DL<=0.05 MIU/L-ACNC: 0.82 UIU/ML (ref 0.36–3.74)
VLDLC SERPL CALC-MCNC: 12.2 MG/DL
WBC # BLD AUTO: 6.2 K/UL (ref 3.6–11)

## 2023-08-28 PROCEDURE — 84443 ASSAY THYROID STIM HORMONE: CPT

## 2023-08-28 PROCEDURE — 83540 ASSAY OF IRON: CPT

## 2023-08-28 PROCEDURE — 80061 LIPID PANEL: CPT

## 2023-08-28 PROCEDURE — 80053 COMPREHEN METABOLIC PANEL: CPT

## 2023-08-28 PROCEDURE — 82306 VITAMIN D 25 HYDROXY: CPT

## 2023-08-28 PROCEDURE — 85025 COMPLETE CBC W/AUTO DIFF WBC: CPT

## 2023-08-28 PROCEDURE — 83036 HEMOGLOBIN GLYCOSYLATED A1C: CPT

## 2023-08-28 PROCEDURE — 82607 VITAMIN B-12: CPT

## 2023-08-28 PROCEDURE — 83550 IRON BINDING TEST: CPT

## 2023-08-29 LAB — 25(OH)D3 SERPL-MCNC: 17.9 NG/ML (ref 30–100)

## 2023-08-30 LAB — VIT B12 SERPL-MCNC: 496 PG/ML (ref 193–986)

## 2024-05-02 ENCOUNTER — HOSPITAL ENCOUNTER (OUTPATIENT)
Facility: HOSPITAL | Age: 74
Setting detail: SPECIMEN
Discharge: HOME | End: 2024-05-05

## 2024-05-02 PROCEDURE — 80061 LIPID PANEL: CPT

## 2024-05-02 PROCEDURE — 85025 COMPLETE CBC W/AUTO DIFF WBC: CPT

## 2024-05-02 PROCEDURE — 83540 ASSAY OF IRON: CPT

## 2024-05-02 PROCEDURE — 84443 ASSAY THYROID STIM HORMONE: CPT

## 2024-05-02 PROCEDURE — 83036 HEMOGLOBIN GLYCOSYLATED A1C: CPT

## 2024-05-02 PROCEDURE — 82306 VITAMIN D 25 HYDROXY: CPT

## 2024-05-02 PROCEDURE — 83550 IRON BINDING TEST: CPT

## 2024-05-02 PROCEDURE — 80053 COMPREHEN METABOLIC PANEL: CPT

## 2024-05-03 LAB
ALBUMIN SERPL-MCNC: 3.8 G/DL (ref 3.5–5)
ALBUMIN/GLOB SERPL: 1 (ref 1.1–2.2)
ALP SERPL-CCNC: 88 U/L (ref 45–117)
ALT SERPL-CCNC: 27 U/L (ref 12–78)
ANION GAP SERPL CALC-SCNC: 7 MMOL/L (ref 5–15)
AST SERPL-CCNC: 25 U/L (ref 15–37)
BASOPHILS # BLD: 0.1 K/UL (ref 0–0.1)
BASOPHILS NFR BLD: 1 % (ref 0–1)
BILIRUB SERPL-MCNC: 0.5 MG/DL (ref 0.2–1)
BUN SERPL-MCNC: 13 MG/DL (ref 6–20)
BUN/CREAT SERPL: 16 (ref 12–20)
CALCIUM SERPL-MCNC: 9.6 MG/DL (ref 8.5–10.1)
CHLORIDE SERPL-SCNC: 100 MMOL/L (ref 97–108)
CHOLEST SERPL-MCNC: 239 MG/DL
CO2 SERPL-SCNC: 33 MMOL/L (ref 21–32)
CREAT SERPL-MCNC: 0.82 MG/DL (ref 0.55–1.02)
DIFFERENTIAL METHOD BLD: ABNORMAL
EOSINOPHIL # BLD: 0.2 K/UL (ref 0–0.4)
EOSINOPHIL NFR BLD: 2 % (ref 0–7)
ERYTHROCYTE [DISTWIDTH] IN BLOOD BY AUTOMATED COUNT: 13.9 % (ref 11.5–14.5)
EST. AVERAGE GLUCOSE BLD GHB EST-MCNC: 111 MG/DL
GLOBULIN SER CALC-MCNC: 3.7 G/DL (ref 2–4)
GLUCOSE SERPL-MCNC: 89 MG/DL (ref 65–100)
HBA1C MFR BLD: 5.5 % (ref 4–5.6)
HCT VFR BLD AUTO: 49.3 % (ref 35–47)
HDLC SERPL-MCNC: 79 MG/DL
HDLC SERPL: 3 (ref 0–5)
HGB BLD-MCNC: 15.7 G/DL (ref 11.5–16)
IMM GRANULOCYTES # BLD AUTO: 0 K/UL (ref 0–0.04)
IMM GRANULOCYTES NFR BLD AUTO: 0 % (ref 0–0.5)
IRON SATN MFR SERPL: 24 % (ref 20–50)
IRON SERPL-MCNC: 87 UG/DL (ref 50–170)
LDLC SERPL CALC-MCNC: 145.6 MG/DL (ref 0–100)
LYMPHOCYTES # BLD: 1.9 K/UL (ref 0.8–3.5)
LYMPHOCYTES NFR BLD: 30 % (ref 12–49)
MCH RBC QN AUTO: 28.9 PG (ref 26–34)
MCHC RBC AUTO-ENTMCNC: 31.8 G/DL (ref 30–36.5)
MCV RBC AUTO: 90.6 FL (ref 80–99)
MONOCYTES # BLD: 0.5 K/UL (ref 0–1)
MONOCYTES NFR BLD: 8 % (ref 5–13)
NEUTS SEG # BLD: 3.6 K/UL (ref 1.8–8)
NEUTS SEG NFR BLD: 59 % (ref 32–75)
NRBC # BLD: 0.02 K/UL (ref 0–0.01)
NRBC BLD-RTO: 0.3 PER 100 WBC
PLATELET # BLD AUTO: 208 K/UL (ref 150–400)
PMV BLD AUTO: 10.4 FL (ref 8.9–12.9)
POTASSIUM SERPL-SCNC: 4.3 MMOL/L (ref 3.5–5.1)
PROT SERPL-MCNC: 7.5 G/DL (ref 6.4–8.2)
RBC # BLD AUTO: 5.44 M/UL (ref 3.8–5.2)
SODIUM SERPL-SCNC: 140 MMOL/L (ref 136–145)
TIBC SERPL-MCNC: 367 UG/DL (ref 250–450)
TRIGL SERPL-MCNC: 72 MG/DL
TSH SERPL DL<=0.05 MIU/L-ACNC: 0.86 UIU/ML (ref 0.36–3.74)
VLDLC SERPL CALC-MCNC: 14.4 MG/DL
WBC # BLD AUTO: 6.2 K/UL (ref 3.6–11)

## 2024-06-07 ENCOUNTER — RX ONLY (OUTPATIENT)
Age: 74
Setting detail: RX ONLY
End: 2024-06-07

## 2024-06-07 ENCOUNTER — APPOINTMENT (RX ONLY)
Dept: URBAN - METROPOLITAN AREA CLINIC 76 | Facility: CLINIC | Age: 74
Setting detail: DERMATOLOGY
End: 2024-06-07

## 2024-06-07 DIAGNOSIS — L57.0 ACTINIC KERATOSIS: ICD-10-CM | Status: INADEQUATELY CONTROLLED

## 2024-06-07 DIAGNOSIS — L21.8 OTHER SEBORRHEIC DERMATITIS: ICD-10-CM

## 2024-06-07 PROCEDURE — ? TREATMENT REGIMEN

## 2024-06-07 PROCEDURE — 99212 OFFICE O/P EST SF 10 MIN: CPT | Mod: 25

## 2024-06-07 PROCEDURE — ? COUNSELING

## 2024-06-07 PROCEDURE — 17000 DESTRUCT PREMALG LESION: CPT

## 2024-06-07 PROCEDURE — ? LIQUID NITROGEN

## 2024-06-07 RX ORDER — CLOBETASOL PROPIONATE 0.5 MG/ML
SOLUTION TOPICAL QD
Qty: 50 | Refills: 6 | Status: ERX

## 2024-06-07 ASSESSMENT — LOCATION ZONE DERM
LOCATION ZONE: SCALP
LOCATION ZONE: FACE
LOCATION ZONE: LIP

## 2024-06-07 ASSESSMENT — TOTAL NUMBER OF LESIONS: # OF LESIONS?: 1

## 2024-06-07 ASSESSMENT — LOCATION SIMPLE DESCRIPTION DERM
LOCATION SIMPLE: LEFT SCALP
LOCATION SIMPLE: LEFT LIP
LOCATION SIMPLE: RIGHT FOREHEAD
LOCATION SIMPLE: SCALP

## 2024-06-07 ASSESSMENT — LOCATION DETAILED DESCRIPTION DERM
LOCATION DETAILED: LEFT MEDIAL FRONTAL SCALP
LOCATION DETAILED: RIGHT SUPERIOR FOREHEAD
LOCATION DETAILED: RIGHT CENTRAL PARIETAL SCALP
LOCATION DETAILED: LEFT CENTRAL FRONTAL SCALP
LOCATION DETAILED: LEFT INFERIOR VERMILION LIP

## 2024-06-07 NOTE — PROCEDURE: LIQUID NITROGEN
Detail Level: Zone
Show Aperture Variable?: Yes
Duration Of Freeze Thaw-Cycle (Seconds): 5
Render Note In Bullet Format When Appropriate: No
Number Of Freeze-Thaw Cycles: 2 freeze-thaw cycles
Post-Care Instructions: I reviewed with the patient in detail post-care instructions. Patient is to wear sunprotection, and avoid picking at any of the treated lesions. Pt may apply Vaseline to crusted or scabbing areas.
Application Tool (Optional): Cry-AC
Consent: The patient's consent was obtained including but not limited to risks of crusting, scabbing, blistering, scarring, darker or lighter pigmentary change, recurrence, incomplete removal and infection.

## 2024-06-07 NOTE — HPI: SKIN LESION
How Severe Is Your Skin Lesion?: mild
Is This A New Presentation, Or A Follow-Up?: Skin Lesion
Additional History: She was not aware of it, her daughter noticed it.

## 2024-06-10 ENCOUNTER — RX ONLY (OUTPATIENT)
Age: 74
Setting detail: RX ONLY
End: 2024-06-10

## 2024-06-10 RX ORDER — CLOBETASOL PROPIONATE 0.5 MG/ML
SOLUTION TOPICAL QD
Qty: 50 | Refills: 6 | Status: ERX

## 2024-08-02 DIAGNOSIS — Z78.0 ASYMPTOMATIC MENOPAUSAL STATE: Primary | ICD-10-CM

## 2024-08-02 DIAGNOSIS — G62.9 NEUROPATHY: Primary | ICD-10-CM

## 2024-08-02 DIAGNOSIS — G62.9 NEUROPATHY: ICD-10-CM

## 2024-08-02 DIAGNOSIS — I73.9 PAD (PERIPHERAL ARTERY DISEASE) (HCC): Primary | ICD-10-CM

## 2024-08-02 DIAGNOSIS — I73.9 PAD (PERIPHERAL ARTERY DISEASE) (HCC): ICD-10-CM

## 2024-08-02 DIAGNOSIS — Z78.0 ASYMPTOMATIC MENOPAUSAL STATE: ICD-10-CM

## 2024-08-15 ENCOUNTER — HOSPITAL ENCOUNTER (OUTPATIENT)
Facility: HOSPITAL | Age: 74
Discharge: HOME | End: 2024-08-15
Payer: COMMERCIAL

## 2024-08-15 DIAGNOSIS — I73.9 PAD (PERIPHERAL ARTERY DISEASE) (HCC): ICD-10-CM

## 2024-08-15 DIAGNOSIS — G62.9 NEUROPATHY: ICD-10-CM

## 2024-08-15 LAB
VAS LEFT ABI: 0.95
VAS LEFT ARM BP: 149 MMHG
VAS LEFT DORSALIS PEDIS BP: 141 MMHG
VAS LEFT PTA BP: 138 MMHG
VAS RIGHT ABI: 1.02
VAS RIGHT ARM BP: 149 MMHG
VAS RIGHT DORSALIS PEDIS BP: 152 MMHG
VAS RIGHT PTA BP: 132 MMHG

## 2024-08-15 PROCEDURE — 93922 UPR/L XTREMITY ART 2 LEVELS: CPT

## 2024-08-19 ENCOUNTER — HOSPITAL ENCOUNTER (OUTPATIENT)
Facility: HOSPITAL | Age: 74
Discharge: HOME | End: 2024-08-22
Payer: COMMERCIAL

## 2024-08-19 DIAGNOSIS — Z78.0 ASYMPTOMATIC MENOPAUSAL STATE: ICD-10-CM

## 2024-08-19 PROCEDURE — 77080 DXA BONE DENSITY AXIAL: CPT

## 2024-09-09 ENCOUNTER — APPOINTMENT (RX ONLY)
Dept: URBAN - METROPOLITAN AREA CLINIC 76 | Facility: CLINIC | Age: 74
Setting detail: DERMATOLOGY
End: 2024-09-09

## 2024-09-09 ENCOUNTER — TRANSCRIBE ORDERS (OUTPATIENT)
Facility: HOSPITAL | Age: 74
End: 2024-09-09

## 2024-09-09 DIAGNOSIS — I65.23 CAROTID STENOSIS, BILATERAL: Primary | ICD-10-CM

## 2024-09-09 DIAGNOSIS — L21.8 OTHER SEBORRHEIC DERMATITIS: ICD-10-CM | Status: IMPROVED

## 2024-09-09 DIAGNOSIS — L85.3 XEROSIS CUTIS: ICD-10-CM

## 2024-09-09 DIAGNOSIS — D22 MELANOCYTIC NEVI: ICD-10-CM

## 2024-09-09 DIAGNOSIS — L81.4 OTHER MELANIN HYPERPIGMENTATION: ICD-10-CM

## 2024-09-09 DIAGNOSIS — L81.5 LEUKODERMA, NOT ELSEWHERE CLASSIFIED: ICD-10-CM

## 2024-09-09 DIAGNOSIS — L30.4 ERYTHEMA INTERTRIGO: ICD-10-CM | Status: WELL CONTROLLED

## 2024-09-09 PROBLEM — D22.72 MELANOCYTIC NEVI OF LEFT LOWER LIMB, INCLUDING HIP: Status: ACTIVE | Noted: 2024-09-09

## 2024-09-09 PROBLEM — D22.39 MELANOCYTIC NEVI OF OTHER PARTS OF FACE: Status: ACTIVE | Noted: 2024-09-09

## 2024-09-09 PROBLEM — D22.71 MELANOCYTIC NEVI OF RIGHT LOWER LIMB, INCLUDING HIP: Status: ACTIVE | Noted: 2024-09-09

## 2024-09-09 PROBLEM — D22.62 MELANOCYTIC NEVI OF LEFT UPPER LIMB, INCLUDING SHOULDER: Status: ACTIVE | Noted: 2024-09-09

## 2024-09-09 PROBLEM — D22.61 MELANOCYTIC NEVI OF RIGHT UPPER LIMB, INCLUDING SHOULDER: Status: ACTIVE | Noted: 2024-09-09

## 2024-09-09 PROBLEM — D22.5 MELANOCYTIC NEVI OF TRUNK: Status: ACTIVE | Noted: 2024-09-09

## 2024-09-09 PROCEDURE — ? SUNSCREEN RECOMMENDATIONS

## 2024-09-09 PROCEDURE — ? COUNSELING

## 2024-09-09 PROCEDURE — 99214 OFFICE O/P EST MOD 30 MIN: CPT

## 2024-09-09 PROCEDURE — ? PRESCRIPTION

## 2024-09-09 RX ORDER — KETOCONAZOLE 20 MG/ML
SHAMPOO, SUSPENSION TOPICAL
Qty: 120 | Refills: 11 | Status: ERX

## 2024-09-09 ASSESSMENT — LOCATION ZONE DERM
LOCATION ZONE: ARM
LOCATION ZONE: TRUNK
LOCATION ZONE: FACE
LOCATION ZONE: SCALP
LOCATION ZONE: LEG

## 2024-09-09 ASSESSMENT — LOCATION DETAILED DESCRIPTION DERM
LOCATION DETAILED: RIGHT CENTRAL PARIETAL SCALP
LOCATION DETAILED: LEFT MEDIAL FRONTAL SCALP
LOCATION DETAILED: LEFT SUPERIOR UPPER BACK
LOCATION DETAILED: LEFT VENTRAL PROXIMAL FOREARM
LOCATION DETAILED: RIGHT SUPERIOR UPPER BACK
LOCATION DETAILED: LEFT PROXIMAL CALF
LOCATION DETAILED: LEFT CENTRAL FRONTAL SCALP
LOCATION DETAILED: LEFT DISTAL PRETIBIAL REGION
LOCATION DETAILED: RIGHT DISTAL CALF
LOCATION DETAILED: STERNAL NOTCH
LOCATION DETAILED: RIGHT ANTERIOR SHOULDER
LOCATION DETAILED: LEFT POSTERIOR SHOULDER
LOCATION DETAILED: RIGHT PROXIMAL PRETIBIAL REGION
LOCATION DETAILED: RIGHT SUPERIOR FOREHEAD
LOCATION DETAILED: RIGHT PROXIMAL DORSAL FOREARM
LOCATION DETAILED: RIGHT INFERIOR UPPER BACK
LOCATION DETAILED: LEFT ANTERIOR PROXIMAL UPPER ARM
LOCATION DETAILED: LEFT PROXIMAL PRETIBIAL REGION
LOCATION DETAILED: LEFT INFERIOR UPPER BACK
LOCATION DETAILED: RIGHT POSTERIOR SHOULDER
LOCATION DETAILED: LEFT ANTERIOR SHOULDER
LOCATION DETAILED: RIGHT ANTERIOR PROXIMAL UPPER ARM
LOCATION DETAILED: LEFT INFERIOR CENTRAL MALAR CHEEK
LOCATION DETAILED: RIGHT INFERIOR MEDIAL MALAR CHEEK
LOCATION DETAILED: RIGHT PROXIMAL CALF
LOCATION DETAILED: RIGHT VENTRAL PROXIMAL FOREARM
LOCATION DETAILED: LEFT DISTAL CALF
LOCATION DETAILED: RIGHT INFRAMAMMARY CREASE (INNER QUADRANT)
LOCATION DETAILED: RIGHT DISTAL PRETIBIAL REGION
LOCATION DETAILED: LEFT PROXIMAL DORSAL FOREARM

## 2024-09-09 ASSESSMENT — LOCATION SIMPLE DESCRIPTION DERM
LOCATION SIMPLE: LEFT UPPER ARM
LOCATION SIMPLE: LEFT SHOULDER
LOCATION SIMPLE: LEFT PRETIBIAL REGION
LOCATION SIMPLE: SCALP
LOCATION SIMPLE: RIGHT SHOULDER
LOCATION SIMPLE: RIGHT FOREHEAD
LOCATION SIMPLE: CHEST
LOCATION SIMPLE: LEFT SCALP
LOCATION SIMPLE: LEFT UPPER BACK
LOCATION SIMPLE: LEFT CHEEK
LOCATION SIMPLE: RIGHT PRETIBIAL REGION
LOCATION SIMPLE: LEFT CALF
LOCATION SIMPLE: RIGHT CHEEK
LOCATION SIMPLE: RIGHT UPPER BACK
LOCATION SIMPLE: RIGHT FOREARM
LOCATION SIMPLE: RIGHT UPPER ARM
LOCATION SIMPLE: LEFT FOREARM
LOCATION SIMPLE: RIGHT BREAST
LOCATION SIMPLE: RIGHT CALF

## 2024-09-09 NOTE — PROCEDURE: COUNSELING
Detail Level: Zone
Patient Specific Counseling (Will Not Stick From Patient To Patient): Declines treatment options.
Moisturizer Recommendations: CeraVe moisturizing cream and facial moisturizer

## 2024-09-09 NOTE — PROCEDURE: MIPS QUALITY
Quality 130: Documentation Of Current Medications In The Medical Record: Current Medications Documented
Quality 226: Preventive Care And Screening: Tobacco Use: Screening And Cessation Intervention: Patient screened for tobacco use and is an ex/non-smoker
Detail Level: Generalized
Quality 47: Advance Care Plan: Advance Care Planning discussed and documented; advance care plan or surrogate decision maker documented in the medical record.

## 2024-10-07 ENCOUNTER — TRANSCRIBE ORDERS (OUTPATIENT)
Facility: HOSPITAL | Age: 74
End: 2024-10-07

## 2024-10-07 DIAGNOSIS — Z12.31 SCREENING MAMMOGRAM FOR BREAST CANCER: Primary | ICD-10-CM

## 2025-04-21 ENCOUNTER — HOSPITAL ENCOUNTER (OUTPATIENT)
Facility: HOSPITAL | Age: 75
Discharge: HOME | End: 2025-04-24
Payer: MEDICARE

## 2025-04-21 DIAGNOSIS — I65.23 CAROTID STENOSIS, BILATERAL: ICD-10-CM

## 2025-04-21 PROCEDURE — 93880 EXTRACRANIAL BILAT STUDY: CPT

## 2025-04-22 LAB
VAS LEFT CCA DIST EDV: 11.6 CM/S
VAS LEFT CCA DIST PSV: 66.8 CM/S
VAS LEFT CCA PROX EDV: 12.6 CM/S
VAS LEFT CCA PROX PSV: 87.9 CM/S
VAS LEFT ECA EDV: 9.7 CM/S
VAS LEFT ECA PSV: 104.3 CM/S
VAS LEFT ICA DIST EDV: 20 CM/S
VAS LEFT ICA DIST PSV: 93.7 CM/S
VAS LEFT ICA MID EDV: 10.2 CM/S
VAS LEFT ICA MID PSV: 57.3 CM/S
VAS LEFT ICA PROX EDV: 15.6 CM/S
VAS LEFT ICA PROX PSV: 62.9 CM/S
VAS LEFT VERTEBRAL EDV: 15.6 CM/S
VAS LEFT VERTEBRAL PSV: 90.5 CM/S
VAS RIGHT CCA DIST EDV: 6.9 CM/S
VAS RIGHT CCA DIST PSV: 39.2 CM/S
VAS RIGHT CCA PROX EDV: 7.9 CM/S
VAS RIGHT CCA PROX PSV: 90.3 CM/S
VAS RIGHT ECA EDV: 3.1 CM/S
VAS RIGHT ECA PSV: 84.4 CM/S
VAS RIGHT ICA DIST EDV: 14.3 CM/S
VAS RIGHT ICA DIST PSV: 80.7 CM/S
VAS RIGHT ICA MID EDV: 16.6 CM/S
VAS RIGHT ICA MID PSV: 63.8 CM/S
VAS RIGHT ICA PROX EDV: 5.9 CM/S
VAS RIGHT ICA PROX PSV: 57.8 CM/S
VAS RIGHT VERTEBRAL EDV: 7.8 CM/S
VAS RIGHT VERTEBRAL PSV: 46.1 CM/S

## 2025-06-05 ENCOUNTER — HOSPITAL ENCOUNTER (EMERGENCY)
Facility: HOSPITAL | Age: 75
Discharge: HOME | End: 2025-06-05
Attending: EMERGENCY MEDICINE
Payer: MEDICARE

## 2025-06-05 ENCOUNTER — APPOINTMENT (OUTPATIENT)
Facility: HOSPITAL | Age: 75
End: 2025-06-05
Payer: MEDICARE

## 2025-06-05 VITALS
DIASTOLIC BLOOD PRESSURE: 77 MMHG | HEART RATE: 97 BPM | WEIGHT: 175 LBS | TEMPERATURE: 98.3 F | BODY MASS INDEX: 28.12 KG/M2 | SYSTOLIC BLOOD PRESSURE: 108 MMHG | RESPIRATION RATE: 18 BRPM | HEIGHT: 66 IN | OXYGEN SATURATION: 98 %

## 2025-06-05 DIAGNOSIS — M54.41 CHRONIC RIGHT-SIDED LOW BACK PAIN WITH RIGHT-SIDED SCIATICA: ICD-10-CM

## 2025-06-05 DIAGNOSIS — Z91.81 HISTORY OF RECENT FALL: Primary | ICD-10-CM

## 2025-06-05 DIAGNOSIS — G89.29 CHRONIC RIGHT-SIDED LOW BACK PAIN WITH RIGHT-SIDED SCIATICA: ICD-10-CM

## 2025-06-05 PROCEDURE — 72100 X-RAY EXAM L-S SPINE 2/3 VWS: CPT

## 2025-06-05 PROCEDURE — 99283 EMERGENCY DEPT VISIT LOW MDM: CPT

## 2025-06-05 RX ORDER — OXYCODONE HYDROCHLORIDE 10 MG/1
10 TABLET ORAL
COMMUNITY
Start: 2025-05-14

## 2025-06-05 RX ORDER — HYDROMORPHONE HYDROCHLORIDE 4 MG/1
4 TABLET ORAL EVERY 6 HOURS PRN
Qty: 12 TABLET | Refills: 0 | Status: SHIPPED | OUTPATIENT
Start: 2025-06-05 | End: 2025-06-08

## 2025-06-05 RX ORDER — HYDROMORPHONE HYDROCHLORIDE 4 MG/1
4 TABLET ORAL 3 TIMES DAILY PRN
COMMUNITY
Start: 2025-04-15 | End: 2025-06-05

## 2025-06-05 RX ORDER — BACLOFEN 10 MG/1
10 TABLET ORAL 3 TIMES DAILY
COMMUNITY

## 2025-06-05 RX ORDER — GABAPENTIN 300 MG/1
300 CAPSULE ORAL 3 TIMES DAILY
COMMUNITY

## 2025-06-05 RX ORDER — CYCLOBENZAPRINE HCL 5 MG
5 TABLET ORAL
COMMUNITY
Start: 2025-05-30

## 2025-06-05 RX ORDER — OXYCODONE AND ACETAMINOPHEN 10; 325 MG/1; MG/1
1 TABLET ORAL EVERY 4 HOURS PRN
COMMUNITY

## 2025-06-05 RX ORDER — METOPROLOL TARTRATE 50 MG
50 TABLET ORAL
COMMUNITY
Start: 2025-03-17

## 2025-06-05 RX ORDER — MORPHINE SULFATE 15 MG/1
15 TABLET, FILM COATED, EXTENDED RELEASE ORAL 2 TIMES DAILY
COMMUNITY

## 2025-06-05 ASSESSMENT — PAIN - FUNCTIONAL ASSESSMENT: PAIN_FUNCTIONAL_ASSESSMENT: 0-10

## 2025-06-05 ASSESSMENT — LIFESTYLE VARIABLES
HOW MANY STANDARD DRINKS CONTAINING ALCOHOL DO YOU HAVE ON A TYPICAL DAY: PATIENT DOES NOT DRINK
HOW OFTEN DO YOU HAVE A DRINK CONTAINING ALCOHOL: NEVER

## 2025-06-05 ASSESSMENT — PAIN SCALES - GENERAL: PAINLEVEL_OUTOF10: 9

## 2025-06-05 NOTE — ED TRIAGE NOTES
Pt arrives POV with c/o back pain reports spine dr has her oxy and recently took her off it after being on it for 20 years. Was instructed to come to ED for pain control.
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
## 2025-08-18 ENCOUNTER — HOSPITAL ENCOUNTER (OUTPATIENT)
Facility: HOSPITAL | Age: 75
Setting detail: SPECIMEN
Discharge: HOME | End: 2025-08-21

## 2025-08-18 LAB
ALBUMIN SERPL-MCNC: 3.7 G/DL (ref 3.5–5)
ALBUMIN/GLOB SERPL: 0.9 (ref 1.1–2.2)
ALP SERPL-CCNC: 97 U/L (ref 45–117)
ALT SERPL-CCNC: 30 U/L (ref 12–78)
ANION GAP SERPL CALC-SCNC: 7 MMOL/L (ref 2–12)
AST SERPL-CCNC: 19 U/L (ref 15–37)
BASOPHILS # BLD: 0.06 K/UL (ref 0–0.1)
BASOPHILS NFR BLD: 1.1 % (ref 0–1)
BILIRUB SERPL-MCNC: 0.5 MG/DL (ref 0.2–1)
BUN SERPL-MCNC: 12 MG/DL (ref 6–20)
BUN/CREAT SERPL: 13 (ref 12–20)
CALCIUM SERPL-MCNC: 9.8 MG/DL (ref 8.5–10.1)
CHLORIDE SERPL-SCNC: 100 MMOL/L (ref 97–108)
CHOLEST SERPL-MCNC: 233 MG/DL
CO2 SERPL-SCNC: 31 MMOL/L (ref 21–32)
CREAT SERPL-MCNC: 0.89 MG/DL (ref 0.55–1.02)
DIFFERENTIAL METHOD BLD: ABNORMAL
EOSINOPHIL # BLD: 0.16 K/UL (ref 0–0.4)
EOSINOPHIL NFR BLD: 3.1 % (ref 0–0.7)
ERYTHROCYTE [DISTWIDTH] IN BLOOD BY AUTOMATED COUNT: 14.1 % (ref 11.5–14.5)
EST. AVERAGE GLUCOSE BLD GHB EST-MCNC: 117 MG/DL
GLOBULIN SER CALC-MCNC: 4.1 G/DL (ref 2–4)
GLUCOSE SERPL-MCNC: 111 MG/DL (ref 65–100)
HBA1C MFR BLD: 5.7 % (ref 4–5.6)
HCT VFR BLD AUTO: 45.9 % (ref 35–47)
HDLC SERPL-MCNC: 111 MG/DL
HDLC SERPL: 2.1 (ref 0–5)
HGB BLD-MCNC: 14.8 G/DL (ref 11.5–16)
IMM GRANULOCYTES # BLD AUTO: 0.02 K/UL (ref 0–0.04)
IMM GRANULOCYTES NFR BLD AUTO: 0.4 % (ref 0–0.5)
LDLC SERPL CALC-MCNC: 107 MG/DL (ref 0–100)
LYMPHOCYTES # BLD: 1.48 K/UL (ref 0.8–3.5)
LYMPHOCYTES NFR BLD: 28.3 % (ref 12–49)
MCH RBC QN AUTO: 30.2 PG (ref 26–34)
MCHC RBC AUTO-ENTMCNC: 32.2 G/DL (ref 30–36.5)
MCV RBC AUTO: 93.7 FL (ref 80–99)
MONOCYTES # BLD: 0.36 K/UL (ref 0–1)
MONOCYTES NFR BLD: 6.9 % (ref 5–13)
NEUTS SEG # BLD: 3.15 K/UL (ref 1.8–8)
NEUTS SEG NFR BLD: 60.2 % (ref 32–75)
NRBC # BLD: 0 K/UL (ref 0–0.01)
NRBC BLD-RTO: 0 PER 100 WBC
PLATELET # BLD AUTO: 240 K/UL (ref 150–400)
PMV BLD AUTO: 9.6 FL (ref 8.9–12.9)
POTASSIUM SERPL-SCNC: 4.1 MMOL/L (ref 3.5–5.1)
PROT SERPL-MCNC: 7.8 G/DL (ref 6.4–8.2)
RBC # BLD AUTO: 4.9 M/UL (ref 3.8–5.2)
SODIUM SERPL-SCNC: 138 MMOL/L (ref 136–145)
TRIGL SERPL-MCNC: 75 MG/DL
VLDLC SERPL CALC-MCNC: 15 MG/DL
WBC # BLD AUTO: 5.2 K/UL (ref 3.6–11)

## 2025-08-18 PROCEDURE — 83036 HEMOGLOBIN GLYCOSYLATED A1C: CPT

## 2025-08-18 PROCEDURE — 80053 COMPREHEN METABOLIC PANEL: CPT

## 2025-08-18 PROCEDURE — 82306 VITAMIN D 25 HYDROXY: CPT

## 2025-08-18 PROCEDURE — 85025 COMPLETE CBC W/AUTO DIFF WBC: CPT

## 2025-08-18 PROCEDURE — 80061 LIPID PANEL: CPT

## 2025-08-19 LAB — 25(OH)D3 SERPL-MCNC: 38.3 NG/ML (ref 30–100)
